# Patient Record
Sex: FEMALE | Race: WHITE | NOT HISPANIC OR LATINO | Employment: FULL TIME | URBAN - METROPOLITAN AREA
[De-identification: names, ages, dates, MRNs, and addresses within clinical notes are randomized per-mention and may not be internally consistent; named-entity substitution may affect disease eponyms.]

---

## 2017-09-21 ENCOUNTER — ALLSCRIPTS OFFICE VISIT (OUTPATIENT)
Dept: OTHER | Facility: OTHER | Age: 57
End: 2017-09-21

## 2017-09-21 DIAGNOSIS — E78.5 HYPERLIPIDEMIA: ICD-10-CM

## 2017-09-21 DIAGNOSIS — K21.9 GASTRO-ESOPHAGEAL REFLUX DISEASE WITHOUT ESOPHAGITIS: ICD-10-CM

## 2017-09-26 ENCOUNTER — APPOINTMENT (OUTPATIENT)
Dept: LAB | Facility: HOSPITAL | Age: 57
End: 2017-09-26
Payer: COMMERCIAL

## 2017-09-26 ENCOUNTER — TRANSCRIBE ORDERS (OUTPATIENT)
Dept: ADMINISTRATIVE | Facility: HOSPITAL | Age: 57
End: 2017-09-26

## 2017-09-26 ENCOUNTER — GENERIC CONVERSION - ENCOUNTER (OUTPATIENT)
Dept: OTHER | Facility: OTHER | Age: 57
End: 2017-09-26

## 2017-09-26 DIAGNOSIS — E78.5 HYPERLIPIDEMIA: ICD-10-CM

## 2017-09-26 DIAGNOSIS — K21.9 GASTRO-ESOPHAGEAL REFLUX DISEASE WITHOUT ESOPHAGITIS: ICD-10-CM

## 2017-09-26 LAB
ALBUMIN SERPL BCP-MCNC: 3.5 G/DL (ref 3.5–5)
ALP SERPL-CCNC: 44 U/L (ref 46–116)
ALT SERPL W P-5'-P-CCNC: 19 U/L (ref 12–78)
ANION GAP SERPL CALCULATED.3IONS-SCNC: 8 MMOL/L (ref 4–13)
AST SERPL W P-5'-P-CCNC: 14 U/L (ref 5–45)
BASOPHILS # BLD AUTO: 0.03 THOUSANDS/ΜL (ref 0–0.1)
BASOPHILS NFR BLD AUTO: 1 % (ref 0–1)
BILIRUB SERPL-MCNC: 0.3 MG/DL (ref 0.2–1)
BUN SERPL-MCNC: 15 MG/DL (ref 5–25)
CALCIUM SERPL-MCNC: 9.4 MG/DL (ref 8.3–10.1)
CHLORIDE SERPL-SCNC: 105 MMOL/L (ref 100–108)
CHOLEST SERPL-MCNC: 215 MG/DL (ref 50–200)
CO2 SERPL-SCNC: 30 MMOL/L (ref 21–32)
CREAT SERPL-MCNC: 0.59 MG/DL (ref 0.6–1.3)
EOSINOPHIL # BLD AUTO: 0.1 THOUSAND/ΜL (ref 0–0.61)
EOSINOPHIL NFR BLD AUTO: 2 % (ref 0–6)
ERYTHROCYTE [DISTWIDTH] IN BLOOD BY AUTOMATED COUNT: 12.3 % (ref 11.6–15.1)
GFR SERPL CREATININE-BSD FRML MDRD: 101 ML/MIN/1.73SQ M
GLUCOSE P FAST SERPL-MCNC: 96 MG/DL (ref 65–99)
HCT VFR BLD AUTO: 38.8 % (ref 34.8–46.1)
HDLC SERPL-MCNC: 55 MG/DL (ref 40–60)
HGB BLD-MCNC: 13.2 G/DL (ref 11.5–15.4)
LDLC SERPL CALC-MCNC: 153 MG/DL (ref 0–100)
LYMPHOCYTES # BLD AUTO: 2.42 THOUSANDS/ΜL (ref 0.6–4.47)
LYMPHOCYTES NFR BLD AUTO: 46 % (ref 14–44)
MCH RBC QN AUTO: 32.5 PG (ref 26.8–34.3)
MCHC RBC AUTO-ENTMCNC: 34 G/DL (ref 31.4–37.4)
MCV RBC AUTO: 96 FL (ref 82–98)
MONOCYTES # BLD AUTO: 0.38 THOUSAND/ΜL (ref 0.17–1.22)
MONOCYTES NFR BLD AUTO: 7 % (ref 4–12)
NEUTROPHILS # BLD AUTO: 2.33 THOUSANDS/ΜL (ref 1.85–7.62)
NEUTS SEG NFR BLD AUTO: 44 % (ref 43–75)
PLATELET # BLD AUTO: 286 THOUSANDS/UL (ref 149–390)
PMV BLD AUTO: 9.8 FL (ref 8.9–12.7)
POTASSIUM SERPL-SCNC: 4.8 MMOL/L (ref 3.5–5.3)
PROT SERPL-MCNC: 6.7 G/DL (ref 6.4–8.2)
RBC # BLD AUTO: 4.06 MILLION/UL (ref 3.81–5.12)
SODIUM SERPL-SCNC: 143 MMOL/L (ref 136–145)
TRIGL SERPL-MCNC: 36 MG/DL
TSH SERPL DL<=0.05 MIU/L-ACNC: 5.34 UIU/ML (ref 0.36–3.74)
WBC # BLD AUTO: 5.26 THOUSAND/UL (ref 4.31–10.16)

## 2017-09-26 PROCEDURE — 85025 COMPLETE CBC W/AUTO DIFF WBC: CPT

## 2017-09-26 PROCEDURE — 80061 LIPID PANEL: CPT

## 2017-09-26 PROCEDURE — 84443 ASSAY THYROID STIM HORMONE: CPT

## 2017-09-26 PROCEDURE — 36415 COLL VENOUS BLD VENIPUNCTURE: CPT

## 2017-09-26 PROCEDURE — 80053 COMPREHEN METABOLIC PANEL: CPT

## 2018-01-10 NOTE — RESULT NOTES
Verified Results  (1) CBC/PLT/DIFF [id number] 07:27AM Keira Felix Order Number: [id number]     Test Name Result Flag Reference   WBC COUNT 5 26 Thousand/uL  4 31-10 16   RBC COUNT 4 06 Million/uL  3 81-5 12   HEMOGLOBIN 13 2 g/dL  11 5-15 4   HEMATOCRIT 38 8 %  34 8-46  1   MCV 96 fL  82-98   MCH 32 5 pg  26 8-34 3   MCHC 34 0 g/dL  31 4-37 4   RDW 12 3 %  11 6-15 1   MPV 9 8 fL  8 9-12 7   PLATELET COUNT 407 Thousands/uL  149-390   NEUTROPHILS RELATIVE PERCENT 44 %  43-75   LYMPHOCYTES RELATIVE PERCENT 46 % H 14-44   MONOCYTES RELATIVE PERCENT 7 %  4-12   EOSINOPHILS RELATIVE PERCENT 2 %  0-6   BASOPHILS RELATIVE PERCENT 1 %  0-1   NEUTROPHILS ABSOLUTE COUNT 2 33 Thousands/? ??L  1 85-7 62   LYMPHOCYTES ABSOLUTE COUNT 2 42 Thousands/? ??L  0 60-4 47   MONOCYTES ABSOLUTE COUNT 0 38 Thousand/? ??L  0 17-1 22   EOSINOPHILS ABSOLUTE COUNT 0 10 Thousand/? ??L  0 00-0 61   BASOPHILS ABSOLUTE COUNT 0 03 Thousands/? ??L  0 00-0 10     (1) COMPREHENSIVE METABOLIC PANEL [id number] 10:72VB Keira Felix Order Number: [id number]     Test Name Result Flag Reference   SODIUM 143 mmol/L  136-145   POTASSIUM 4 8 mmol/L  3 5-5 3   CHLORIDE 105 mmol/L  100-108   CARBON DIOXIDE 30 mmol/L  21-32   ANION GAP (CALC) 8 mmol/L  4-13   BLOOD UREA NITROGEN 15 mg/dL  5-25   CREATININE 0 59 mg/dL L 0 60-1 30   Standardized to IDMS reference method   CALCIUM 9 4 mg/dL  8 3-10 1   BILI, TOTAL 0 30 mg/dL  0 20-1 00   ALK PHOSPHATAS 44 U/L L    ALT (SGPT) 19 U/L  12-78   Specimen collection should occur prior to Sulfasalazine administration due to the potential for falsely depressed results  AST(SGOT) 14 U/L  5-45   Specimen collection should occur prior to Sulfasalazine administration due to the potential for falsely depressed results     ALBUMIN 3 5 g/dL  3 5-5 0   TOTAL PROTEIN 6 7 g/dL  6 4-8 2   eGFR 101 ml/min/1 73sq m     [employer] Disease Education Program recommendations are as follows:  GFR calculation is accurate only with a steady state creatinine  Chronic Kidney disease less than 60 ml/min/1 73 sq  meters  Kidney failure less than 15 ml/min/1 73 sq  meters  GLUCOSE FASTING 96 mg/dL  65-99   Specimen collection should occur prior to Sulfasalazine administration due to the potential for falsely depressed results  Specimen collection should occur prior to Sulfapyridine administration due to the potential for falsely elevated results  (1) LIPID PANEL, FASTING [id number] 07:27AM [employer] Order Number: [id number]     Test Name Result Flag Reference   CHOLESTEROL 215 mg/dL H    HDL,DIRECT 55 mg/dL  40-60   Specimen collection should occur prior to Metamizole administration due to the potential for falsley depressed results  LDL CHOLESTEROL CALCULATED 153 mg/dL H 0-100   Triglyceride:        Normal <150 mg/dl   Borderline High 150-199 mg/dl   High 200-499 mg/dl   Very High >499 mg/dl      Cholesterol:       Desirable <200 mg/dl    Borderline High 200-239 mg/dl    High >239 mg/dl      HDL Cholesterol:       High>59 mg/dL    Low <41 mg/dL      This screening LDL is a calculated result  It does not have the accuracy of the Direct Measured LDL in the monitoring of patients with hyperlipidemia and/or statin therapy  Direct Measure LDL (MXU528) must be ordered separately in these patients  TRIGLYCERIDES 36 mg/dL  <=150   Specimen collection should occur prior to N-Acetylcysteine or Metamizole administration due to the potential for falsely depressed results  (1) TSH [id number] 07:27AM [employer] [facility] [employer] Order Number: [id number]     Test Name Result Flag Reference   TSH 5 336 uIU/mL H 0 358-3 740   Patients undergoing fluorescein dye angiography may retain small amounts of fluorescein in the body for 48-72 hours post procedure  Samples containing fluorescein can produce falsely depressed TSH values   If the patient had this procedure,a specimen should be resubmitted post fluorescein clearance            The recommended reference ranges for TSH during pregnancy are as follows:  First trimester 0 1 to 2 5 uIU/mL  Second trimester  0 2 to 3 0 uIU/mL  Third trimester 0 3 to 3 0 uIU/m

## 2018-01-13 VITALS
WEIGHT: 133 LBS | BODY MASS INDEX: 20.16 KG/M2 | HEIGHT: 68 IN | SYSTOLIC BLOOD PRESSURE: 110 MMHG | DIASTOLIC BLOOD PRESSURE: 70 MMHG

## 2018-03-27 ENCOUNTER — OFFICE VISIT (OUTPATIENT)
Dept: FAMILY MEDICINE CLINIC | Facility: CLINIC | Age: 58
End: 2018-03-27
Payer: COMMERCIAL

## 2018-03-27 VITALS
BODY MASS INDEX: 21.19 KG/M2 | SYSTOLIC BLOOD PRESSURE: 124 MMHG | HEIGHT: 68 IN | WEIGHT: 139.8 LBS | DIASTOLIC BLOOD PRESSURE: 76 MMHG

## 2018-03-27 DIAGNOSIS — R73.9 HYPERGLYCEMIA: ICD-10-CM

## 2018-03-27 DIAGNOSIS — Z23 NEED FOR PERTUSSIS VACCINATION: ICD-10-CM

## 2018-03-27 DIAGNOSIS — E78.49 OTHER HYPERLIPIDEMIA: ICD-10-CM

## 2018-03-27 DIAGNOSIS — E03.8 SUBCLINICAL HYPOTHYROIDISM: ICD-10-CM

## 2018-03-27 DIAGNOSIS — Z12.31 ENCOUNTER FOR SCREENING MAMMOGRAM FOR BREAST CANCER: ICD-10-CM

## 2018-03-27 DIAGNOSIS — M67.442 GANGLION CYST OF FINGER OF LEFT HAND: Primary | ICD-10-CM

## 2018-03-27 DIAGNOSIS — K21.9 GASTROESOPHAGEAL REFLUX DISEASE WITHOUT ESOPHAGITIS: ICD-10-CM

## 2018-03-27 PROCEDURE — 90715 TDAP VACCINE 7 YRS/> IM: CPT | Performed by: FAMILY MEDICINE

## 2018-03-27 PROCEDURE — 4004F PT TOBACCO SCREEN RCVD TLK: CPT | Performed by: FAMILY MEDICINE

## 2018-03-27 PROCEDURE — 99214 OFFICE O/P EST MOD 30 MIN: CPT | Performed by: FAMILY MEDICINE

## 2018-03-27 PROCEDURE — 90471 IMMUNIZATION ADMIN: CPT | Performed by: FAMILY MEDICINE

## 2018-03-27 PROCEDURE — 3008F BODY MASS INDEX DOCD: CPT | Performed by: FAMILY MEDICINE

## 2018-03-27 RX ORDER — SIMVASTATIN 40 MG
1 TABLET ORAL DAILY
COMMUNITY
Start: 2014-12-01 | End: 2018-09-22 | Stop reason: SDUPTHER

## 2018-03-27 RX ORDER — PANTOPRAZOLE SODIUM 40 MG/1
1 TABLET, DELAYED RELEASE ORAL DAILY
COMMUNITY
Start: 2014-07-03 | End: 2018-10-06 | Stop reason: SDUPTHER

## 2018-03-27 RX ORDER — MULTIVITAMIN/IRON/FOLIC ACID 18MG-0.4MG
TABLET ORAL
COMMUNITY

## 2018-03-27 NOTE — PROGRESS NOTES
Assessment/Plan:   we will refer her to Orthopedics for her ganglion cyst   Blood work ordered  Mammogram ordered  Adacel given today  Follow-up in 4-6 months or p r n  No problem-specific Assessment & Plan notes found for this encounter  Diagnoses and all orders for this visit:    Ganglion cyst of finger of left hand  -     Ambulatory referral to Orthopedic Surgery; Future    Encounter for screening mammogram for breast cancer  -     Mammo screening bilateral w 3d & cad; Future    Need for pertussis vaccination  -     TDAP VACCINE GREATER THAN OR EQUAL TO 8YO IM    Gastroesophageal reflux disease without esophagitis  -     CBC and differential    Hyperglycemia  -     HEMOGLOBIN A1C W/ EAG ESTIMATION    Other hyperlipidemia  -     Comprehensive metabolic panel  -     Lipid Panel with Direct LDL reflex    Subclinical hypothyroidism  -     TSH, 3rd generation with T4 reflex  -     T4, free  -     T3    Other orders  -     DAILY MULTIPLE VITAMINS/IRON TABS; Take by mouth  -     pantoprazole (PROTONIX) 40 mg tablet; Take 1 tablet by mouth daily  -     simvastatin (ZOCOR) 40 mg tablet; Take 1 tablet by mouth Daily          Subjective:      Patient ID: Geovani Green is a 60 y o  female  F/U  Patient denies any chest pain or shortness of breath  Patient has a ganglion cyst on her left finger  This has been recurrent for her  It is hard for her to bend her finger and has become tender  Patient tried to stop her Protonix, but her reflux return  She is back on it, and doing well  Patient does  still feel fatigued  Her last TSH was 5  Thyroid Problem   Presents for follow-up visit  Symptoms include fatigue  The symptoms have been stable  Heartburn   She complains of heartburn  This is a chronic problem  The problem occurs rarely  The problem has been resolved  Associated symptoms include fatigue  Risk factors include smoking/tobacco exposure  She has tried a PPI for the symptoms   The treatment provided significant relief  The following portions of the patient's history were reviewed and updated as appropriate:   She  has no past medical history on file  She   Patient Active Problem List    Diagnosis Date Noted    Subclinical hypothyroidism 03/27/2018    Vitreous floaters of both eyes 07/03/2014    Gastroesophageal reflux disease without esophagitis 10/29/2012    Hyperglycemia 10/29/2012    Other hyperlipidemia 10/29/2012    Sciatica 10/29/2012     She  has a past surgical history that includes Bladder surgery; 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); Esophagogastroduodenoscopy (2013); and Hysterectomy  Her family history is not on file  She  reports that she has been smoking  She has never used smokeless tobacco  She reports that she drinks alcohol  She reports that she does not use drugs  Current Outpatient Prescriptions   Medication Sig Dispense Refill    DAILY MULTIPLE VITAMINS/IRON TABS Take by mouth      pantoprazole (PROTONIX) 40 mg tablet Take 1 tablet by mouth daily      simvastatin (ZOCOR) 40 mg tablet Take 1 tablet by mouth Daily       No current facility-administered medications for this visit  No current outpatient prescriptions on file prior to visit  No current facility-administered medications on file prior to visit  She has No Known Allergies       Review of Systems   Constitutional: Positive for fatigue  Respiratory: Negative  Cardiovascular: Negative  Gastrointestinal: Positive for heartburn  As per HPI   Genitourinary: Negative  Musculoskeletal:        As per HPI         Objective:      /76   Ht 5' 8" (1 727 m)   Wt 63 4 kg (139 lb 12 8 oz)   BMI 21 26 kg/m²          Physical Exam   Constitutional: She is oriented to person, place, and time  She appears well-developed and well-nourished  No distress  Cardiovascular: Normal rate, regular rhythm and normal heart sounds      No murmur heard   Pulmonary/Chest: Effort normal and breath sounds normal  No respiratory distress  She has no wheezes  She has no rales  Musculoskeletal: She exhibits no edema  Positive ganglion cyst, 1 cm in size on the left ring finger  Neurological: She is alert and oriented to person, place, and time  Psychiatric: She has a normal mood and affect   Her behavior is normal

## 2018-06-05 ENCOUNTER — APPOINTMENT (OUTPATIENT)
Dept: LAB | Facility: HOSPITAL | Age: 58
End: 2018-06-05
Payer: COMMERCIAL

## 2018-06-05 ENCOUNTER — HOSPITAL ENCOUNTER (OUTPATIENT)
Dept: MAMMOGRAPHY | Facility: HOSPITAL | Age: 58
Discharge: HOME | End: 2018-06-05
Payer: COMMERCIAL

## 2018-06-05 DIAGNOSIS — Z12.31 ENCOUNTER FOR SCREENING MAMMOGRAM FOR BREAST CANCER: ICD-10-CM

## 2018-06-05 LAB
ALBUMIN SERPL BCP-MCNC: 4 G/DL (ref 3.5–5)
ALP SERPL-CCNC: 43 U/L (ref 46–116)
ALT SERPL W P-5'-P-CCNC: 29 U/L (ref 12–78)
ANION GAP SERPL CALCULATED.3IONS-SCNC: 9 MMOL/L (ref 4–13)
AST SERPL W P-5'-P-CCNC: 19 U/L (ref 5–45)
BASOPHILS # BLD AUTO: 0.04 THOUSANDS/ΜL (ref 0–0.1)
BASOPHILS NFR BLD AUTO: 1 % (ref 0–1)
BILIRUB SERPL-MCNC: 0.4 MG/DL (ref 0.2–1)
BUN SERPL-MCNC: 14 MG/DL (ref 5–25)
CALCIUM SERPL-MCNC: 9.1 MG/DL (ref 8.3–10.1)
CHLORIDE SERPL-SCNC: 104 MMOL/L (ref 100–108)
CHOLEST SERPL-MCNC: 199 MG/DL (ref 50–200)
CO2 SERPL-SCNC: 29 MMOL/L (ref 21–32)
CREAT SERPL-MCNC: 0.64 MG/DL (ref 0.6–1.3)
EOSINOPHIL # BLD AUTO: 0.15 THOUSAND/ΜL (ref 0–0.61)
EOSINOPHIL NFR BLD AUTO: 3 % (ref 0–6)
ERYTHROCYTE [DISTWIDTH] IN BLOOD BY AUTOMATED COUNT: 12 % (ref 11.6–15.1)
EST. AVERAGE GLUCOSE BLD GHB EST-MCNC: 128 MG/DL
GFR SERPL CREATININE-BSD FRML MDRD: 98 ML/MIN/1.73SQ M
GLUCOSE P FAST SERPL-MCNC: 97 MG/DL (ref 65–99)
HBA1C MFR BLD: 6.1 % (ref 4.2–6.3)
HCT VFR BLD AUTO: 40.2 % (ref 34.8–46.1)
HDLC SERPL-MCNC: 65 MG/DL (ref 40–60)
HGB BLD-MCNC: 14 G/DL (ref 11.5–15.4)
LDLC SERPL CALC-MCNC: 126 MG/DL (ref 0–100)
LYMPHOCYTES # BLD AUTO: 2.89 THOUSANDS/ΜL (ref 0.6–4.47)
LYMPHOCYTES NFR BLD AUTO: 51 % (ref 14–44)
MCH RBC QN AUTO: 33 PG (ref 26.8–34.3)
MCHC RBC AUTO-ENTMCNC: 34.8 G/DL (ref 31.4–37.4)
MCV RBC AUTO: 95 FL (ref 82–98)
MONOCYTES # BLD AUTO: 0.43 THOUSAND/ΜL (ref 0.17–1.22)
MONOCYTES NFR BLD AUTO: 8 % (ref 4–12)
NEUTROPHILS # BLD AUTO: 2.2 THOUSANDS/ΜL (ref 1.85–7.62)
NEUTS SEG NFR BLD AUTO: 39 % (ref 43–75)
PLATELET # BLD AUTO: 266 THOUSANDS/UL (ref 149–390)
PMV BLD AUTO: 9.9 FL (ref 8.9–12.7)
POTASSIUM SERPL-SCNC: 4.7 MMOL/L (ref 3.5–5.3)
PROT SERPL-MCNC: 7.2 G/DL (ref 6.4–8.2)
RBC # BLD AUTO: 4.24 MILLION/UL (ref 3.81–5.12)
SODIUM SERPL-SCNC: 142 MMOL/L (ref 136–145)
T3 SERPL-MCNC: 0.9 NG/ML (ref 0.6–1.8)
T4 FREE SERPL-MCNC: 1 NG/DL (ref 0.76–1.46)
T4 FREE SERPL-MCNC: 1 NG/DL (ref 0.76–1.46)
TRIGL SERPL-MCNC: 39 MG/DL
TSH SERPL DL<=0.05 MIU/L-ACNC: 5.01 UIU/ML (ref 0.36–3.74)
WBC # BLD AUTO: 5.71 THOUSAND/UL (ref 4.31–10.16)

## 2018-06-05 PROCEDURE — 85025 COMPLETE CBC W/AUTO DIFF WBC: CPT | Performed by: FAMILY MEDICINE

## 2018-06-05 PROCEDURE — 84443 ASSAY THYROID STIM HORMONE: CPT | Performed by: FAMILY MEDICINE

## 2018-06-05 PROCEDURE — 77063 BREAST TOMOSYNTHESIS BI: CPT

## 2018-06-05 PROCEDURE — 36415 COLL VENOUS BLD VENIPUNCTURE: CPT | Performed by: FAMILY MEDICINE

## 2018-06-05 PROCEDURE — 80061 LIPID PANEL: CPT | Performed by: FAMILY MEDICINE

## 2018-06-05 PROCEDURE — 84439 ASSAY OF FREE THYROXINE: CPT | Performed by: FAMILY MEDICINE

## 2018-06-05 PROCEDURE — 83036 HEMOGLOBIN GLYCOSYLATED A1C: CPT | Performed by: FAMILY MEDICINE

## 2018-06-05 PROCEDURE — 77067 SCR MAMMO BI INCL CAD: CPT

## 2018-06-05 PROCEDURE — 80053 COMPREHEN METABOLIC PANEL: CPT | Performed by: FAMILY MEDICINE

## 2018-06-05 PROCEDURE — 84480 ASSAY TRIIODOTHYRONINE (T3): CPT | Performed by: FAMILY MEDICINE

## 2018-09-22 DIAGNOSIS — E78.49 OTHER HYPERLIPIDEMIA: Primary | ICD-10-CM

## 2018-09-24 RX ORDER — SIMVASTATIN 40 MG
TABLET ORAL
Qty: 90 TABLET | Refills: 3 | Status: SHIPPED | OUTPATIENT
Start: 2018-09-24 | End: 2019-01-22 | Stop reason: SDUPTHER

## 2018-10-06 DIAGNOSIS — K21.9 GASTROESOPHAGEAL REFLUX DISEASE WITHOUT ESOPHAGITIS: Primary | ICD-10-CM

## 2018-10-07 RX ORDER — PANTOPRAZOLE SODIUM 40 MG/1
TABLET, DELAYED RELEASE ORAL
Qty: 90 TABLET | Refills: 3 | Status: SHIPPED | OUTPATIENT
Start: 2018-10-07 | End: 2019-01-22 | Stop reason: SDUPTHER

## 2019-01-22 ENCOUNTER — APPOINTMENT (OUTPATIENT)
Dept: RADIOLOGY | Facility: MEDICAL CENTER | Age: 59
End: 2019-01-22
Payer: COMMERCIAL

## 2019-01-22 ENCOUNTER — OFFICE VISIT (OUTPATIENT)
Dept: FAMILY MEDICINE CLINIC | Facility: CLINIC | Age: 59
End: 2019-01-22
Payer: COMMERCIAL

## 2019-01-22 ENCOUNTER — APPOINTMENT (OUTPATIENT)
Dept: LAB | Facility: MEDICAL CENTER | Age: 59
End: 2019-01-22
Payer: COMMERCIAL

## 2019-01-22 VITALS
SYSTOLIC BLOOD PRESSURE: 124 MMHG | WEIGHT: 144 LBS | BODY MASS INDEX: 21.82 KG/M2 | HEIGHT: 68 IN | DIASTOLIC BLOOD PRESSURE: 78 MMHG

## 2019-01-22 DIAGNOSIS — G89.29 CHRONIC BILATERAL LOW BACK PAIN WITHOUT SCIATICA: Primary | ICD-10-CM

## 2019-01-22 DIAGNOSIS — E78.49 OTHER HYPERLIPIDEMIA: ICD-10-CM

## 2019-01-22 DIAGNOSIS — N95.1 POSTMENOPAUSAL SYNDROME: ICD-10-CM

## 2019-01-22 DIAGNOSIS — M54.50 CHRONIC BILATERAL LOW BACK PAIN WITHOUT SCIATICA: Primary | ICD-10-CM

## 2019-01-22 DIAGNOSIS — M54.50 CHRONIC BILATERAL LOW BACK PAIN WITHOUT SCIATICA: ICD-10-CM

## 2019-01-22 DIAGNOSIS — E03.8 SUBCLINICAL HYPOTHYROIDISM: ICD-10-CM

## 2019-01-22 DIAGNOSIS — K21.9 GASTROESOPHAGEAL REFLUX DISEASE WITHOUT ESOPHAGITIS: ICD-10-CM

## 2019-01-22 DIAGNOSIS — R73.9 HYPERGLYCEMIA: ICD-10-CM

## 2019-01-22 DIAGNOSIS — G89.29 CHRONIC BILATERAL LOW BACK PAIN WITHOUT SCIATICA: ICD-10-CM

## 2019-01-22 LAB
EST. AVERAGE GLUCOSE BLD GHB EST-MCNC: 134 MG/DL
HBA1C MFR BLD: 6.3 % (ref 4.2–6.3)
LDLC SERPL DIRECT ASSAY-MCNC: 110 MG/DL (ref 0–100)
T4 FREE SERPL-MCNC: 1.01 NG/DL (ref 0.76–1.46)
TSH SERPL DL<=0.05 MIU/L-ACNC: 4.89 UIU/ML (ref 0.36–3.74)

## 2019-01-22 PROCEDURE — 4004F PT TOBACCO SCREEN RCVD TLK: CPT | Performed by: FAMILY MEDICINE

## 2019-01-22 PROCEDURE — 83036 HEMOGLOBIN GLYCOSYLATED A1C: CPT | Performed by: FAMILY MEDICINE

## 2019-01-22 PROCEDURE — 72110 X-RAY EXAM L-2 SPINE 4/>VWS: CPT

## 2019-01-22 PROCEDURE — 36415 COLL VENOUS BLD VENIPUNCTURE: CPT | Performed by: FAMILY MEDICINE

## 2019-01-22 PROCEDURE — 83721 ASSAY OF BLOOD LIPOPROTEIN: CPT | Performed by: FAMILY MEDICINE

## 2019-01-22 PROCEDURE — 99214 OFFICE O/P EST MOD 30 MIN: CPT | Performed by: FAMILY MEDICINE

## 2019-01-22 PROCEDURE — 3008F BODY MASS INDEX DOCD: CPT | Performed by: FAMILY MEDICINE

## 2019-01-22 PROCEDURE — 84439 ASSAY OF FREE THYROXINE: CPT | Performed by: FAMILY MEDICINE

## 2019-01-22 PROCEDURE — 84443 ASSAY THYROID STIM HORMONE: CPT | Performed by: FAMILY MEDICINE

## 2019-01-22 PROCEDURE — 3044F HG A1C LEVEL LT 7.0%: CPT | Performed by: FAMILY MEDICINE

## 2019-01-22 RX ORDER — SIMVASTATIN 40 MG
40 TABLET ORAL EVERY EVENING
Qty: 90 TABLET | Refills: 3 | Status: SHIPPED | OUTPATIENT
Start: 2019-01-22 | End: 2020-01-21 | Stop reason: SDUPTHER

## 2019-01-22 RX ORDER — PANTOPRAZOLE SODIUM 40 MG/1
40 TABLET, DELAYED RELEASE ORAL DAILY
Qty: 90 TABLET | Refills: 3 | Status: SHIPPED | OUTPATIENT
Start: 2019-01-22 | End: 2020-01-21 | Stop reason: SDUPTHER

## 2019-01-22 NOTE — PROGRESS NOTES
Assessment/Plan:  Since patient is low back pain has been for such a long time, over 17 years, we will get a low back x-ray  We will refer to Pain Management  We will get a DEXA scan on her  Blood work also ordered  We will see her back in 4-6 months or p r n     We will call her with results of the studies  No problem-specific Assessment & Plan notes found for this encounter  Diagnoses and all orders for this visit:    Chronic bilateral low back pain without sciatica  -     XR spine lumbar minimum 4 views non injury; Future  -     Ambulatory referral to Pain Management; Future    Subclinical hypothyroidism  -     TSH, 3rd generation with Free T4 reflex    Other hyperlipidemia  -     LDL cholesterol, direct  -     simvastatin (ZOCOR) 40 mg tablet; Take 1 tablet (40 mg total) by mouth every evening    Hyperglycemia  -     Hemoglobin A1C    Postmenopausal syndrome  -     DXA bone density spine hip and pelvis; Future    Gastroesophageal reflux disease without esophagitis  -     pantoprazole (PROTONIX) 40 mg tablet; Take 1 tablet (40 mg total) by mouth daily          Subjective:      Patient ID: Danny Schumacher is a 60 y o  female  Patient here today stating that her low back pain is been getting worse  It seems to act up whenever she is crouching down her bending forward  Patient has been dealing with this back pain for or 17 years  It is not radiating down the legs  No bowel or bladder problems  Patient has been seeing chiropractic care with no relief  Patient has GERD is controlled on Protonix  Patient has tried to go off it, but the reflux returns  Back Pain   This is a chronic problem  The current episode started more than 1 year ago  The problem occurs constantly  The problem has been gradually worsening since onset  The pain is present in the lumbar spine  The quality of the pain is described as aching  The pain does not radiate  The pain is moderate  The pain is the same all the time  The symptoms are aggravated by bending, twisting and position  Stiffness is present all day  Pertinent negatives include no bladder incontinence, bowel incontinence, leg pain, paresthesias or weakness  She has tried analgesics for the symptoms  The treatment provided mild relief  Hyperlipidemia   This is a chronic problem  The problem is resistant  Recent lipid tests were reviewed and are variable  There are no known factors aggravating her hyperlipidemia  Pertinent negatives include no leg pain  Current antihyperlipidemic treatment includes statins  The current treatment provides moderate improvement of lipids  There are no compliance problems  Heartburn   She complains of heartburn  This is a chronic problem  The problem occurs occasionally  The heartburn is located in the substernum  The symptoms are aggravated by certain foods  She has tried a PPI for the symptoms  The treatment provided significant relief  The following portions of the patient's history were reviewed and updated as appropriate:   She  has no past medical history on file  She   Patient Active Problem List    Diagnosis Date Noted    Chronic bilateral low back pain without sciatica 01/22/2019    Subclinical hypothyroidism 03/27/2018    Vitreous floaters of both eyes 07/03/2014    Gastroesophageal reflux disease without esophagitis 10/29/2012    Hyperglycemia 10/29/2012    Other hyperlipidemia 10/29/2012    Sciatica 10/29/2012     She  has a past surgical history that includes Bladder surgery; 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); Esophagogastroduodenoscopy (2013); and Hysterectomy  Her family history includes No Known Problems in her mother  She  reports that she has been smoking  She has never used smokeless tobacco  She reports that she drinks alcohol  She reports that she does not use drugs    Current Outpatient Prescriptions   Medication Sig Dispense Refill    DAILY MULTIPLE VITAMINS/IRON TABS Take by mouth      pantoprazole (PROTONIX) 40 mg tablet Take 1 tablet (40 mg total) by mouth daily 90 tablet 3    simvastatin (ZOCOR) 40 mg tablet Take 1 tablet (40 mg total) by mouth every evening 90 tablet 3     No current facility-administered medications for this visit  Current Outpatient Prescriptions on File Prior to Visit   Medication Sig    DAILY MULTIPLE VITAMINS/IRON TABS Take by mouth    [DISCONTINUED] pantoprazole (PROTONIX) 40 mg tablet take 1 tablet by mouth once daily    [DISCONTINUED] simvastatin (ZOCOR) 40 mg tablet take 1 tablet by mouth every evening     No current facility-administered medications on file prior to visit  She has No Known Allergies       Review of Systems   Constitutional: Negative  Respiratory: Negative  Cardiovascular: Negative  Gastrointestinal: Positive for heartburn  Negative for bowel incontinence  As per HPI   Genitourinary: Negative  Negative for bladder incontinence  Musculoskeletal: Positive for back pain  Neurological: Negative for weakness and paresthesias  Objective:      /78   Ht 5' 8" (1 727 m)   Wt 65 3 kg (144 lb)   BMI 21 90 kg/m²          Physical Exam   Constitutional: She is oriented to person, place, and time  She appears well-developed and well-nourished  No distress  HENT:   Head: Normocephalic and atraumatic  Eyes: Conjunctivae are normal    Cardiovascular: Normal rate, regular rhythm and normal heart sounds  Exam reveals no gallop and no friction rub  No murmur heard  Pulmonary/Chest: Effort normal and breath sounds normal  No respiratory distress  She has no wheezes  She has no rales  Musculoskeletal: She exhibits tenderness (Tenderness in the low lumbar spinal musculature and PSIS bilaterally)  She exhibits no edema  Neurological: She is alert and oriented to person, place, and time  Skin: She is not diaphoretic  Psychiatric: She has a normal mood and affect   Her behavior is normal  Judgment and thought content normal    Vitals reviewed

## 2020-01-21 ENCOUNTER — OFFICE VISIT (OUTPATIENT)
Dept: FAMILY MEDICINE CLINIC | Facility: CLINIC | Age: 60
End: 2020-01-21
Payer: COMMERCIAL

## 2020-01-21 VITALS
SYSTOLIC BLOOD PRESSURE: 152 MMHG | WEIGHT: 155.6 LBS | HEIGHT: 68 IN | DIASTOLIC BLOOD PRESSURE: 76 MMHG | BODY MASS INDEX: 23.58 KG/M2

## 2020-01-21 DIAGNOSIS — E78.49 OTHER HYPERLIPIDEMIA: ICD-10-CM

## 2020-01-21 DIAGNOSIS — K21.9 GASTROESOPHAGEAL REFLUX DISEASE WITHOUT ESOPHAGITIS: ICD-10-CM

## 2020-01-21 DIAGNOSIS — R73.9 HYPERGLYCEMIA: ICD-10-CM

## 2020-01-21 DIAGNOSIS — E03.8 SUBCLINICAL HYPOTHYROIDISM: ICD-10-CM

## 2020-01-21 DIAGNOSIS — F41.9 ANXIETY: Primary | ICD-10-CM

## 2020-01-21 DIAGNOSIS — Z12.39 SCREENING FOR BREAST CANCER: ICD-10-CM

## 2020-01-21 PROCEDURE — 3008F BODY MASS INDEX DOCD: CPT | Performed by: FAMILY MEDICINE

## 2020-01-21 PROCEDURE — 99214 OFFICE O/P EST MOD 30 MIN: CPT | Performed by: FAMILY MEDICINE

## 2020-01-21 RX ORDER — SIMVASTATIN 40 MG
40 TABLET ORAL EVERY EVENING
Qty: 90 TABLET | Refills: 3 | Status: SHIPPED | OUTPATIENT
Start: 2020-01-21 | End: 2021-04-06

## 2020-01-21 RX ORDER — PANTOPRAZOLE SODIUM 40 MG/1
40 TABLET, DELAYED RELEASE ORAL DAILY
Qty: 90 TABLET | Refills: 3 | Status: SHIPPED | OUTPATIENT
Start: 2020-01-21 | End: 2021-04-06

## 2020-01-21 NOTE — PROGRESS NOTES
Assessment/Plan: For her anxiety, we will start her on sertraline, 25 mg daily for 4 days, then 50 mg daily  She will call us with any problems  Blood work ordered  We will see her back in 4 weeks or p r n  Problem List Items Addressed This Visit        Digestive    Gastroesophageal reflux disease without esophagitis       Endocrine    Subclinical hypothyroidism    Relevant Orders    CBC and differential    TSH, 3rd generation with Free T4 reflex       Other    Hyperglycemia    Relevant Orders    CBC and differential    Hemoglobin A1C    Other hyperlipidemia    Relevant Orders    CBC and differential    Comprehensive metabolic panel    Lipid Panel with Direct LDL reflex    Anxiety - Primary    Relevant Medications    sertraline (ZOLOFT) 50 mg tablet      Other Visit Diagnoses     Screening for breast cancer        Relevant Orders    Mammo screening bilateral w 3d & cad           Diagnoses and all orders for this visit:    Anxiety  -     sertraline (ZOLOFT) 50 mg tablet; Take 1 tablet (50 mg total) by mouth daily    Screening for breast cancer  -     Mammo screening bilateral w 3d & cad; Future    Other hyperlipidemia  -     CBC and differential  -     Comprehensive metabolic panel  -     Lipid Panel with Direct LDL reflex    Hyperglycemia  -     CBC and differential  -     Hemoglobin A1C    Subclinical hypothyroidism  -     CBC and differential  -     TSH, 3rd generation with Free T4 reflex    Gastroesophageal reflux disease without esophagitis        No problem-specific Assessment & Plan notes found for this encounter  Subjective:      Patient ID: Bess Hernandez is a 63 y o  female  Patient here today stating that she has been having trouble falling asleep and staying asleep  Feels anxious  No SI or HI  Number tried anything for anxiety in the past   Otherwise patient denies any chest pain or shortness of breath  She is due for blood work  Anxiety   Presents for initial visit   Onset was 1 to 6 months ago  The problem has been gradually worsening  Symptoms include insomnia and nervous/anxious behavior  Patient reports no suicidal ideas  Symptoms occur constantly  The severity of symptoms is moderate  The symptoms are aggravated by work stress  The quality of sleep is poor  Nighttime awakenings: several      There are no known risk factors  Treatments tried: Melatonin  The treatment provided no relief  Hyperlipidemia   This is a chronic problem  The problem is controlled  Recent lipid tests were reviewed and are normal  There are no known factors aggravating her hyperlipidemia  Current antihyperlipidemic treatment includes statins  The current treatment provides significant improvement of lipids  There are no compliance problems  Risk factors for coronary artery disease include post-menopausal        The following portions of the patient's history were reviewed and updated as appropriate:   She has no past medical history on file  ,  does not have any pertinent problems on file  ,   has a past surgical history that includes Bladder surgery; 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); Esophagogastroduodenoscopy (2013); and Hysterectomy  ,  family history includes No Known Problems in her mother  ,   reports that she has quit smoking  She has never used smokeless tobacco  She reports that she drinks alcohol  She reports that she does not use drugs  ,  has No Known Allergies     Current Outpatient Medications   Medication Sig Dispense Refill    DAILY MULTIPLE VITAMINS/IRON TABS Take by mouth      pantoprazole (PROTONIX) 40 mg tablet Take 1 tablet (40 mg total) by mouth daily 90 tablet 3    sertraline (ZOLOFT) 50 mg tablet Take 1 tablet (50 mg total) by mouth daily 30 tablet 5    simvastatin (ZOCOR) 40 mg tablet Take 1 tablet (40 mg total) by mouth every evening 90 tablet 3     No current facility-administered medications for this visit  Review of Systems   Respiratory: Negative  Cardiovascular: Negative  Gastrointestinal: Negative  Genitourinary: Negative  Psychiatric/Behavioral: Negative for suicidal ideas  The patient is nervous/anxious and has insomnia  Objective:  Vitals:    01/21/20 0843   BP: 152/76   Weight: 70 6 kg (155 lb 9 6 oz)   Height: 5' 8" (1 727 m)     Body mass index is 23 66 kg/m²  Physical Exam   Constitutional: She is oriented to person, place, and time  She appears well-developed and well-nourished  No distress  HENT:   Head: Normocephalic and atraumatic  Eyes: Conjunctivae are normal    Cardiovascular: Normal rate, regular rhythm and normal heart sounds  Exam reveals no gallop and no friction rub  No murmur heard  Pulmonary/Chest: Effort normal and breath sounds normal  No respiratory distress  She has no wheezes  She has no rales  Musculoskeletal: She exhibits no edema  Neurological: She is alert and oriented to person, place, and time  Skin: She is not diaphoretic  Psychiatric: She has a normal mood and affect  Her behavior is normal  Judgment and thought content normal    Vitals reviewed

## 2020-02-11 ENCOUNTER — HOSPITAL ENCOUNTER (OUTPATIENT)
Dept: MAMMOGRAPHY | Facility: HOSPITAL | Age: 60
Discharge: HOME | End: 2020-02-11
Payer: COMMERCIAL

## 2020-02-11 ENCOUNTER — APPOINTMENT (OUTPATIENT)
Dept: LAB | Facility: HOSPITAL | Age: 60
End: 2020-02-11
Payer: COMMERCIAL

## 2020-02-11 VITALS — HEIGHT: 68 IN | BODY MASS INDEX: 23.49 KG/M2 | WEIGHT: 155 LBS

## 2020-02-11 DIAGNOSIS — Z12.39 SCREENING FOR BREAST CANCER: ICD-10-CM

## 2020-02-11 LAB
ALBUMIN SERPL BCP-MCNC: 3.8 G/DL (ref 3.5–5)
ALP SERPL-CCNC: 36 U/L (ref 46–116)
ALT SERPL W P-5'-P-CCNC: 25 U/L (ref 12–78)
ANION GAP SERPL CALCULATED.3IONS-SCNC: 5 MMOL/L (ref 4–13)
AST SERPL W P-5'-P-CCNC: 18 U/L (ref 5–45)
BASOPHILS # BLD AUTO: 0.04 THOUSANDS/ΜL (ref 0–0.1)
BASOPHILS NFR BLD AUTO: 1 % (ref 0–1)
BILIRUB SERPL-MCNC: 0.5 MG/DL (ref 0.2–1)
BUN SERPL-MCNC: 16 MG/DL (ref 5–25)
CALCIUM SERPL-MCNC: 8.7 MG/DL (ref 8.3–10.1)
CHLORIDE SERPL-SCNC: 102 MMOL/L (ref 100–108)
CHOLEST SERPL-MCNC: 169 MG/DL (ref 50–200)
CO2 SERPL-SCNC: 30 MMOL/L (ref 21–32)
CREAT SERPL-MCNC: 0.55 MG/DL (ref 0.6–1.3)
EOSINOPHIL # BLD AUTO: 0.08 THOUSAND/ΜL (ref 0–0.61)
EOSINOPHIL NFR BLD AUTO: 1 % (ref 0–6)
ERYTHROCYTE [DISTWIDTH] IN BLOOD BY AUTOMATED COUNT: 11.9 % (ref 11.6–15.1)
EST. AVERAGE GLUCOSE BLD GHB EST-MCNC: 134 MG/DL
GFR SERPL CREATININE-BSD FRML MDRD: 102 ML/MIN/1.73SQ M
GLUCOSE P FAST SERPL-MCNC: 107 MG/DL (ref 65–99)
HBA1C MFR BLD: 6.3 %
HCT VFR BLD AUTO: 41.1 % (ref 34.8–46.1)
HDLC SERPL-MCNC: 63 MG/DL
HGB BLD-MCNC: 13.5 G/DL (ref 11.5–15.4)
IMM GRANULOCYTES # BLD AUTO: 0.02 THOUSAND/UL (ref 0–0.2)
IMM GRANULOCYTES NFR BLD AUTO: 0 % (ref 0–2)
LDLC SERPL CALC-MCNC: 97 MG/DL (ref 0–100)
LYMPHOCYTES # BLD AUTO: 2.12 THOUSANDS/ΜL (ref 0.6–4.47)
LYMPHOCYTES NFR BLD AUTO: 29 % (ref 14–44)
MCH RBC QN AUTO: 32.2 PG (ref 26.8–34.3)
MCHC RBC AUTO-ENTMCNC: 32.8 G/DL (ref 31.4–37.4)
MCV RBC AUTO: 98 FL (ref 82–98)
MONOCYTES # BLD AUTO: 0.47 THOUSAND/ΜL (ref 0.17–1.22)
MONOCYTES NFR BLD AUTO: 7 % (ref 4–12)
NEUTROPHILS # BLD AUTO: 4.49 THOUSANDS/ΜL (ref 1.85–7.62)
NEUTS SEG NFR BLD AUTO: 62 % (ref 43–75)
NRBC BLD AUTO-RTO: 0 /100 WBCS
PLATELET # BLD AUTO: 253 THOUSANDS/UL (ref 149–390)
PMV BLD AUTO: 9.3 FL (ref 8.9–12.7)
POTASSIUM SERPL-SCNC: 4 MMOL/L (ref 3.5–5.3)
PROT SERPL-MCNC: 7.2 G/DL (ref 6.4–8.2)
RBC # BLD AUTO: 4.19 MILLION/UL (ref 3.81–5.12)
SODIUM SERPL-SCNC: 137 MMOL/L (ref 136–145)
TRIGL SERPL-MCNC: 43 MG/DL
TSH SERPL DL<=0.05 MIU/L-ACNC: 3.39 UIU/ML (ref 0.36–3.74)
WBC # BLD AUTO: 7.22 THOUSAND/UL (ref 4.31–10.16)

## 2020-02-11 PROCEDURE — 83036 HEMOGLOBIN GLYCOSYLATED A1C: CPT | Performed by: FAMILY MEDICINE

## 2020-02-11 PROCEDURE — 80053 COMPREHEN METABOLIC PANEL: CPT | Performed by: FAMILY MEDICINE

## 2020-02-11 PROCEDURE — 77063 BREAST TOMOSYNTHESIS BI: CPT

## 2020-02-11 PROCEDURE — 36415 COLL VENOUS BLD VENIPUNCTURE: CPT | Performed by: FAMILY MEDICINE

## 2020-02-11 PROCEDURE — 80061 LIPID PANEL: CPT | Performed by: FAMILY MEDICINE

## 2020-02-11 PROCEDURE — 84443 ASSAY THYROID STIM HORMONE: CPT | Performed by: FAMILY MEDICINE

## 2020-02-11 PROCEDURE — 77067 SCR MAMMO BI INCL CAD: CPT

## 2020-02-11 PROCEDURE — 85025 COMPLETE CBC W/AUTO DIFF WBC: CPT | Performed by: FAMILY MEDICINE

## 2020-02-25 ENCOUNTER — TELEPHONE (OUTPATIENT)
Dept: ADMINISTRATIVE | Facility: OTHER | Age: 60
End: 2020-02-25

## 2020-02-25 ENCOUNTER — OFFICE VISIT (OUTPATIENT)
Dept: FAMILY MEDICINE CLINIC | Facility: CLINIC | Age: 60
End: 2020-02-25
Payer: COMMERCIAL

## 2020-02-25 VITALS
DIASTOLIC BLOOD PRESSURE: 76 MMHG | HEIGHT: 68 IN | BODY MASS INDEX: 23.49 KG/M2 | SYSTOLIC BLOOD PRESSURE: 120 MMHG | WEIGHT: 155 LBS

## 2020-02-25 DIAGNOSIS — F41.9 ANXIETY: Primary | ICD-10-CM

## 2020-02-25 PROCEDURE — 3008F BODY MASS INDEX DOCD: CPT | Performed by: FAMILY MEDICINE

## 2020-02-25 PROCEDURE — 1036F TOBACCO NON-USER: CPT | Performed by: FAMILY MEDICINE

## 2020-02-25 PROCEDURE — 99213 OFFICE O/P EST LOW 20 MIN: CPT | Performed by: FAMILY MEDICINE

## 2020-02-25 RX ORDER — AMITRIPTYLINE HYDROCHLORIDE 25 MG/1
25 TABLET, FILM COATED ORAL
Qty: 30 TABLET | Refills: 1 | Status: ON HOLD | OUTPATIENT
Start: 2020-02-25 | End: 2020-09-18

## 2020-02-25 NOTE — TELEPHONE ENCOUNTER
Upon review of the In Basket request and the patient's chart, initial outreach has been made via telephone, please see Contacts section for details  A second outreach attempt will be made within 3 business days      Thank you  Melisa Richardson MA

## 2020-02-25 NOTE — TELEPHONE ENCOUNTER
----- Message from Emeli Mathias MA sent at 2/25/2020  7:56 AM EST -----  Regarding: Cervical Cancer Screening  Contact: [phone]    Sherice Jaime 02/25/20 7:56 AM    Hello, our patient Doreen Mccarty has had a total hysterectomy  completed/performed  Please assist in updating the patient chart by making an External outreach to [facility] located in [place]  The date of service is between 8434-2607      Thank you,  FRANK Branham PG

## 2020-02-25 NOTE — TELEPHONE ENCOUNTER
Upon review of your request/inquiry, we have found that the patient has aged out of the measure and/or the document date is outside of the measure timeframe  Any additional questions or concerns should be emailed to the Practice Liaisons via [email or web address]  org email, please do not reply via In Basket       Thank you  Iqra Turcios MA

## 2020-02-25 NOTE — PROGRESS NOTES
Assessment/Plan: We will try her on 25 mg of amitriptyline at bedtime p r n  Insomnia  She will call for the next couple days if it is working or not working  We will see her back in the next couple months or p r n     Problem List Items Addressed This Visit        Other    Anxiety - Primary    Relevant Medications    amitriptyline (ELAVIL) 25 mg tablet           Diagnoses and all orders for this visit:    Anxiety  -     amitriptyline (ELAVIL) 25 mg tablet; Take 1 tablet (25 mg total) by mouth daily at bedtime as needed for sleep        No problem-specific Assessment & Plan notes found for this encounter  Subjective:      Patient ID: Kush Lazaro is a 63 y o  female  Patient here today for follow-up  Patient was intolerant of the sertraline  It made her nauseous, so she stopped it  Patient still has trouble sleep  That is her main concern that she wakes up frequently and tosses and turns  She denies any SI or HI  Anxiety   Presents for follow-up visit  Symptoms include insomnia and nervous/anxious behavior  Patient reports no suicidal ideas  Symptoms occur most days  The severity of symptoms is moderate  The quality of sleep is poor  Nighttime awakenings: several            The following portions of the patient's history were reviewed and updated as appropriate:   She has no past medical history on file  ,  does not have any pertinent problems on file  ,   has a past surgical history that includes Bladder surgery; 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); Esophagogastroduodenoscopy (2013); and Hysterectomy (2012)  ,  family history includes No Known Problems in her father, maternal grandfather, maternal grandmother, mother, paternal grandfather, paternal grandmother, sister, sister, and sister  ,   reports that she has quit smoking  She has never used smokeless tobacco  She reports that she drinks alcohol  She reports that she does not use drugs  ,  has No Known Allergies     Current Outpatient Medications   Medication Sig Dispense Refill    DAILY MULTIPLE VITAMINS/IRON TABS Take by mouth      pantoprazole (PROTONIX) 40 mg tablet Take 1 tablet (40 mg total) by mouth daily 90 tablet 3    simvastatin (ZOCOR) 40 mg tablet Take 1 tablet (40 mg total) by mouth every evening 90 tablet 3    amitriptyline (ELAVIL) 25 mg tablet Take 1 tablet (25 mg total) by mouth daily at bedtime as needed for sleep 30 tablet 1     No current facility-administered medications for this visit  Review of Systems   Constitutional: Negative  Respiratory: Negative  Cardiovascular: Negative  Gastrointestinal: Negative  Genitourinary: Negative  Psychiatric/Behavioral: Positive for sleep disturbance  Negative for suicidal ideas  The patient is nervous/anxious and has insomnia  Objective:  Vitals:    02/25/20 0749   BP: 120/76   Weight: 70 3 kg (155 lb)   Height: 5' 8" (1 727 m)     Body mass index is 23 57 kg/m²  Physical Exam   Constitutional: She is oriented to person, place, and time  She appears well-developed and well-nourished  No distress  HENT:   Head: Normocephalic and atraumatic  Eyes: Conjunctivae are normal    Cardiovascular: Normal rate, regular rhythm and normal heart sounds  Exam reveals no gallop and no friction rub  No murmur heard  Pulmonary/Chest: Effort normal and breath sounds normal  No respiratory distress  She has no wheezes  She has no rales  Musculoskeletal: She exhibits no edema  Neurological: She is alert and oriented to person, place, and time  Skin: She is not diaphoretic  Psychiatric: She has a normal mood and affect  Her behavior is normal  Judgment and thought content normal    Vitals reviewed

## 2020-06-25 ENCOUNTER — OFFICE VISIT (OUTPATIENT)
Dept: FAMILY MEDICINE CLINIC | Facility: CLINIC | Age: 60
End: 2020-06-25
Payer: COMMERCIAL

## 2020-06-25 VITALS
HEIGHT: 68 IN | TEMPERATURE: 98.2 F | DIASTOLIC BLOOD PRESSURE: 80 MMHG | WEIGHT: 156 LBS | BODY MASS INDEX: 23.64 KG/M2 | SYSTOLIC BLOOD PRESSURE: 138 MMHG

## 2020-06-25 DIAGNOSIS — R10.11 RUQ PAIN: ICD-10-CM

## 2020-06-25 DIAGNOSIS — K42.9 UMBILICAL HERNIA WITHOUT OBSTRUCTION AND WITHOUT GANGRENE: Primary | ICD-10-CM

## 2020-06-25 PROCEDURE — 3008F BODY MASS INDEX DOCD: CPT | Performed by: FAMILY MEDICINE

## 2020-06-25 PROCEDURE — 1036F TOBACCO NON-USER: CPT | Performed by: FAMILY MEDICINE

## 2020-06-25 PROCEDURE — 99213 OFFICE O/P EST LOW 20 MIN: CPT | Performed by: FAMILY MEDICINE

## 2020-07-09 ENCOUNTER — HOSPITAL ENCOUNTER (OUTPATIENT)
Dept: ULTRASOUND IMAGING | Facility: HOSPITAL | Age: 60
Discharge: HOME | End: 2020-07-09
Payer: COMMERCIAL

## 2020-07-09 DIAGNOSIS — K42.9 UMBILICAL HERNIA WITHOUT OBSTRUCTION AND WITHOUT GANGRENE: ICD-10-CM

## 2020-07-09 DIAGNOSIS — R10.11 RUQ PAIN: ICD-10-CM

## 2020-07-09 PROCEDURE — 76705 ECHO EXAM OF ABDOMEN: CPT

## 2020-07-23 ENCOUNTER — CONSULT (OUTPATIENT)
Dept: SURGERY | Facility: CLINIC | Age: 60
End: 2020-07-23
Payer: COMMERCIAL

## 2020-07-23 VITALS
DIASTOLIC BLOOD PRESSURE: 78 MMHG | SYSTOLIC BLOOD PRESSURE: 143 MMHG | HEIGHT: 68 IN | TEMPERATURE: 98 F | HEART RATE: 68 BPM | WEIGHT: 155 LBS | BODY MASS INDEX: 23.49 KG/M2

## 2020-07-23 DIAGNOSIS — R10.10 PAIN OF UPPER ABDOMEN: Primary | ICD-10-CM

## 2020-07-23 DIAGNOSIS — K42.9 UMBILICAL HERNIA WITHOUT OBSTRUCTION AND WITHOUT GANGRENE: ICD-10-CM

## 2020-07-23 DIAGNOSIS — R10.11 RUQ PAIN: ICD-10-CM

## 2020-07-23 DIAGNOSIS — K43.9 VENTRAL HERNIA WITHOUT OBSTRUCTION OR GANGRENE: ICD-10-CM

## 2020-07-23 PROCEDURE — 3008F BODY MASS INDEX DOCD: CPT | Performed by: SURGERY

## 2020-07-23 PROCEDURE — 99244 OFF/OP CNSLTJ NEW/EST MOD 40: CPT | Performed by: SURGERY

## 2020-07-26 PROBLEM — R10.10 PAIN OF UPPER ABDOMEN: Status: ACTIVE | Noted: 2020-07-26

## 2020-07-26 RX ORDER — CHLORHEXIDINE GLUCONATE 4 G/100ML
SOLUTION TOPICAL DAILY PRN
Status: CANCELLED | OUTPATIENT
Start: 2020-07-26

## 2020-07-26 RX ORDER — SODIUM CHLORIDE, SODIUM LACTATE, POTASSIUM CHLORIDE, CALCIUM CHLORIDE 600; 310; 30; 20 MG/100ML; MG/100ML; MG/100ML; MG/100ML
125 INJECTION, SOLUTION INTRAVENOUS CONTINUOUS
Status: CANCELLED | OUTPATIENT
Start: 2020-09-18

## 2020-07-26 RX ORDER — HEPARIN SODIUM 5000 [USP'U]/ML
5000 INJECTION, SOLUTION INTRAVENOUS; SUBCUTANEOUS ONCE
Status: CANCELLED | OUTPATIENT
Start: 2020-09-18 | End: 2020-07-26

## 2020-07-26 RX ORDER — CEFAZOLIN SODIUM 1 G/50ML
1000 SOLUTION INTRAVENOUS ONCE
Status: CANCELLED | OUTPATIENT
Start: 2020-09-18 | End: 2020-07-26

## 2020-07-26 NOTE — H&P
Assessment/Plan:    Pain of upper abdomen  Upper abdominal pain epigastric/right upper quadrant of unknown etiology  It does not fit associated with GERD or gallbladder symptoms  Patient does state it is worse with coughing sneezing movement and is suggest that there is a popping out feeling in that area  There is some point tenderness which I can reproduce on exam   No obvious hernia noted  Furthermore no hernia noted on recent ultrasound of this area nor on previous CT of this area done years ago when she also had similar pain  She states this was also evaluated by previous physician who felt it was a muscle strain years ago  On ultrasound she does have a umbilical and supraumbilical ventral hernia  Unclear if this could be some referred pain  We will continue with laparoscopic repair of these other 2 hernias and will also evaluate the area of concern laparoscopically  If there is a 3rd hernia in this particular spot will also repair this  Umbilical hernia without obstruction and without gangrene  Symptomatic umbilical/ventral hernia  Ultrasound showing both small umbilical and supraumbilical/ventral hernia  These 2 years are symptomatic occasionally and could be contributing to her other point tenderness in more the upper abdomen  Patient is agreeable to pursue laparoscopic repair of these 2 hernias with mesh  I explained the patient that I am hopeful that the repair of these hernias may contribute to decrease in her overall abdominal pain  However I cannot guarantee that repair of these hernias will completely resolve her other point tenderness  The procedure self including all associated risks and benefits were discussed the patient great length  The patient verbalized understand these risks is willing proceed, consent was signed  She will require preoperative EKG and COVID testing prior to surgery         Diagnoses and all orders for this visit:    Pain of upper abdomen    Umbilical hernia without obstruction and without gangrene  -     Ambulatory referral to General Surgery    RUQ pain  -     Ambulatory referral to General Surgery          Subjective:      Patient ID: Yimi Mcmahan is a 63 y o  female  27-year-old female with no significant past medical history, presents today in consultation for evaluation of abdominal pain  Patient states she has had this upper abdominal pain slightly to the right for long period of time  She was evaluated by Dr Bruno Watters years ago who states this was likely a muscle strain  She had a CT scan at that time which was unremarkable  She currently denies any trauma  She states that she does feel a bulge especially if she coughs or with increased activity  This area usually Pops out usually goes back in  The pain is sharp sometimes achy and usually limited and localized to the upper abdomen  She has had laparoscopic surgery for bladder which I believe she had mesh inserted  She recently seen by her PCP ordered an ultrasound  Ultrasound shows 2 small hernias one at the umbilicus and one that is supraumbilical hernia  Patient states she has some mild discomfort in this area but the compared to the area of the upper abdomen  She states that if she does push in the area near the umbilicus she does have some some discomfort  She does see a small bulge just above the umbilicus as well  No nausea vomiting  No fevers or chills  No changes in bowel or bladder habits  Patient is otherwise healthy and denies any current chest pain or worsening shortness of breath  The following portions of the patient's history were reviewed and updated as appropriate:   She  has no past medical history on file    She   Patient Active Problem List    Diagnosis Date Noted    Pain of upper abdomen 18/28/2794    Umbilical hernia without obstruction and without gangrene 06/25/2020    Anxiety 01/21/2020    Chronic bilateral low back pain without sciatica 01/22/2019    Subclinical hypothyroidism 03/27/2018    Vitreous floaters of both eyes 07/03/2014    Gastroesophageal reflux disease without esophagitis 10/29/2012    Hyperglycemia 10/29/2012    Other hyperlipidemia 10/29/2012    Sciatica 10/29/2012     She  has a past surgical history that includes Bladder surgery; 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); Esophagogastroduodenoscopy (2013); and Hysterectomy (2012)  Her family history includes No Known Problems in her father, maternal grandfather, maternal grandmother, mother, paternal grandfather, paternal grandmother, sister, sister, and sister  She  reports that she has quit smoking  She has never used smokeless tobacco  She reports that she drinks alcohol  She reports that she does not use drugs  Current Outpatient Medications   Medication Sig Dispense Refill    amitriptyline (ELAVIL) 25 mg tablet Take 1 tablet (25 mg total) by mouth daily at bedtime as needed for sleep (Patient not taking: Reported on 6/25/2020) 30 tablet 1    DAILY MULTIPLE VITAMINS/IRON TABS Take by mouth      pantoprazole (PROTONIX) 40 mg tablet Take 1 tablet (40 mg total) by mouth daily 90 tablet 3    simvastatin (ZOCOR) 40 mg tablet Take 1 tablet (40 mg total) by mouth every evening 90 tablet 3     No current facility-administered medications for this visit  She has No Known Allergies       Review of Systems      Review of systems completed, all negative except as noted above HPI  Objective:      /78   Pulse 68   Temp 98 °F (36 7 °C)   Ht 5' 8" (1 727 m)   Wt 70 3 kg (155 lb)   BMI 23 57 kg/m²           Physical Exam   Constitutional: She is oriented to person, place, and time  She appears well-nourished  No distress  HENT:   Head: Normocephalic and atraumatic  Eyes: No scleral icterus  Neck: Normal range of motion  Neck supple  No tracheal deviation present  Cardiovascular: Normal rate, regular rhythm and normal heart sounds   Exam reveals no gallop and no friction rub  No murmur heard  Pulmonary/Chest: Effort normal and breath sounds normal  No stridor  No respiratory distress  She has no wheezes  She has no rales  She exhibits no tenderness  Abdominal: Soft  She exhibits no distension and no mass  There is tenderness (Point tenderness in the epigastric stress upper quadrantt, in addition there is tenderness in the periumbilical region  )  There is no rebound and no guarding  A hernia ( palpable umbilical and supraumbilical hernia ) is present  Overall abdomen is flat  There is a palpable or umbilical and supraumbilical defect which were small  There is point tenderness to palpation as described before in the epigastric/right upper quadrant  No evidence of palpable defect at that area  Nothing that pops in or out  Musculoskeletal: Normal range of motion  She exhibits no edema, tenderness or deformity  Lymphadenopathy:     She has no cervical adenopathy  Neurological: She is alert and oriented to person, place, and time  No cranial nerve deficit  Coordination normal    Skin: Skin is warm  No rash noted  She is not diaphoretic  No erythema  No pallor  Psychiatric: She has a normal mood and affect  Her behavior is normal  Judgment and thought content normal    Vitals reviewed

## 2020-07-26 NOTE — PROGRESS NOTES
Assessment/Plan:    Pain of upper abdomen  Upper abdominal pain epigastric/right upper quadrant of unknown etiology  It does not fit associated with GERD or gallbladder symptoms  Patient does state it is worse with coughing sneezing movement and is suggest that there is a popping out feeling in that area  There is some point tenderness which I can reproduce on exam   No obvious hernia noted  Furthermore no hernia noted on recent ultrasound of this area nor on previous CT of this area done years ago when she also had similar pain  She states this was also evaluated by previous physician who felt it was a muscle strain years ago  On ultrasound she does have a umbilical and supraumbilical ventral hernia  Unclear if this could be some referred pain  We will continue with laparoscopic repair of these other 2 hernias and will also evaluate the area of concern laparoscopically  If there is a 3rd hernia in this particular spot will also repair this  Umbilical hernia without obstruction and without gangrene  Symptomatic umbilical/ventral hernia  Ultrasound showing both small umbilical and supraumbilical/ventral hernia  These 2 years are symptomatic occasionally and could be contributing to her other point tenderness in more the upper abdomen  Patient is agreeable to pursue laparoscopic repair of these 2 hernias with mesh  I explained the patient that I am hopeful that the repair of these hernias may contribute to decrease in her overall abdominal pain  However I cannot guarantee that repair of these hernias will completely resolve her other point tenderness  The procedure self including all associated risks and benefits were discussed the patient great length  The patient verbalized understand these risks is willing proceed, consent was signed  She will require preoperative EKG and COVID testing prior to surgery         Diagnoses and all orders for this visit:    Pain of upper abdomen    Umbilical hernia without obstruction and without gangrene  -     Ambulatory referral to General Surgery    RUQ pain  -     Ambulatory referral to General Surgery          Subjective:      Patient ID: Luan Sanchez is a 63 y o  female  54-year-old female with no significant past medical history, presents today in consultation for evaluation of abdominal pain  Patient states she has had this upper abdominal pain slightly to the right for long period of time  She was evaluated by Dr Painter April years ago who states this was likely a muscle strain  She had a CT scan at that time which was unremarkable  She currently denies any trauma  She states that she does feel a bulge especially if she coughs or with increased activity  This area usually Pops out usually goes back in  The pain is sharp sometimes achy and usually limited and localized to the upper abdomen  She has had laparoscopic surgery for bladder which I believe she had mesh inserted  She recently seen by her PCP ordered an ultrasound  Ultrasound shows 2 small hernias one at the umbilicus and one that is supraumbilical hernia  Patient states she has some mild discomfort in this area but the compared to the area of the upper abdomen  She states that if she does push in the area near the umbilicus she does have some some discomfort  She does see a small bulge just above the umbilicus as well  No nausea vomiting  No fevers or chills  No changes in bowel or bladder habits  Patient is otherwise healthy and denies any current chest pain or worsening shortness of breath  The following portions of the patient's history were reviewed and updated as appropriate:   She  has no past medical history on file    She   Patient Active Problem List    Diagnosis Date Noted    Pain of upper abdomen 51/23/5078    Umbilical hernia without obstruction and without gangrene 06/25/2020    Anxiety 01/21/2020    Chronic bilateral low back pain without sciatica 01/22/2019    Subclinical hypothyroidism 03/27/2018    Vitreous floaters of both eyes 07/03/2014    Gastroesophageal reflux disease without esophagitis 10/29/2012    Hyperglycemia 10/29/2012    Other hyperlipidemia 10/29/2012    Sciatica 10/29/2012     She  has a past surgical history that includes Bladder surgery; 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); Esophagogastroduodenoscopy (2013); and Hysterectomy (2012)  Her family history includes No Known Problems in her father, maternal grandfather, maternal grandmother, mother, paternal grandfather, paternal grandmother, sister, sister, and sister  She  reports that she has quit smoking  She has never used smokeless tobacco  She reports that she drinks alcohol  She reports that she does not use drugs  Current Outpatient Medications   Medication Sig Dispense Refill    amitriptyline (ELAVIL) 25 mg tablet Take 1 tablet (25 mg total) by mouth daily at bedtime as needed for sleep (Patient not taking: Reported on 6/25/2020) 30 tablet 1    DAILY MULTIPLE VITAMINS/IRON TABS Take by mouth      pantoprazole (PROTONIX) 40 mg tablet Take 1 tablet (40 mg total) by mouth daily 90 tablet 3    simvastatin (ZOCOR) 40 mg tablet Take 1 tablet (40 mg total) by mouth every evening 90 tablet 3     No current facility-administered medications for this visit  She has No Known Allergies       Review of Systems      Review of systems completed, all negative except as noted above HPI  Objective:      /78   Pulse 68   Temp 98 °F (36 7 °C)   Ht 5' 8" (1 727 m)   Wt 70 3 kg (155 lb)   BMI 23 57 kg/m²          Physical Exam   Constitutional: She is oriented to person, place, and time  She appears well-nourished  No distress  HENT:   Head: Normocephalic and atraumatic  Eyes: No scleral icterus  Neck: Normal range of motion  Neck supple  No tracheal deviation present  Cardiovascular: Normal rate, regular rhythm and normal heart sounds   Exam reveals no gallop and no friction rub  No murmur heard  Pulmonary/Chest: Effort normal and breath sounds normal  No stridor  No respiratory distress  She has no wheezes  She has no rales  She exhibits no tenderness  Abdominal: Soft  She exhibits no distension and no mass  There is tenderness (Point tenderness in the epigastric stress upper quadrantt, in addition there is tenderness in the periumbilical region  )  There is no rebound and no guarding  A hernia ( palpable umbilical and supraumbilical hernia ) is present  Overall abdomen is flat  There is a palpable or umbilical and supraumbilical defect which were small  There is point tenderness to palpation as described before in the epigastric/right upper quadrant  No evidence of palpable defect at that area  Nothing that pops in or out  Musculoskeletal: Normal range of motion  She exhibits no edema, tenderness or deformity  Lymphadenopathy:     She has no cervical adenopathy  Neurological: She is alert and oriented to person, place, and time  No cranial nerve deficit  Coordination normal    Skin: Skin is warm  No rash noted  She is not diaphoretic  No erythema  No pallor  Psychiatric: She has a normal mood and affect  Her behavior is normal  Judgment and thought content normal    Vitals reviewed

## 2020-07-26 NOTE — ASSESSMENT & PLAN NOTE
Upper abdominal pain epigastric/right upper quadrant of unknown etiology  It does not fit associated with GERD or gallbladder symptoms  Patient does state it is worse with coughing sneezing movement and is suggest that there is a popping out feeling in that area  There is some point tenderness which I can reproduce on exam   No obvious hernia noted  Furthermore no hernia noted on recent ultrasound of this area nor on previous CT of this area done years ago when she also had similar pain  She states this was also evaluated by previous physician who felt it was a muscle strain years ago  On ultrasound she does have a umbilical and supraumbilical ventral hernia  Unclear if this could be some referred pain  We will continue with laparoscopic repair of these other 2 hernias and will also evaluate the area of concern laparoscopically  If there is a 3rd hernia in this particular spot will also repair this

## 2020-09-08 ENCOUNTER — HOSPITAL ENCOUNTER (OUTPATIENT)
Dept: NON INVASIVE DIAGNOSTICS | Facility: HOSPITAL | Age: 60
Discharge: HOME | End: 2020-09-08
Attending: SURGERY
Payer: COMMERCIAL

## 2020-09-08 DIAGNOSIS — K42.9 UMBILICAL HERNIA WITHOUT OBSTRUCTION AND WITHOUT GANGRENE: ICD-10-CM

## 2020-09-08 DIAGNOSIS — K43.9 VENTRAL HERNIA WITHOUT OBSTRUCTION OR GANGRENE: ICD-10-CM

## 2020-09-08 LAB
ATRIAL RATE: 67 BPM
P AXIS: 80 DEGREES
PR INTERVAL: 140 MS
QRS AXIS: 79 DEGREES
QRSD INTERVAL: 100 MS
QT INTERVAL: 420 MS
QTC INTERVAL: 443 MS
T WAVE AXIS: 74 DEGREES
VENTRICULAR RATE: 67 BPM

## 2020-09-08 PROCEDURE — 93005 ELECTROCARDIOGRAM TRACING: CPT

## 2020-09-08 PROCEDURE — 93010 ELECTROCARDIOGRAM REPORT: CPT | Performed by: INTERNAL MEDICINE

## 2020-09-17 ENCOUNTER — ANESTHESIA EVENT (OUTPATIENT)
Dept: PERIOP | Facility: HOSPITAL | Age: 60
End: 2020-09-17
Payer: COMMERCIAL

## 2020-09-18 ENCOUNTER — HOSPITAL ENCOUNTER (OUTPATIENT)
Facility: HOSPITAL | Age: 60
Setting detail: OUTPATIENT SURGERY
Discharge: HOME | End: 2020-09-18
Attending: SURGERY | Admitting: SURGERY
Payer: COMMERCIAL

## 2020-09-18 ENCOUNTER — ANESTHESIA (OUTPATIENT)
Dept: PERIOP | Facility: HOSPITAL | Age: 60
End: 2020-09-18
Payer: COMMERCIAL

## 2020-09-18 VITALS
HEART RATE: 64 BPM | TEMPERATURE: 97.4 F | BODY MASS INDEX: 23.49 KG/M2 | SYSTOLIC BLOOD PRESSURE: 162 MMHG | OXYGEN SATURATION: 98 % | RESPIRATION RATE: 20 BRPM | DIASTOLIC BLOOD PRESSURE: 64 MMHG | WEIGHT: 155 LBS | HEIGHT: 68 IN

## 2020-09-18 VITALS — HEART RATE: 101 BPM

## 2020-09-18 DIAGNOSIS — K42.9 UMBILICAL HERNIA WITHOUT OBSTRUCTION AND WITHOUT GANGRENE: Primary | ICD-10-CM

## 2020-09-18 PROCEDURE — NC001 PR NO CHARGE: Performed by: PHYSICIAN ASSISTANT

## 2020-09-18 PROCEDURE — 49652 PR LAP, VENTRAL HERNIA REPAIR,REDUCIBLE: CPT | Performed by: SURGERY

## 2020-09-18 PROCEDURE — NC001 PR NO CHARGE: Performed by: SURGERY

## 2020-09-18 PROCEDURE — 49652 PR LAP, VENTRAL HERNIA REPAIR,REDUCIBLE: CPT | Performed by: PHYSICIAN ASSISTANT

## 2020-09-18 PROCEDURE — C1781 MESH (IMPLANTABLE): HCPCS | Performed by: SURGERY

## 2020-09-18 DEVICE — VENTRALIGHT ST MESH WITH ECHO PS POSITONING SYSTEM
Type: IMPLANTABLE DEVICE | Status: FUNCTIONAL
Brand: VENTRALIGHT ST MESH WITH ECHO PS POSITONING SYSTEM

## 2020-09-18 DEVICE — CAPSURE PERMANENT FIXATION SYSTEM 30 PERMANENT FASTENERS
Type: IMPLANTABLE DEVICE | Site: ABDOMEN | Status: FUNCTIONAL
Brand: CAPSURE PERMANENT FIXATION SYSTEM

## 2020-09-18 RX ORDER — MORPHINE SULFATE 4 MG/ML
2 INJECTION, SOLUTION INTRAMUSCULAR; INTRAVENOUS EVERY 4 HOURS PRN
Status: DISCONTINUED | OUTPATIENT
Start: 2020-09-18 | End: 2020-09-18 | Stop reason: HOSPADM

## 2020-09-18 RX ORDER — PROPOFOL 10 MG/ML
INJECTION, EMULSION INTRAVENOUS AS NEEDED
Status: DISCONTINUED | OUTPATIENT
Start: 2020-09-18 | End: 2020-09-18

## 2020-09-18 RX ORDER — SUCCINYLCHOLINE/SOD CL,ISO/PF 100 MG/5ML
SYRINGE (ML) INTRAVENOUS AS NEEDED
Status: DISCONTINUED | OUTPATIENT
Start: 2020-09-18 | End: 2020-09-18

## 2020-09-18 RX ORDER — ONDANSETRON 2 MG/ML
4 INJECTION INTRAMUSCULAR; INTRAVENOUS EVERY 8 HOURS PRN
Status: DISCONTINUED | OUTPATIENT
Start: 2020-09-18 | End: 2020-09-18 | Stop reason: HOSPADM

## 2020-09-18 RX ORDER — DIPHENHYDRAMINE HYDROCHLORIDE 50 MG/ML
12.5 INJECTION INTRAMUSCULAR; INTRAVENOUS ONCE AS NEEDED
Status: DISCONTINUED | OUTPATIENT
Start: 2020-09-18 | End: 2020-09-18 | Stop reason: HOSPADM

## 2020-09-18 RX ORDER — SODIUM CHLORIDE, SODIUM LACTATE, POTASSIUM CHLORIDE, CALCIUM CHLORIDE 600; 310; 30; 20 MG/100ML; MG/100ML; MG/100ML; MG/100ML
125 INJECTION, SOLUTION INTRAVENOUS CONTINUOUS
Status: DISCONTINUED | OUTPATIENT
Start: 2020-09-18 | End: 2020-09-18

## 2020-09-18 RX ORDER — KETOROLAC TROMETHAMINE 30 MG/ML
INJECTION, SOLUTION INTRAMUSCULAR; INTRAVENOUS AS NEEDED
Status: DISCONTINUED | OUTPATIENT
Start: 2020-09-18 | End: 2020-09-18

## 2020-09-18 RX ORDER — FENTANYL CITRATE/PF 50 MCG/ML
25 SYRINGE (ML) INJECTION
Status: COMPLETED | OUTPATIENT
Start: 2020-09-18 | End: 2020-09-18

## 2020-09-18 RX ORDER — CHLORHEXIDINE GLUCONATE 4 G/100ML
SOLUTION TOPICAL DAILY PRN
Status: DISCONTINUED | OUTPATIENT
Start: 2020-09-18 | End: 2020-09-18 | Stop reason: HOSPADM

## 2020-09-18 RX ORDER — ROCURONIUM BROMIDE 10 MG/ML
INJECTION, SOLUTION INTRAVENOUS AS NEEDED
Status: DISCONTINUED | OUTPATIENT
Start: 2020-09-18 | End: 2020-09-18

## 2020-09-18 RX ORDER — HEPARIN SODIUM 5000 [USP'U]/ML
5000 INJECTION, SOLUTION INTRAVENOUS; SUBCUTANEOUS ONCE
Status: COMPLETED | OUTPATIENT
Start: 2020-09-18 | End: 2020-09-18

## 2020-09-18 RX ORDER — ONDANSETRON 2 MG/ML
INJECTION INTRAMUSCULAR; INTRAVENOUS AS NEEDED
Status: DISCONTINUED | OUTPATIENT
Start: 2020-09-18 | End: 2020-09-18

## 2020-09-18 RX ORDER — MIDAZOLAM HYDROCHLORIDE 2 MG/2ML
INJECTION, SOLUTION INTRAMUSCULAR; INTRAVENOUS AS NEEDED
Status: DISCONTINUED | OUTPATIENT
Start: 2020-09-18 | End: 2020-09-18

## 2020-09-18 RX ORDER — CEFAZOLIN SODIUM 1 G/50ML
1000 SOLUTION INTRAVENOUS ONCE
Status: COMPLETED | OUTPATIENT
Start: 2020-09-18 | End: 2020-09-18

## 2020-09-18 RX ORDER — LIDOCAINE HYDROCHLORIDE 10 MG/ML
INJECTION, SOLUTION EPIDURAL; INFILTRATION; INTRACAUDAL; PERINEURAL AS NEEDED
Status: DISCONTINUED | OUTPATIENT
Start: 2020-09-18 | End: 2020-09-18

## 2020-09-18 RX ORDER — OXYCODONE HYDROCHLORIDE AND ACETAMINOPHEN 5; 325 MG/1; MG/1
1 TABLET ORAL EVERY 4 HOURS PRN
Qty: 20 TABLET | Refills: 0 | Status: SHIPPED | OUTPATIENT
Start: 2020-09-18 | End: 2020-10-05

## 2020-09-18 RX ORDER — EPHEDRINE SULFATE 50 MG/ML
INJECTION INTRAVENOUS AS NEEDED
Status: DISCONTINUED | OUTPATIENT
Start: 2020-09-18 | End: 2020-09-18

## 2020-09-18 RX ORDER — FENTANYL CITRATE 50 UG/ML
INJECTION, SOLUTION INTRAMUSCULAR; INTRAVENOUS AS NEEDED
Status: DISCONTINUED | OUTPATIENT
Start: 2020-09-18 | End: 2020-09-18

## 2020-09-18 RX ORDER — HYDROMORPHONE HCL/PF 1 MG/ML
SYRINGE (ML) INJECTION AS NEEDED
Status: DISCONTINUED | OUTPATIENT
Start: 2020-09-18 | End: 2020-09-18

## 2020-09-18 RX ORDER — TURMERIC 400 MG
800 CAPSULE ORAL 2 TIMES DAILY
COMMUNITY

## 2020-09-18 RX ORDER — HYDROMORPHONE HCL/PF 1 MG/ML
0.2 SYRINGE (ML) INJECTION
Status: DISCONTINUED | OUTPATIENT
Start: 2020-09-18 | End: 2020-09-18 | Stop reason: HOSPADM

## 2020-09-18 RX ORDER — GLYCOPYRROLATE 0.2 MG/ML
INJECTION INTRAMUSCULAR; INTRAVENOUS AS NEEDED
Status: DISCONTINUED | OUTPATIENT
Start: 2020-09-18 | End: 2020-09-18

## 2020-09-18 RX ORDER — DEXAMETHASONE SODIUM PHOSPHATE 10 MG/ML
INJECTION, SOLUTION INTRAMUSCULAR; INTRAVENOUS AS NEEDED
Status: DISCONTINUED | OUTPATIENT
Start: 2020-09-18 | End: 2020-09-18

## 2020-09-18 RX ORDER — OXYCODONE HYDROCHLORIDE AND ACETAMINOPHEN 5; 325 MG/1; MG/1
2 TABLET ORAL EVERY 4 HOURS PRN
Status: DISCONTINUED | OUTPATIENT
Start: 2020-09-18 | End: 2020-09-18 | Stop reason: HOSPADM

## 2020-09-18 RX ORDER — SODIUM CHLORIDE, SODIUM LACTATE, POTASSIUM CHLORIDE, CALCIUM CHLORIDE 600; 310; 30; 20 MG/100ML; MG/100ML; MG/100ML; MG/100ML
125 INJECTION, SOLUTION INTRAVENOUS CONTINUOUS
Status: DISCONTINUED | OUTPATIENT
Start: 2020-09-18 | End: 2020-09-18 | Stop reason: HOSPADM

## 2020-09-18 RX ORDER — BUPIVACAINE HYDROCHLORIDE 5 MG/ML
INJECTION, SOLUTION PERINEURAL AS NEEDED
Status: DISCONTINUED | OUTPATIENT
Start: 2020-09-18 | End: 2020-09-18 | Stop reason: HOSPADM

## 2020-09-18 RX ADMIN — HYDROMORPHONE HYDROCHLORIDE 0.2 MG: 1 INJECTION, SOLUTION INTRAMUSCULAR; INTRAVENOUS; SUBCUTANEOUS at 13:46

## 2020-09-18 RX ADMIN — FENTANYL CITRATE 25 MCG: 50 INJECTION, SOLUTION INTRAMUSCULAR; INTRAVENOUS at 13:19

## 2020-09-18 RX ADMIN — ONDANSETRON HYDROCHLORIDE 4 MG: 2 INJECTION, SOLUTION INTRAVENOUS at 11:57

## 2020-09-18 RX ADMIN — EPHEDRINE SULFATE 20 MG: 50 INJECTION, SOLUTION INTRAVENOUS at 12:18

## 2020-09-18 RX ADMIN — HYDROMORPHONE HYDROCHLORIDE 0.2 MG: 1 INJECTION, SOLUTION INTRAMUSCULAR; INTRAVENOUS; SUBCUTANEOUS at 13:59

## 2020-09-18 RX ADMIN — DEXAMETHASONE SODIUM PHOSPHATE 4 MG: 10 INJECTION, SOLUTION INTRAMUSCULAR; INTRAVENOUS at 11:59

## 2020-09-18 RX ADMIN — EPHEDRINE SULFATE 10 MG: 50 INJECTION, SOLUTION INTRAVENOUS at 12:15

## 2020-09-18 RX ADMIN — FENTANYL CITRATE 25 MCG: 50 INJECTION, SOLUTION INTRAMUSCULAR; INTRAVENOUS at 13:27

## 2020-09-18 RX ADMIN — Medication 100 MG: at 11:58

## 2020-09-18 RX ADMIN — MIDAZOLAM HYDROCHLORIDE 2 MG: 1 INJECTION, SOLUTION INTRAMUSCULAR; INTRAVENOUS at 11:48

## 2020-09-18 RX ADMIN — FENTANYL CITRATE 25 MCG: 50 INJECTION, SOLUTION INTRAMUSCULAR; INTRAVENOUS at 13:33

## 2020-09-18 RX ADMIN — HYDROMORPHONE HYDROCHLORIDE 0.2 MG: 1 INJECTION, SOLUTION INTRAMUSCULAR; INTRAVENOUS; SUBCUTANEOUS at 13:51

## 2020-09-18 RX ADMIN — PROPOFOL 150 MG: 10 INJECTION, EMULSION INTRAVENOUS at 11:57

## 2020-09-18 RX ADMIN — CEFAZOLIN SODIUM 1000 MG: 1 SOLUTION INTRAVENOUS at 11:50

## 2020-09-18 RX ADMIN — FENTANYL CITRATE 25 MCG: 50 INJECTION, SOLUTION INTRAMUSCULAR; INTRAVENOUS at 13:23

## 2020-09-18 RX ADMIN — SODIUM CHLORIDE, SODIUM LACTATE, POTASSIUM CHLORIDE, AND CALCIUM CHLORIDE 125 ML/HR: .6; .31; .03; .02 INJECTION, SOLUTION INTRAVENOUS at 10:30

## 2020-09-18 RX ADMIN — FENTANYL CITRATE 50 MCG: 50 INJECTION, SOLUTION INTRAMUSCULAR; INTRAVENOUS at 12:06

## 2020-09-18 RX ADMIN — ROCURONIUM BROMIDE 10 MG: 50 INJECTION, SOLUTION INTRAVENOUS at 11:57

## 2020-09-18 RX ADMIN — GLYCOPYRROLATE 0.2 MG: 0.2 INJECTION, SOLUTION INTRAMUSCULAR; INTRAVENOUS at 11:48

## 2020-09-18 RX ADMIN — LIDOCAINE HYDROCHLORIDE 50 MG: 10 INJECTION, SOLUTION EPIDURAL; INFILTRATION; INTRACAUDAL; PERINEURAL at 11:57

## 2020-09-18 RX ADMIN — HYDROMORPHONE HYDROCHLORIDE 0.5 MG: 1 INJECTION, SOLUTION INTRAMUSCULAR; INTRAVENOUS; SUBCUTANEOUS at 12:26

## 2020-09-18 RX ADMIN — FENTANYL CITRATE 50 MCG: 50 INJECTION, SOLUTION INTRAMUSCULAR; INTRAVENOUS at 11:57

## 2020-09-18 RX ADMIN — KETOROLAC TROMETHAMINE 30 MG: 30 INJECTION, SOLUTION INTRAMUSCULAR at 12:46

## 2020-09-18 RX ADMIN — HEPARIN SODIUM 5000 UNITS: 5000 INJECTION INTRAVENOUS; SUBCUTANEOUS at 10:14

## 2020-09-18 NOTE — ANESTHESIA PREPROCEDURE EVALUATION
Procedure:  REPAIR HERNIA VENTRAL LAPAROSCOPIC (N/A Abdomen)    Relevant Problems   CARDIO   (+) Other hyperlipidemia      ENDO   (+) Subclinical hypothyroidism      GI/HEPATIC   (+) Gastroesophageal reflux disease without esophagitis      MUSCULOSKELETAL   (+) Chronic bilateral low back pain without sciatica   (+) Sciatica      NEURO/PSYCH   (+) Anxiety        Physical Exam    Airway    Mallampati score: III  TM Distance: <3 FB  Neck ROM: limited     Dental   No notable dental hx     Cardiovascular  Rhythm: regular, Rate: normal, Cardiovascular exam normal    Pulmonary  Pulmonary exam normal Breath sounds clear to auscultation,     Other Findings  Small  Mouth opening 1-2 finger breaths      Anesthesia Plan  ASA Score- 2     Anesthesia Type- general with ASA Monitors  Additional Monitors:   Airway Plan: ETT  Plan Factors-Exercise tolerance (METS): >4 METS  Chart reviewed  Existing labs reviewed  Patient summary reviewed  Patient is not a current smoker  Obstructive sleep apnea risk education given perioperatively  Induction- intravenous  Postoperative Plan-     Informed Consent- Anesthetic plan and risks discussed with patient  I personally reviewed this patient with the CRNA  Discussed and agreed on the Anesthesia Plan with the CRNA  Karrie Nissen

## 2020-09-18 NOTE — ANESTHESIA POSTPROCEDURE EVALUATION
Post-Op Assessment Note    CV Status:  Stable  Pain Score: 4    Pain management: adequate     Mental Status:  Alert and awake   Hydration Status:  Euvolemic   PONV Controlled:  Controlled   Airway Patency:  Patent      Post Op Vitals Reviewed: Yes      Staff: Anesthesiologist, CRNA         No complications documented      /76 (09/18/20 1315)    Temp (!) 97 1 °F (36 2 °C) (09/18/20 1315)    Pulse 90 (09/18/20 1315)   Resp 20 (09/18/20 1315)    SpO2 100 % (09/18/20 1315)

## 2020-09-18 NOTE — DISCHARGE INSTRUCTIONS
Follow-up with Dr Wilfrid Miller in 2 weeks per point  Regular diet as tolerated  Low intensity activity as tolerated, no heavy lifting, nothing greater than 40 lb for 6 weeks  Dressing may be removed on Sunday  You may shower on Sunday  No baths or swimming  If developed fever, nausea vomiting, worsening pain, redness or drainage from the incision then call the office or go to the ER

## 2020-09-18 NOTE — H&P
Patient seen and examined  Prior history reviewed  Patient examined at bedside and there was no change from prior exam  Will proceed to OR as planned  There were no vitals taken for this visit  Progress Notes          Assessment/Plan:     Pain of upper abdomen  Upper abdominal pain epigastric/right upper quadrant of unknown etiology  It does not fit associated with GERD or gallbladder symptoms  Patient does state it is worse with coughing sneezing movement and is suggest that there is a popping out feeling in that area  There is some point tenderness which I can reproduce on exam   No obvious hernia noted  Furthermore no hernia noted on recent ultrasound of this area nor on previous CT of this area done years ago when she also had similar pain  She states this was also evaluated by previous physician who felt it was a muscle strain years ago  On ultrasound she does have a umbilical and supraumbilical ventral hernia  Unclear if this could be some referred pain  We will continue with laparoscopic repair of these other 2 hernias and will also evaluate the area of concern laparoscopically  If there is a 3rd hernia in this particular spot will also repair this      Umbilical hernia without obstruction and without gangrene  Symptomatic umbilical/ventral hernia  Ultrasound showing both small umbilical and supraumbilical/ventral hernia  These 2 years are symptomatic occasionally and could be contributing to her other point tenderness in more the upper abdomen  Patient is agreeable to pursue laparoscopic repair of these 2 hernias with mesh  I explained the patient that I am hopeful that the repair of these hernias may contribute to decrease in her overall abdominal pain  However I cannot guarantee that repair of these hernias will completely resolve her other point tenderness  The procedure self including all associated risks and benefits were discussed the patient great length    The patient verbalized understand these risks is willing proceed, consent was signed  She will require preoperative EKG and COVID testing prior to surgery          Diagnoses and all orders for this visit:     Pain of upper abdomen     Umbilical hernia without obstruction and without gangrene  -     Ambulatory referral to General Surgery     RUQ pain  -     Ambulatory referral to General Surgery            Subjective:       Patient ID: Thor Rm is a 63 y o  female      68-year-old female with no significant past medical history, presents today in consultation for evaluation of abdominal pain  Patient states she has had this upper abdominal pain slightly to the right for long period of time  She was evaluated by Dr Sherri Johnson years ago who states this was likely a muscle strain  She had a CT scan at that time which was unremarkable  She currently denies any trauma  She states that she does feel a bulge especially if she coughs or with increased activity  This area usually Pops out usually goes back in  The pain is sharp sometimes achy and usually limited and localized to the upper abdomen  She has had laparoscopic surgery for bladder which I believe she had mesh inserted  She recently seen by her PCP ordered an ultrasound  Ultrasound shows 2 small hernias one at the umbilicus and one that is supraumbilical hernia  Patient states she has some mild discomfort in this area but the compared to the area of the upper abdomen  She states that if she does push in the area near the umbilicus she does have some some discomfort  She does see a small bulge just above the umbilicus as well  No nausea vomiting  No fevers or chills  No changes in bowel or bladder habits  Patient is otherwise healthy and denies any current chest pain or worsening shortness of breath         The following portions of the patient's history were reviewed and updated as appropriate:   She  has no past medical history on file    She        Patient Active Problem List     Diagnosis Date Noted    Pain of upper abdomen 29/89/3881    Umbilical hernia without obstruction and without gangrene 06/25/2020    Anxiety 01/21/2020    Chronic bilateral low back pain without sciatica 01/22/2019    Subclinical hypothyroidism 03/27/2018    Vitreous floaters of both eyes 07/03/2014    Gastroesophageal reflux disease without esophagitis 10/29/2012    Hyperglycemia 10/29/2012    Other hyperlipidemia 10/29/2012    Sciatica 10/29/2012      She  has a past surgical history that includes Bladder surgery; 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); Esophagogastroduodenoscopy (2013); and Hysterectomy (2012)  Her family history includes No Known Problems in her father, maternal grandfather, maternal grandmother, mother, paternal grandfather, paternal grandmother, sister, sister, and sister  She  reports that she has quit smoking  She has never used smokeless tobacco  She reports that she drinks alcohol  She reports that she does not use drugs  Current Outpatient Medications   Medication Sig Dispense Refill    amitriptyline (ELAVIL) 25 mg tablet Take 1 tablet (25 mg total) by mouth daily at bedtime as needed for sleep (Patient not taking: Reported on 6/25/2020) 30 tablet 1    DAILY MULTIPLE VITAMINS/IRON TABS Take by mouth        pantoprazole (PROTONIX) 40 mg tablet Take 1 tablet (40 mg total) by mouth daily 90 tablet 3    simvastatin (ZOCOR) 40 mg tablet Take 1 tablet (40 mg total) by mouth every evening 90 tablet 3      No current facility-administered medications for this visit        She has No Known Allergies        Review of Systems       Review of systems completed, all negative except as noted above HPI      Objective:        /78   Pulse 68   Temp 98 °F (36 7 °C)   Ht 5' 8" (1 727 m)   Wt 70 3 kg (155 lb)   BMI 23 57 kg/m²             Physical Exam   Constitutional: She is oriented to person, place, and time   She appears well-nourished  No distress  HENT:   Head: Normocephalic and atraumatic  Eyes: No scleral icterus  Neck: Normal range of motion  Neck supple  No tracheal deviation present  Cardiovascular: Normal rate, regular rhythm and normal heart sounds  Exam reveals no gallop and no friction rub  No murmur heard  Pulmonary/Chest: Effort normal and breath sounds normal  No stridor  No respiratory distress  She has no wheezes  She has no rales  She exhibits no tenderness  Abdominal: Soft  She exhibits no distension and no mass  There is tenderness (Point tenderness in the epigastric stress upper quadrantt, in addition there is tenderness in the periumbilical region  )  There is no rebound and no guarding  A hernia ( palpable umbilical and supraumbilical hernia ) is present  Overall abdomen is flat  There is a palpable or umbilical and supraumbilical defect which were small  There is point tenderness to palpation as described before in the epigastric/right upper quadrant  No evidence of palpable defect at that area  Nothing that pops in or out  Musculoskeletal: Normal range of motion  She exhibits no edema, tenderness or deformity  Lymphadenopathy:     She has no cervical adenopathy  Neurological: She is alert and oriented to person, place, and time  No cranial nerve deficit  Coordination normal    Skin: Skin is warm  No rash noted  She is not diaphoretic  No erythema  No pallor  Psychiatric: She has a normal mood and affect  Her behavior is normal  Judgment and thought content normal    Vitals reviewed

## 2020-09-19 NOTE — OP NOTE
OPERATIVE REPORT  PATIENT NAME: Caren Ornelas    :  1960  MRN: [id number]  Pt Location: [facility] OR ROOM 02    SURGERY DATE: 2020    Surgeon(s) and Role: * Saba Menchaca DO - Primary     * Roopa Huizar PA-C - Assisting    Preop Diagnosis:  Umbilical hernia without obstruction and without gangrene [K42 9]  Ventral hernia without obstruction or gangrene [K43 9]    Post-Op Diagnosis Codes:     * Umbilical hernia without obstruction and without gangrene [K42 9]     * Ventral hernia without obstruction or gangrene [K43 9]    Procedure(s) (LRB):  REPAIR HERNIA VENTRAL & umbilical LAPAROSCOPIC (N/A)    Specimen(s):  * No specimens in log *    Estimated Blood Loss:   10 mL    Drains:  * No LDAs found *    Anesthesia Type:   General    Operative Indications:  Umbilical hernia without obstruction and without gangrene [K42 9]  Ventral hernia without obstruction or gangrene [K43 9]      Operative Findings:  Umbilical and separate supraumbilical hernia  Both containing fat  Fatty tissue reduced  Total area covered by both hernias approximately 4 cm  A 15 2 cm ventral light mesh was then placed laparoscopically and tacked into place  Complications:   None    Procedure and Technique:  Patient brought to operating room placed in supine position operating table  All the regular monitor devices were then connected  The patient underwent general anesthesia with endotracheal intubation without complication  The patient received perioperative antibiotics  The patient received subcutaneous heparin addition to bilateral lower sequential compression devices for DVT prophylaxis  Patient then prepped draped usual sterile fashion  Time-out was performed to verify the correct patient, procedure, position, site  The left upper quadrant approximately 3 fingerbreadths below the ribs at the mid clavicular line, ponce's point, a 5 mm incision was made  The skin was then grasped and elevated    A Veress needle was then inserted and after 2 clicks it was noted that the saline was clearly entering the abdominal cavity  Carbon dioxide gas then connected and the patient's abdomen was then allowed to insufflate  The patient tolerated insufflation well and the abdomen is insufflated to 12-14 mm of mercury  Using a 30 degree scope and a 5 mm port the camera along with the trocar was inserted under direct vision  No acute injury occurred  Three additional 5 mm ports were then placed in the right upper right lower and left lower quadrants  Upon inspection there was a umbilical and supraumbilical ventral hernia  Each measured approximately 1 5 cm on our measurements intraoperatively  Both containing fat  There is significant amount of fat noted along the peritoneum  This was taken down with a combination of blunt and sharp technique  In addition was noted that the falciform ligament was going to be in the way of tacking the mesh and therefore the falciform ligament was then taken down with using the EnSeal device  Once the peritoneum was cleaned off, it was decided to fix the hernia  The total defect measured approximately 4 cm in greatest diameter  It was decided to use a 15 2 cm circular ventral light mesh  The left lower quadrant 5 mm port was removed and exchanged for a 12 mm port  The mesh was then rolled and inserted under direct vision  A suture grasping device was then placed through the skin in the middle of the hernia defect  The suture that was attached to the mesh was then grasped and brought through the skin  The balloon connected the mesh was then insufflated with air allowing for the mesh to lie flush against the anterior abdominal wall  A row of tacks was then placed approximately 1 cm and apart around the outer rim of the mesh  The green balloon was then desufflated removed in 1 piece  An inner row of tacks was then also place with a tacking device    This allowed the mesh to lie flush with the anterior abdominal wall  An Endo-Catch bag was then inserted and the fatty deposits was removed from the peritoneum were then placed in the bag  The left lower quadrant port long the Endo-Catch bag was then removed  Using the suture grasper a 0 Vicryl suture was then placed laparoscopically and the port site was then closed  The remaining ports were then removed under direct vision  The abdomen was then desufflated  The skin of all port sites was approximated using 4 Monocryl  Steri-Strips and dry sterile dressing were then placed  The patient tolerated procedure well was taken to the postanesthesia care unit stable condition  All lap, needle, instrument counts were correct       I was present for the entire procedure, A qualified resident physician was not available and A physician assistant was required during the procedure for retraction tissue handling,dissection and suturing    Patient Disposition:  PACU     SIGNATURE: Vernon Gandara DO  DATE: September 19, 2020  TIME: 3:39 PM

## 2020-09-21 ENCOUNTER — APPOINTMENT (EMERGENCY)
Dept: CT IMAGING | Facility: HOSPITAL | Age: 60
End: 2020-09-21
Payer: COMMERCIAL

## 2020-09-21 ENCOUNTER — HOSPITAL ENCOUNTER (EMERGENCY)
Facility: HOSPITAL | Age: 60
Discharge: HOME | End: 2020-09-21
Attending: EMERGENCY MEDICINE | Admitting: EMERGENCY MEDICINE
Payer: COMMERCIAL

## 2020-09-21 VITALS
DIASTOLIC BLOOD PRESSURE: 82 MMHG | TEMPERATURE: 97.6 F | SYSTOLIC BLOOD PRESSURE: 188 MMHG | BODY MASS INDEX: 24.47 KG/M2 | WEIGHT: 160.94 LBS | OXYGEN SATURATION: 97 % | RESPIRATION RATE: 16 BRPM | HEART RATE: 79 BPM

## 2020-09-21 DIAGNOSIS — R10.84 GENERALIZED ABDOMINAL PAIN: ICD-10-CM

## 2020-09-21 DIAGNOSIS — K59.03 DRUG-INDUCED CONSTIPATION: Primary | ICD-10-CM

## 2020-09-21 LAB
ALBUMIN SERPL BCP-MCNC: 3.8 G/DL (ref 3.5–5)
ALP SERPL-CCNC: 50 U/L (ref 46–116)
ALT SERPL W P-5'-P-CCNC: 25 U/L (ref 12–78)
ANION GAP SERPL CALCULATED.3IONS-SCNC: 5 MMOL/L (ref 4–13)
AST SERPL W P-5'-P-CCNC: 17 U/L (ref 5–45)
BACTERIA UR QL AUTO: NORMAL /HPF
BASOPHILS # BLD AUTO: 0.05 THOUSANDS/ΜL (ref 0–0.1)
BASOPHILS NFR BLD AUTO: 1 % (ref 0–1)
BILIRUB SERPL-MCNC: 0.4 MG/DL (ref 0.2–1)
BILIRUB UR QL STRIP: NEGATIVE
BUN SERPL-MCNC: 15 MG/DL (ref 5–25)
CALCIUM SERPL-MCNC: 9.5 MG/DL (ref 8.3–10.1)
CHLORIDE SERPL-SCNC: 99 MMOL/L (ref 100–108)
CLARITY UR: CLEAR
CO2 SERPL-SCNC: 30 MMOL/L (ref 21–32)
COLOR UR: YELLOW
CREAT SERPL-MCNC: 0.79 MG/DL (ref 0.6–1.3)
EOSINOPHIL # BLD AUTO: 0.1 THOUSAND/ΜL (ref 0–0.61)
EOSINOPHIL NFR BLD AUTO: 1 % (ref 0–6)
ERYTHROCYTE [DISTWIDTH] IN BLOOD BY AUTOMATED COUNT: 11.9 % (ref 11.6–15.1)
GFR SERPL CREATININE-BSD FRML MDRD: 81 ML/MIN/1.73SQ M
GLUCOSE SERPL-MCNC: 204 MG/DL (ref 65–140)
GLUCOSE UR STRIP-MCNC: NEGATIVE MG/DL
HCT VFR BLD AUTO: 40.2 % (ref 34.8–46.1)
HGB BLD-MCNC: 13.7 G/DL (ref 11.5–15.4)
HGB UR QL STRIP.AUTO: ABNORMAL
IMM GRANULOCYTES # BLD AUTO: 0.09 THOUSAND/UL (ref 0–0.2)
IMM GRANULOCYTES NFR BLD AUTO: 1 % (ref 0–2)
KETONES UR STRIP-MCNC: NEGATIVE MG/DL
LEUKOCYTE ESTERASE UR QL STRIP: NEGATIVE
LIPASE SERPL-CCNC: 80 U/L (ref 73–393)
LYMPHOCYTES # BLD AUTO: 2.94 THOUSANDS/ΜL (ref 0.6–4.47)
LYMPHOCYTES NFR BLD AUTO: 31 % (ref 14–44)
MCH RBC QN AUTO: 32.9 PG (ref 26.8–34.3)
MCHC RBC AUTO-ENTMCNC: 34.1 G/DL (ref 31.4–37.4)
MCV RBC AUTO: 96 FL (ref 82–98)
MONOCYTES # BLD AUTO: 0.63 THOUSAND/ΜL (ref 0.17–1.22)
MONOCYTES NFR BLD AUTO: 7 % (ref 4–12)
NEUTROPHILS # BLD AUTO: 5.68 THOUSANDS/ΜL (ref 1.85–7.62)
NEUTS SEG NFR BLD AUTO: 59 % (ref 43–75)
NITRITE UR QL STRIP: NEGATIVE
NON-SQ EPI CELLS URNS QL MICRO: NORMAL /HPF
NRBC BLD AUTO-RTO: 0 /100 WBCS
PH UR STRIP.AUTO: 6.5 [PH]
PLATELET # BLD AUTO: 276 THOUSANDS/UL (ref 149–390)
PMV BLD AUTO: 9.6 FL (ref 8.9–12.7)
POTASSIUM SERPL-SCNC: 3.5 MMOL/L (ref 3.5–5.3)
PROT SERPL-MCNC: 7.8 G/DL (ref 6.4–8.2)
PROT UR STRIP-MCNC: NEGATIVE MG/DL
RBC # BLD AUTO: 4.17 MILLION/UL (ref 3.81–5.12)
RBC #/AREA URNS AUTO: NORMAL /HPF
SODIUM SERPL-SCNC: 134 MMOL/L (ref 136–145)
SP GR UR STRIP.AUTO: <=1.005 (ref 1–1.03)
UROBILINOGEN UR QL STRIP.AUTO: 1 E.U./DL
WBC # BLD AUTO: 9.49 THOUSAND/UL (ref 4.31–10.16)
WBC #/AREA URNS AUTO: NORMAL /HPF

## 2020-09-21 PROCEDURE — 85025 COMPLETE CBC W/AUTO DIFF WBC: CPT | Performed by: EMERGENCY MEDICINE

## 2020-09-21 PROCEDURE — 83690 ASSAY OF LIPASE: CPT | Performed by: EMERGENCY MEDICINE

## 2020-09-21 PROCEDURE — 99284 EMERGENCY DEPT VISIT MOD MDM: CPT

## 2020-09-21 PROCEDURE — 80053 COMPREHEN METABOLIC PANEL: CPT | Performed by: EMERGENCY MEDICINE

## 2020-09-21 PROCEDURE — 74177 CT ABD & PELVIS W/CONTRAST: CPT

## 2020-09-21 PROCEDURE — 81001 URINALYSIS AUTO W/SCOPE: CPT | Performed by: EMERGENCY MEDICINE

## 2020-09-21 PROCEDURE — 96374 THER/PROPH/DIAG INJ IV PUSH: CPT

## 2020-09-21 PROCEDURE — 99284 EMERGENCY DEPT VISIT MOD MDM: CPT | Performed by: EMERGENCY MEDICINE

## 2020-09-21 PROCEDURE — G1004 CDSM NDSC: HCPCS

## 2020-09-21 RX ORDER — MORPHINE SULFATE 10 MG/ML
6 INJECTION, SOLUTION INTRAMUSCULAR; INTRAVENOUS ONCE
Status: COMPLETED | OUTPATIENT
Start: 2020-09-21 | End: 2020-09-21

## 2020-09-21 RX ORDER — SODIUM PHOSPHATE, DIBASIC AND SODIUM PHOSPHATE, MONOBASIC 7; 19 G/133ML; G/133ML
1 ENEMA RECTAL ONCE
Status: DISCONTINUED | OUTPATIENT
Start: 2020-09-21 | End: 2020-09-21 | Stop reason: HOSPADM

## 2020-09-21 RX ADMIN — MORPHINE SULFATE 6 MG: 10 INJECTION INTRAVENOUS at 19:07

## 2020-09-21 RX ADMIN — IOHEXOL 100 ML: 350 INJECTION, SOLUTION INTRAVENOUS at 19:37

## 2020-09-22 NOTE — DISCHARGE INSTRUCTIONS
BOWEL REGIMEN    MiraLAX (Polyethylene)  ----------------  3 times a day for 3 days, then once daily  Colace (docusate sodium) 100 mg ----1 pill twice a day  Citrucel (methylcellulose) ---------------- As directed on the bottle  Enemas or suppositories  ----------------ONLY As Directed on the package  Drink plenty of water  If you have been taking   narcotic pain medication you should add  Senokot: --------------------------------------2 tablets at bedtime     Incidental findings: small adrenal nodule; small calcifications in spleen  No action needed - just for you and your doctor to be aware

## 2020-09-22 NOTE — ED PROVIDER NOTES
History  Chief Complaint   Patient presents with    Abdominal Pain     pt had hernia repair surgery here on Friday  States she has no had a bowel movement for the past three days  pt c/o lower abd pain and states "it feels like i have to have a bowel movement"     Patient: Karen Ohara  63 y o  female  YOB: 1960  MRN: [id number]  PCP: Fraser Dakin, DO  Date of evaluation: 9/21/2020    (N B   [address] may have been used in the preparation of this document  Occasional wrong word or "sound-alike" substitutions may have occurred due to the inherent limitations of voice recognition software  Interpretation should be guided by context )    History provided by:  Patient  Abdominal Pain   Pain location:  Generalized  Pain quality: cramping    Pain radiates to:  Does not radiate  Pain severity:  Severe  Onset quality:  Gradual  Duration: 3  Chronicity:  New  Context comment:  Umbilical hernia repair 3d ago; taking oxycodone  Relieved by:  Nothing  Worsened by:  Nothing  Ineffective treatments: Stool softener, laxative, Metamucil  Associated symptoms: constipation    Associated symptoms: no anorexia, no belching, no chest pain, no chills, no cough, no diarrhea, no dysuria, no fever, no hematuria, no nausea, no shortness of breath, no vaginal bleeding, no vaginal discharge and no vomiting        Prior to Admission Medications   Prescriptions Last Dose Informant Patient Reported? Taking?    DAILY MULTIPLE VITAMINS/IRON TABS 9/21/2020 at Unknown time  Yes Yes   Sig: Take by mouth   Turmeric 400 MG CAPS 9/21/2020 at Unknown time  Yes Yes   Sig: Take 800 mg by mouth 2 (two) times a day   oxyCODONE-acetaminophen (PERCOCET) 5-325 mg per tablet 9/21/2020 at Unknown time  No Yes   Sig: Take 1 tablet by mouth every 4 (four) hours as needed for moderate pain or severe painMax Daily Amount: 6 tablets   pantoprazole (PROTONIX) 40 mg tablet 9/21/2020 at Unknown time  No Yes   Sig: Take 1 tablet (40 mg total) by mouth daily   simvastatin (ZOCOR) 40 mg tablet 2020 at Unknown time  No Yes   Sig: Take 1 tablet (40 mg total) by mouth every evening      Facility-Administered Medications: None       Past Medical History:   Diagnosis Date    GERD (gastroesophageal reflux disease)     Hyperlipidemia        Past Surgical History:   Procedure Laterality Date    BLADDER SURGERY      ESOPHAGOGASTRODUODENOSCOPY      Diagnostic    HYSTERECTOMY  2012    CA LAP, VENTRAL HERNIA REPAIR,REDUCIBLE N/A 2020    Procedure: REPAIR HERNIA VENTRAL & umbilical LAPAROSCOPIC;  Surgeon: Alejo Jorgensen DO;  Location: [facility];  Service: General    TETRALOGY OF FALLOT REPAIR W/ PULMONARY ATRESIA REPAIR      Complete       Family History   Problem Relation Age of Onset    No Known Problems Mother     No Known Problems Father     No Known Problems Sister     No Known Problems Maternal Grandmother     No Known Problems Maternal Grandfather     No Known Problems Paternal Grandmother     No Known Problems Paternal Grandfather     No Known Problems Sister     No Known Problems Sister      I have reviewed and agree with the history as documented  E-Cigarette/Vaping    E-Cigarette Use Never User      E-Cigarette/Vaping Substances    Nicotine No     THC No     CBD No     Flavoring No      Social History     Tobacco Use    Smoking status: Former Smoker     Last attempt to quit: 2019     Years since quittin 3    Smokeless tobacco: Never Used   Substance Use Topics    Alcohol use: Yes     Comment: rarely    Drug use: No       Review of Systems   Constitutional: Negative for chills and fever  HENT: Negative for hearing loss, trouble swallowing and voice change  Eyes: Negative for pain, redness and visual disturbance  Respiratory: Negative for cough and shortness of breath  Cardiovascular: Negative for chest pain and palpitations     Gastrointestinal: Positive for abdominal pain and constipation  Negative for anorexia, diarrhea, nausea and vomiting  Genitourinary: Negative for dysuria, hematuria, vaginal bleeding and vaginal discharge  Musculoskeletal: Negative for back pain, gait problem and neck pain  Skin: Negative for color change and rash  Neurological: Negative for weakness and light-headedness  Psychiatric/Behavioral: Negative for confusion and decreased concentration  The patient is not nervous/anxious  All other systems reviewed and are negative   s    Physical Exam  Physical Exam  Vitals signs and nursing note reviewed  Constitutional:       Appearance: She is well-developed  She is not toxic-appearing  HENT:      Head: Normocephalic and atraumatic  Eyes:      Extraocular Movements: Extraocular movements intact  Pupils: Pupils are equal, round, and reactive to light  Neck:      Musculoskeletal: Neck supple  Trachea: Phonation normal    Cardiovascular:      Rate and Rhythm: Normal rate and regular rhythm  Pulmonary:      Effort: Pulmonary effort is normal       Breath sounds: Normal breath sounds  Abdominal:      General: Bowel sounds are decreased  There is distension  Palpations: Abdomen is soft  Tenderness: There is generalized abdominal tenderness  There is no guarding or rebound  Skin:     General: Skin is warm and dry  Capillary Refill: Capillary refill takes less than 2 seconds  Comments: Incision sites without sign of infection  Slight bruising surrounding them c/w procedure   Neurological:      Mental Status: She is alert and oriented to person, place, and time     Psychiatric:         Speech: Speech normal          Behavior: Behavior normal          Vital Signs  ED Triage Vitals   Temperature Pulse Respirations Blood Pressure SpO2   09/21/20 1826 09/21/20 1829 09/21/20 1826 09/21/20 1829 09/21/20 1829   97 6 °F (36 4 °C) 80 20 (!) 207/84 97 %      Temp Source Heart Rate Source Patient Position - Orthostatic VS BP Location FiO2 (%)   09/21/20 1826 09/21/20 1826 09/21/20 1826 09/21/20 1826 --   Temporal Monitor Lying Left arm       Pain Score       09/21/20 1826       Worst Possible Pain           Vitals:    09/21/20 1829 09/21/20 1845 09/21/20 1945 09/21/20 2030   BP: (!) 207/84 (!) 198/86 (!) 214/88 (!) 188/82   Pulse: 80 73 77 79   Patient Position - Orthostatic VS: Lying Lying Lying Lying         Visual Acuity      ED Medications  Medications   sodium phosphate-biphosphate (FLEET) enema 1 enema (has no administration in time range)   morphine (PF) 10 mg/mL injection 6 mg (6 mg Intravenous Given 9/21/20 1907)   iohexol (OMNIPAQUE) 350 MG/ML injection (MULTI-DOSE) 100 mL (100 mL Intravenous Given 9/21/20 1937)       Diagnostic Studies  Results Reviewed     Procedure Component Value Units Date/Time    Urine Microscopic [[id number]]  (Normal) Collected:  09/21/20 2016    Lab Status:  Final result Specimen:  Urine, Clean Catch Updated:  09/21/20 2033     RBC, UA 2-4 /hpf      WBC, UA None Seen /hpf      Epithelial Cells Occasional /hpf      Bacteria, UA Occasional /hpf     UA w Reflex to Microscopic w Reflex to Culture [[id number]]  (Abnormal) Collected:  09/21/20 2016    Lab Status:  Final result Specimen:  Urine, Clean Catch Updated:  09/21/20 2022     Color, UA Yellow     Clarity, UA Clear     Specific Gravity, UA <=1 005     pH, UA 6 5     Leukocytes, UA Negative     Nitrite, UA Negative     Protein, UA Negative mg/dl      Glucose, UA Negative mg/dl      Ketones, UA Negative mg/dl      Urobilinogen, UA 1 0 E U /dl      Bilirubin, UA Negative     Blood, UA Small    Comprehensive metabolic panel [[id number]]  (Abnormal) Collected:  09/21/20 1838    Lab Status:  Final result Specimen:  Blood from Arm, Left Updated:  09/21/20 1858     Sodium 134 mmol/L      Potassium 3 5 mmol/L      Chloride 99 mmol/L      CO2 30 mmol/L      ANION GAP 5 mmol/L      BUN 15 mg/dL      Creatinine 0 79 mg/dL      Glucose 204 mg/dL      Calcium 9 5 mg/dL      AST 17 U/L      ALT 25 U/L      Alkaline Phosphatase 50 U/L      Total Protein 7 8 g/dL      Albumin 3 8 g/dL      Total Bilirubin 0 40 mg/dL      eGFR 81 ml/min/1 73sq m     Narrative:       [place] guidelines for Chronic Kidney Disease (CKD):     Stage 1 with normal or high GFR (GFR > 90 mL/min/1 73 square meters)    Stage 2 Mild CKD (GFR = 60-89 mL/min/1 73 square meters)    Stage 3A Moderate CKD (GFR = 45-59 mL/min/1 73 square meters)    Stage 3B Moderate CKD (GFR = 30-44 mL/min/1 73 square meters)    Stage 4 Severe CKD (GFR = 15-29 mL/min/1 73 square meters)    Stage 5 End Stage CKD (GFR <15 mL/min/1 73 square meters)  Note: GFR calculation is accurate only with a steady state creatinine    Lipase [[id number]]  (Normal) Collected:  09/21/20 1838    Lab Status:  Final result Specimen:  Blood from Arm, Left Updated:  09/21/20 1853     Lipase 80 u/L     CBC and differential [[id number]] Collected:  09/21/20 1838    Lab Status:  Final result Specimen:  Blood from Arm, Left Updated:  09/21/20 1842     WBC 9 49 Thousand/uL      RBC 4 17 Million/uL      Hemoglobin 13 7 g/dL      Hematocrit 40 2 %      MCV 96 fL      MCH 32 9 pg      MCHC 34 1 g/dL      RDW 11 9 %      MPV 9 6 fL      Platelets 192 Thousands/uL      nRBC 0 /100 WBCs      Neutrophils Relative 59 %      Immat GRANS % 1 %      Lymphocytes Relative 31 %      Monocytes Relative 7 %      Eosinophils Relative 1 %      Basophils Relative 1 %      Neutrophils Absolute 5 68 Thousands/µL      Immature Grans Absolute 0 09 Thousand/uL      Lymphocytes Absolute 2 94 Thousands/µL      Monocytes Absolute 0 63 Thousand/µL      Eosinophils Absolute 0 10 Thousand/µL      Basophils Absolute 0 05 Thousands/µL                  CT abdomen pelvis with contrast   Final Result by Ho Virgen MD (09/21 2014)      Moderate volume of formed stool throughout the colon suggestive of constipation        No evidence of mechanical bowel obstruction as questioned  Additional findings as described  Workstation performed: [id number]                    Procedures  Procedures         ED Course  ED Course as of Sep 21 2138   Mon Sep 21, 2020   2004 Pain is under control at present  No nausea  2023 IMPRESSION:     Moderate volume of formed stool throughout the colon suggestive of constipation      No evidence of mechanical bowel obstruction as questioned  CT abdomen pelvis with contrast   2025 Pt informed of results including incidental findings of adrenal nodule and calcifications in spleen  SBIRT 20yo+      Most Recent Value   SBIRT (24 yo +)   In order to provide better care to our patients, we are screening all of our patients for alcohol and drug use  Would it be okay to ask you these screening questions? Yes Filed at: 09/21/2020 1836   Initial Alcohol Screen: US AUDIT-C    1  How often do you have a drink containing alcohol? 1 Filed at: 09/21/2020 1836   2  How many drinks containing alcohol do you have on a typical day you are drinking? 0 Filed at: 09/21/2020 1836   3a  Male UNDER 65: How often do you have five or more drinks on one occasion? 0 Filed at: 09/21/2020 1836   3b  FEMALE Any Age, or MALE 65+: How often do you have 4 or more drinks on one occassion? 0 Filed at: 09/21/2020 1836   Audit-C Score  1 Filed at: 09/21/2020 1836   ZAY: How many times in the past year have you    Used an illegal drug or used a prescription medication for non-medical reasons?   Never Filed at: 09/21/2020 1836                  MDM  Number of Diagnoses or Management Options  Drug-induced constipation:   Generalized abdominal pain: new and requires workup     Amount and/or Complexity of Data Reviewed  Tests in the radiology section of CPT®: ordered and reviewed  Tests in the medicine section of CPT®: reviewed and ordered    Risk of Complications, Morbidity, and/or Mortality  Presenting problems: high  Diagnostic procedures: high    Patient Progress  Patient progress: improved      Disposition  Final diagnoses:   Drug-induced constipation   Generalized abdominal pain     Time reflects when diagnosis was documented in both MDM as applicable and the Disposition within this note     Time User Action Codes Description Comment    9/21/2020  8:21 PM Shannon Hay Add [K59 03] Drug-induced constipation     9/21/2020  8:21 PM Miles Sangeeta NICHOLE Add [R10 84] Generalized abdominal pain       ED Disposition     ED Disposition Condition Date/Time Comment    Discharge Stable Mon Sep 21, 2020  8:21 PM [address] [place] [address] discharge to home/self care  Follow-up Information    None         Discharge Medication List as of 9/21/2020  8:57 PM      CONTINUE these medications which have NOT CHANGED    Details   DAILY MULTIPLE VITAMINS/IRON TABS Take by mouth, Historical Med      oxyCODONE-acetaminophen (PERCOCET) 5-325 mg per tablet Take 1 tablet by mouth every 4 (four) hours as needed for moderate pain or severe painMax Daily Amount: 6 tablets, Starting Fri 9/18/2020, Normal      pantoprazole (PROTONIX) 40 mg tablet Take 1 tablet (40 mg total) by mouth daily, Starting Tue 1/21/2020, Normal      simvastatin (ZOCOR) 40 mg tablet Take 1 tablet (40 mg total) by mouth every evening, Starting Tue 1/21/2020, Normal      Turmeric 400 MG CAPS Take 800 mg by mouth 2 (two) times a day, Historical Med           No discharge procedures on file      PDMP Review     None          ED Provider  Electronically Signed by           Gonzales Ulloa MD  09/21/20 5335

## 2020-10-05 ENCOUNTER — OFFICE VISIT (OUTPATIENT)
Dept: SURGERY | Facility: CLINIC | Age: 60
End: 2020-10-05

## 2020-10-05 ENCOUNTER — TELEPHONE (OUTPATIENT)
Dept: GASTROENTEROLOGY | Facility: CLINIC | Age: 60
End: 2020-10-05

## 2020-10-05 VITALS
SYSTOLIC BLOOD PRESSURE: 144 MMHG | HEART RATE: 80 BPM | TEMPERATURE: 98.3 F | BODY MASS INDEX: 24.25 KG/M2 | WEIGHT: 160 LBS | DIASTOLIC BLOOD PRESSURE: 79 MMHG | HEIGHT: 68 IN

## 2020-10-05 DIAGNOSIS — K42.9 UMBILICAL HERNIA WITHOUT OBSTRUCTION AND WITHOUT GANGRENE: Primary | ICD-10-CM

## 2020-10-05 PROCEDURE — 99024 POSTOP FOLLOW-UP VISIT: CPT | Performed by: SURGERY

## 2020-10-26 ENCOUNTER — OFFICE VISIT (OUTPATIENT)
Dept: SURGERY | Facility: CLINIC | Age: 60
End: 2020-10-26

## 2020-10-26 VITALS
TEMPERATURE: 98.5 F | WEIGHT: 160 LBS | SYSTOLIC BLOOD PRESSURE: 144 MMHG | HEIGHT: 68 IN | HEART RATE: 85 BPM | DIASTOLIC BLOOD PRESSURE: 88 MMHG | BODY MASS INDEX: 24.25 KG/M2

## 2020-10-26 DIAGNOSIS — K42.9 UMBILICAL HERNIA WITHOUT OBSTRUCTION AND WITHOUT GANGRENE: Primary | ICD-10-CM

## 2020-10-26 PROCEDURE — 99024 POSTOP FOLLOW-UP VISIT: CPT | Performed by: SURGERY

## 2021-03-09 PROBLEM — H69.83 DYSFUNCTION OF BOTH EUSTACHIAN TUBES: Status: ACTIVE | Noted: 2021-03-09

## 2021-03-09 PROBLEM — H93.13 TINNITUS AURIUM, BILATERAL: Status: ACTIVE | Noted: 2021-03-09

## 2021-03-09 PROBLEM — H69.93 DYSFUNCTION OF BOTH EUSTACHIAN TUBES: Status: ACTIVE | Noted: 2021-03-09

## 2021-03-09 PROBLEM — H81.11 BENIGN POSITIONAL VERTIGO, RIGHT: Status: ACTIVE | Noted: 2021-03-09

## 2021-04-06 ENCOUNTER — OFFICE VISIT (OUTPATIENT)
Dept: FAMILY MEDICINE CLINIC | Facility: CLINIC | Age: 61
End: 2021-04-06
Payer: COMMERCIAL

## 2021-04-06 ENCOUNTER — APPOINTMENT (OUTPATIENT)
Dept: LAB | Facility: MEDICAL CENTER | Age: 61
End: 2021-04-06
Payer: COMMERCIAL

## 2021-04-06 VITALS
TEMPERATURE: 98.6 F | WEIGHT: 152.4 LBS | SYSTOLIC BLOOD PRESSURE: 138 MMHG | BODY MASS INDEX: 23.1 KG/M2 | HEIGHT: 68 IN | DIASTOLIC BLOOD PRESSURE: 82 MMHG

## 2021-04-06 DIAGNOSIS — E78.49 OTHER HYPERLIPIDEMIA: Primary | ICD-10-CM

## 2021-04-06 DIAGNOSIS — K21.9 GASTROESOPHAGEAL REFLUX DISEASE WITHOUT ESOPHAGITIS: ICD-10-CM

## 2021-04-06 DIAGNOSIS — E03.8 SUBCLINICAL HYPOTHYROIDISM: ICD-10-CM

## 2021-04-06 DIAGNOSIS — R73.9 HYPERGLYCEMIA: ICD-10-CM

## 2021-04-06 DIAGNOSIS — Z12.31 ENCOUNTER FOR SCREENING MAMMOGRAM FOR MALIGNANT NEOPLASM OF BREAST: ICD-10-CM

## 2021-04-06 LAB
ALBUMIN SERPL BCP-MCNC: 4.2 G/DL (ref 3.5–5)
ALP SERPL-CCNC: 47 U/L (ref 46–116)
ALT SERPL W P-5'-P-CCNC: 28 U/L (ref 12–78)
ANION GAP SERPL CALCULATED.3IONS-SCNC: 6 MMOL/L (ref 4–13)
AST SERPL W P-5'-P-CCNC: 18 U/L (ref 5–45)
BASOPHILS # BLD AUTO: 0.05 THOUSANDS/ΜL (ref 0–0.1)
BASOPHILS NFR BLD AUTO: 1 % (ref 0–1)
BILIRUB SERPL-MCNC: 0.45 MG/DL (ref 0.2–1)
BUN SERPL-MCNC: 16 MG/DL (ref 5–25)
CALCIUM SERPL-MCNC: 9.4 MG/DL (ref 8.3–10.1)
CHLORIDE SERPL-SCNC: 104 MMOL/L (ref 100–108)
CHOLEST SERPL-MCNC: 193 MG/DL (ref 50–200)
CO2 SERPL-SCNC: 28 MMOL/L (ref 21–32)
CREAT SERPL-MCNC: 0.62 MG/DL (ref 0.6–1.3)
EOSINOPHIL # BLD AUTO: 0.12 THOUSAND/ΜL (ref 0–0.61)
EOSINOPHIL NFR BLD AUTO: 2 % (ref 0–6)
ERYTHROCYTE [DISTWIDTH] IN BLOOD BY AUTOMATED COUNT: 12.3 % (ref 11.6–15.1)
EST. AVERAGE GLUCOSE BLD GHB EST-MCNC: 126 MG/DL
GFR SERPL CREATININE-BSD FRML MDRD: 97 ML/MIN/1.73SQ M
GLUCOSE P FAST SERPL-MCNC: 107 MG/DL (ref 65–99)
HBA1C MFR BLD: 6 %
HCT VFR BLD AUTO: 42.5 % (ref 34.8–46.1)
HDLC SERPL-MCNC: 70 MG/DL
HGB BLD-MCNC: 14 G/DL (ref 11.5–15.4)
IMM GRANULOCYTES # BLD AUTO: 0.03 THOUSAND/UL (ref 0–0.2)
IMM GRANULOCYTES NFR BLD AUTO: 1 % (ref 0–2)
LDLC SERPL CALC-MCNC: 110 MG/DL (ref 0–100)
LYMPHOCYTES # BLD AUTO: 2.21 THOUSANDS/ΜL (ref 0.6–4.47)
LYMPHOCYTES NFR BLD AUTO: 35 % (ref 14–44)
MCH RBC QN AUTO: 32.3 PG (ref 26.8–34.3)
MCHC RBC AUTO-ENTMCNC: 32.9 G/DL (ref 31.4–37.4)
MCV RBC AUTO: 98 FL (ref 82–98)
MONOCYTES # BLD AUTO: 0.4 THOUSAND/ΜL (ref 0.17–1.22)
MONOCYTES NFR BLD AUTO: 6 % (ref 4–12)
NEUTROPHILS # BLD AUTO: 3.51 THOUSANDS/ΜL (ref 1.85–7.62)
NEUTS SEG NFR BLD AUTO: 55 % (ref 43–75)
NRBC BLD AUTO-RTO: 0 /100 WBCS
PLATELET # BLD AUTO: 280 THOUSANDS/UL (ref 149–390)
PMV BLD AUTO: 10.3 FL (ref 8.9–12.7)
POTASSIUM SERPL-SCNC: 4.3 MMOL/L (ref 3.5–5.3)
PROT SERPL-MCNC: 7.9 G/DL (ref 6.4–8.2)
RBC # BLD AUTO: 4.34 MILLION/UL (ref 3.81–5.12)
SODIUM SERPL-SCNC: 138 MMOL/L (ref 136–145)
TRIGL SERPL-MCNC: 63 MG/DL
TSH SERPL DL<=0.05 MIU/L-ACNC: 3.43 UIU/ML (ref 0.36–3.74)
WBC # BLD AUTO: 6.32 THOUSAND/UL (ref 4.31–10.16)

## 2021-04-06 PROCEDURE — 80053 COMPREHEN METABOLIC PANEL: CPT | Performed by: FAMILY MEDICINE

## 2021-04-06 PROCEDURE — 3725F SCREEN DEPRESSION PERFORMED: CPT | Performed by: FAMILY MEDICINE

## 2021-04-06 PROCEDURE — 3008F BODY MASS INDEX DOCD: CPT | Performed by: FAMILY MEDICINE

## 2021-04-06 PROCEDURE — 83036 HEMOGLOBIN GLYCOSYLATED A1C: CPT | Performed by: FAMILY MEDICINE

## 2021-04-06 PROCEDURE — 80061 LIPID PANEL: CPT | Performed by: FAMILY MEDICINE

## 2021-04-06 PROCEDURE — 1036F TOBACCO NON-USER: CPT | Performed by: FAMILY MEDICINE

## 2021-04-06 PROCEDURE — 36415 COLL VENOUS BLD VENIPUNCTURE: CPT | Performed by: FAMILY MEDICINE

## 2021-04-06 PROCEDURE — 99214 OFFICE O/P EST MOD 30 MIN: CPT | Performed by: FAMILY MEDICINE

## 2021-04-06 PROCEDURE — 84443 ASSAY THYROID STIM HORMONE: CPT | Performed by: FAMILY MEDICINE

## 2021-04-06 PROCEDURE — 85025 COMPLETE CBC W/AUTO DIFF WBC: CPT | Performed by: FAMILY MEDICINE

## 2021-04-06 RX ORDER — SIMVASTATIN 40 MG
40 TABLET ORAL EVERY EVENING
Qty: 90 TABLET | Refills: 3 | Status: SHIPPED | OUTPATIENT
Start: 2021-04-06 | End: 2021-09-30 | Stop reason: SDUPTHER

## 2021-04-06 RX ORDER — PANTOPRAZOLE SODIUM 40 MG/1
40 TABLET, DELAYED RELEASE ORAL DAILY
Qty: 90 TABLET | Refills: 3 | Status: SHIPPED | OUTPATIENT
Start: 2021-04-06 | End: 2021-09-30 | Stop reason: SDUPTHER

## 2021-04-06 NOTE — PROGRESS NOTES
Assessment/Plan:   blood work ordered  Continue same medications  She will monitor her blood pressure  We will see her back in 6 months or p r n     Problem List Items Addressed This Visit        Digestive    Gastroesophageal reflux disease without esophagitis    Relevant Medications    pantoprazole (PROTONIX) 40 mg tablet    Other Relevant Orders    CBC and differential       Endocrine    Subclinical hypothyroidism    Relevant Orders    CBC and differential    TSH, 3rd generation with Free T4 reflex       Other    Hyperglycemia    Relevant Orders    CBC and differential    Hemoglobin A1C    Other hyperlipidemia - Primary    Relevant Medications    simvastatin (ZOCOR) 40 mg tablet    Other Relevant Orders    CBC and differential    Comprehensive metabolic panel    Lipid Panel with Direct LDL reflex      Other Visit Diagnoses     Encounter for screening mammogram for malignant neoplasm of breast        Relevant Orders    Mammo screening bilateral w 3d & cad           Diagnoses and all orders for this visit:    Other hyperlipidemia  -     CBC and differential  -     Comprehensive metabolic panel  -     Lipid Panel with Direct LDL reflex  -     simvastatin (ZOCOR) 40 mg tablet; Take 1 tablet (40 mg total) by mouth every evening    Gastroesophageal reflux disease without esophagitis  -     CBC and differential  -     pantoprazole (PROTONIX) 40 mg tablet; Take 1 tablet (40 mg total) by mouth daily    Subclinical hypothyroidism  -     CBC and differential  -     TSH, 3rd generation with Free T4 reflex    Encounter for screening mammogram for malignant neoplasm of breast  -     Mammo screening bilateral w 3d & cad; Future    Hyperglycemia  -     CBC and differential  -     Hemoglobin A1C        No problem-specific Assessment & Plan notes found for this encounter  Subjective:      Patient ID: Heraclio Saucedo is a 57 y o  female  Patient here today follow-up  Patient denies any chest pain or shortness breath  She is due for blood work  Protonix still helps her with her stomach  Heartburn  She complains of heartburn  This is a chronic problem  The problem occurs rarely  The heartburn is located in the substernum  The symptoms are aggravated by certain foods  There are no known risk factors  She has tried a PPI for the symptoms  The treatment provided significant relief  Hyperlipidemia  This is a chronic problem  The problem is controlled  There are no known factors aggravating her hyperlipidemia  Current antihyperlipidemic treatment includes statins  The current treatment provides significant improvement of lipids  There are no compliance problems  Risk factors for coronary artery disease include dyslipidemia  The following portions of the patient's history were reviewed and updated as appropriate:   She has a past medical history of GERD (gastroesophageal reflux disease) and Hyperlipidemia ,  does not have any pertinent problems on file  ,   has a past surgical history that includes Bladder surgery; 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); Esophagogastroduodenoscopy (2013); Hysterectomy (2012); and pr lap, ventral hernia repair,reducible (N/A, 9/18/2020)  ,  family history includes No Known Problems in her father, maternal grandfather, maternal grandmother, mother, paternal grandfather, paternal grandmother, sister, sister, and sister  ,   reports that she quit smoking about 22 months ago  She has never used smokeless tobacco  She reports current alcohol use  She reports that she does not use drugs  ,  has No Known Allergies     Current Outpatient Medications   Medication Sig Dispense Refill    DAILY MULTIPLE VITAMINS/IRON TABS Take by mouth      fluticasone (FLONASE) 50 mcg/act nasal spray 2 sprays into each nostril daily 1 Bottle 11    pantoprazole (PROTONIX) 40 mg tablet Take 1 tablet (40 mg total) by mouth daily 90 tablet 3    simvastatin (ZOCOR) 40 mg tablet Take 1 tablet (40 mg total) by mouth every evening 90 tablet 3    Turmeric 400 MG CAPS Take 800 mg by mouth 2 (two) times a day       No current facility-administered medications for this visit  Review of Systems   Constitutional: Negative  Respiratory: Negative  Cardiovascular: Negative  Gastrointestinal: Positive for heartburn  Genitourinary: Negative  Objective:  Vitals:    04/06/21 0728 04/06/21 0746   BP: 148/74 138/82   BP Location:  Left arm   Patient Position:  Sitting   Cuff Size:  Standard   Temp: 98 6 °F (37 °C)    Weight: 69 1 kg (152 lb 6 4 oz)    Height: 5' 8" (1 727 m)      Body mass index is 23 17 kg/m²  Physical Exam  Vitals signs reviewed  Constitutional:       General: She is not in acute distress  Appearance: Normal appearance  She is well-developed  She is not diaphoretic  HENT:      Head: Normocephalic and atraumatic  Eyes:      Conjunctiva/sclera: Conjunctivae normal    Cardiovascular:      Rate and Rhythm: Normal rate and regular rhythm  Heart sounds: Normal heart sounds  No murmur  No friction rub  No gallop  Pulmonary:      Effort: Pulmonary effort is normal  No respiratory distress  Breath sounds: Normal breath sounds  No wheezing, rhonchi or rales  Musculoskeletal:      Right lower leg: No edema  Left lower leg: No edema  Neurological:      General: No focal deficit present  Mental Status: She is alert and oriented to person, place, and time  Psychiatric:         Mood and Affect: Mood normal          Behavior: Behavior normal          Thought Content:  Thought content normal          Judgment: Judgment normal

## 2021-05-04 ENCOUNTER — HOSPITAL ENCOUNTER (OUTPATIENT)
Dept: MAMMOGRAPHY | Facility: HOSPITAL | Age: 61
Discharge: HOME | End: 2021-05-04
Payer: COMMERCIAL

## 2021-05-04 VITALS — WEIGHT: 152 LBS | BODY MASS INDEX: 23.04 KG/M2 | HEIGHT: 68 IN

## 2021-05-04 DIAGNOSIS — Z12.31 ENCOUNTER FOR SCREENING MAMMOGRAM FOR MALIGNANT NEOPLASM OF BREAST: ICD-10-CM

## 2021-05-04 PROCEDURE — 77067 SCR MAMMO BI INCL CAD: CPT

## 2021-05-04 PROCEDURE — 77063 BREAST TOMOSYNTHESIS BI: CPT

## 2021-06-29 ENCOUNTER — OFFICE VISIT (OUTPATIENT)
Dept: FAMILY MEDICINE CLINIC | Facility: CLINIC | Age: 61
End: 2021-06-29
Payer: COMMERCIAL

## 2021-06-29 VITALS
WEIGHT: 152 LBS | DIASTOLIC BLOOD PRESSURE: 78 MMHG | TEMPERATURE: 98.4 F | SYSTOLIC BLOOD PRESSURE: 134 MMHG | HEIGHT: 68 IN | BODY MASS INDEX: 23.04 KG/M2

## 2021-06-29 DIAGNOSIS — N39.0 URINARY TRACT INFECTION WITHOUT HEMATURIA, SITE UNSPECIFIED: Primary | ICD-10-CM

## 2021-06-29 LAB
SL AMB  POCT GLUCOSE, UA: ABNORMAL
SL AMB LEUKOCYTE ESTERASE,UA: ABNORMAL
SL AMB POCT BILIRUBIN,UA: ABNORMAL
SL AMB POCT BLOOD,UA: ABNORMAL
SL AMB POCT CLARITY,UA: ABNORMAL
SL AMB POCT COLOR,UA: ABNORMAL
SL AMB POCT KETONES,UA: ABNORMAL
SL AMB POCT NITRITE,UA: ABNORMAL
SL AMB POCT PH,UA: 6
SL AMB POCT SPECIFIC GRAVITY,UA: 1.02
SL AMB POCT URINE PROTEIN: ABNORMAL
SL AMB POCT UROBILINOGEN: ABNORMAL

## 2021-06-29 PROCEDURE — 1036F TOBACCO NON-USER: CPT | Performed by: FAMILY MEDICINE

## 2021-06-29 PROCEDURE — 99213 OFFICE O/P EST LOW 20 MIN: CPT | Performed by: FAMILY MEDICINE

## 2021-06-29 PROCEDURE — 81002 URINALYSIS NONAUTO W/O SCOPE: CPT | Performed by: FAMILY MEDICINE

## 2021-06-29 PROCEDURE — 3008F BODY MASS INDEX DOCD: CPT | Performed by: FAMILY MEDICINE

## 2021-06-29 RX ORDER — NITROFURANTOIN 25; 75 MG/1; MG/1
100 CAPSULE ORAL 2 TIMES DAILY
Qty: 10 CAPSULE | Refills: 0 | Status: SHIPPED | OUTPATIENT
Start: 2021-06-29 | End: 2021-07-04

## 2021-06-29 NOTE — PROGRESS NOTES
Assessment/Plan:  Urinalysis shows some blood, no nitrates or leukocytes  Her history is very suspicious for  early UTI  Rx put in for Macrobid  Push fluids  Call us if symptoms continue or increase  Problem List Items Addressed This Visit     None      Visit Diagnoses     Urinary tract infection without hematuria, site unspecified    -  Primary    Relevant Medications    nitrofurantoin (MACROBID) 100 mg capsule    Other Relevant Orders    POCT urine dip (Completed)           Diagnoses and all orders for this visit:    Urinary tract infection without hematuria, site unspecified  -     POCT urine dip  -     nitrofurantoin (MACROBID) 100 mg capsule; Take 1 capsule (100 mg total) by mouth 2 (two) times a day for 5 days        No problem-specific Assessment & Plan notes found for this encounter  Subjective:      Patient ID: Hipolito Tran is a 57 y o  female  Patient here today stating that yesterday at work started with urinary urgency and frequency  Positive dysuria  No fever chills  Did take some azo yesterday which did help with the bladder pain  She has been drinking cranberry juice  Difficulty Urinating   This is a new problem  The current episode started yesterday  The problem occurs every urination  The problem has been unchanged  The quality of the pain is described as burning and aching  The pain is moderate  There has been no fever  Associated symptoms include frequency and urgency  Pertinent negatives include no chills, discharge, flank pain, hematuria or vomiting  She has tried increased fluids for the symptoms  The treatment provided mild relief  The following portions of the patient's history were reviewed and updated as appropriate:   She has a past medical history of GERD (gastroesophageal reflux disease) and Hyperlipidemia ,  does not have any pertinent problems on file  ,   has a past surgical history that includes Bladder surgery;  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); Esophagogastroduodenoscopy (2013); Hysterectomy (2012); and pr lap, ventral hernia repair,reducible (N/A, 9/18/2020)  ,  family history includes No Known Problems in her father, maternal aunt, maternal aunt, maternal grandfather, maternal grandmother, mother, paternal grandfather, paternal grandmother, sister, sister, and sister  ,   reports that she quit smoking about 2 years ago  She has never used smokeless tobacco  She reports current alcohol use  She reports that she does not use drugs  ,  has No Known Allergies     Current Outpatient Medications   Medication Sig Dispense Refill    DAILY MULTIPLE VITAMINS/IRON TABS Take by mouth      pantoprazole (PROTONIX) 40 mg tablet Take 1 tablet (40 mg total) by mouth daily 90 tablet 3    simvastatin (ZOCOR) 40 mg tablet Take 1 tablet (40 mg total) by mouth every evening 90 tablet 3    Turmeric 400 MG CAPS Take 800 mg by mouth 2 (two) times a day      fluticasone (FLONASE) 50 mcg/act nasal spray 2 sprays into each nostril daily (Patient not taking: Reported on 6/29/2021) 1 Bottle 11    nitrofurantoin (MACROBID) 100 mg capsule Take 1 capsule (100 mg total) by mouth 2 (two) times a day for 5 days 10 capsule 0     No current facility-administered medications for this visit  Review of Systems   Constitutional: Negative  Negative for chills  Respiratory: Negative  Cardiovascular: Negative  Gastrointestinal: Negative  Negative for vomiting  Genitourinary: Positive for dysuria, frequency and urgency  Negative for flank pain and hematuria  Objective:  Vitals:    06/29/21 1337 06/29/21 1352   BP: (!) 152/8 134/78   BP Location:  Right arm   Patient Position:  Sitting   Cuff Size:  Standard   Temp: 98 4 °F (36 9 °C)    Weight: 68 9 kg (152 lb)    Height: 5' 8" (1 727 m)      Body mass index is 23 11 kg/m²  Physical Exam  Vitals reviewed  Constitutional:       General: She is not in acute distress       Appearance: Normal appearance  She is well-developed  She is not ill-appearing or diaphoretic  HENT:      Head: Normocephalic and atraumatic  Eyes:      Conjunctiva/sclera: Conjunctivae normal    Cardiovascular:      Rate and Rhythm: Normal rate and regular rhythm  Heart sounds: Normal heart sounds  No murmur heard  No friction rub  No gallop  Pulmonary:      Effort: Pulmonary effort is normal  No respiratory distress  Breath sounds: Normal breath sounds  No wheezing, rhonchi or rales  Musculoskeletal:      Right lower leg: No edema  Left lower leg: No edema  Neurological:      General: No focal deficit present  Mental Status: She is alert and oriented to person, place, and time  Psychiatric:         Mood and Affect: Mood normal          Behavior: Behavior normal          Thought Content:  Thought content normal          Judgment: Judgment normal

## 2021-08-02 ENCOUNTER — APPOINTMENT (OUTPATIENT)
Dept: LAB | Facility: HOSPITAL | Age: 61
End: 2021-08-02

## 2021-08-02 ENCOUNTER — TELEPHONE (OUTPATIENT)
Dept: FAMILY MEDICINE CLINIC | Facility: CLINIC | Age: 61
End: 2021-08-02

## 2021-08-02 DIAGNOSIS — N39.0 URINARY TRACT INFECTION WITHOUT HEMATURIA, SITE UNSPECIFIED: Primary | ICD-10-CM

## 2021-08-02 LAB
BACTERIA UR QL AUTO: ABNORMAL /HPF
BILIRUB UR QL STRIP: NEGATIVE
CLARITY UR: ABNORMAL
COLOR UR: ABNORMAL
GLUCOSE UR STRIP-MCNC: NEGATIVE MG/DL
HGB UR QL STRIP.AUTO: ABNORMAL
KETONES UR STRIP-MCNC: NEGATIVE MG/DL
LEUKOCYTE ESTERASE UR QL STRIP: ABNORMAL
NITRITE UR QL STRIP: POSITIVE
NON-SQ EPI CELLS URNS QL MICRO: ABNORMAL /HPF
PH UR STRIP.AUTO: 6 [PH]
PROT UR STRIP-MCNC: ABNORMAL MG/DL
RBC #/AREA URNS AUTO: ABNORMAL /HPF
SP GR UR STRIP.AUTO: >=1.03 (ref 1–1.03)
UROBILINOGEN UR QL STRIP.AUTO: 1 E.U./DL
WBC #/AREA URNS AUTO: ABNORMAL /HPF

## 2021-08-02 PROCEDURE — 81001 URINALYSIS AUTO W/SCOPE: CPT

## 2021-08-02 PROCEDURE — 87086 URINE CULTURE/COLONY COUNT: CPT

## 2021-08-02 PROCEDURE — 87077 CULTURE AEROBIC IDENTIFY: CPT

## 2021-08-02 PROCEDURE — 87186 SC STD MICRODIL/AGAR DIL: CPT

## 2021-08-02 RX ORDER — SULFAMETHOXAZOLE AND TRIMETHOPRIM 800; 160 MG/1; MG/1
1 TABLET ORAL 2 TIMES DAILY
Qty: 10 TABLET | Refills: 0 | Status: SHIPPED | OUTPATIENT
Start: 2021-08-02 | End: 2021-08-07

## 2021-08-02 NOTE — TELEPHONE ENCOUNTER
I would like her to coming get a UA C&S at the lab  Once she does that, can start Bactrim antibiotic that I put into the pharmacy  Call if symptoms continue or increase

## 2021-08-02 NOTE — TELEPHONE ENCOUNTER
Pt c/o another UTI  Said she just had one last month and you gave her antibiotic  Asking if you can send another prescription to the pharmacy?

## 2021-08-04 LAB — BACTERIA UR CULT: ABNORMAL

## 2021-09-03 ENCOUNTER — TELEPHONE (OUTPATIENT)
Dept: FAMILY MEDICINE CLINIC | Facility: CLINIC | Age: 61
End: 2021-09-03

## 2021-09-03 DIAGNOSIS — Z20.822 EXPOSURE TO COVID-19 VIRUS: Primary | ICD-10-CM

## 2021-09-03 PROCEDURE — U0005 INFEC AGEN DETEC AMPLI PROBE: HCPCS | Performed by: FAMILY MEDICINE

## 2021-09-03 PROCEDURE — U0003 INFECTIOUS AGENT DETECTION BY NUCLEIC ACID (DNA OR RNA); SEVERE ACUTE RESPIRATORY SYNDROME CORONAVIRUS 2 (SARS-COV-2) (CORONAVIRUS DISEASE [COVID-19]), AMPLIFIED PROBE TECHNIQUE, MAKING USE OF HIGH THROUGHPUT TECHNOLOGIES AS DESCRIBED BY CMS-2020-01-R: HCPCS | Performed by: FAMILY MEDICINE

## 2021-09-03 NOTE — TELEPHONE ENCOUNTER
SICK SINCE Monday, TESTED FOR COVID AND IS POSITIVE? SHE IS STUFFY AND WANTED TO KNOW IF YOU CAN PUT AN ORDER IN THE SYSTEM AND SHE WILL GO TO URGENT CARE IN [facility]?

## 2021-09-08 ENCOUNTER — TELEMEDICINE (OUTPATIENT)
Dept: FAMILY MEDICINE CLINIC | Facility: CLINIC | Age: 61
End: 2021-09-08
Payer: COMMERCIAL

## 2021-09-08 VITALS — OXYGEN SATURATION: 97 % | BODY MASS INDEX: 23.04 KG/M2 | HEIGHT: 68 IN | TEMPERATURE: 97.8 F | WEIGHT: 152 LBS

## 2021-09-08 DIAGNOSIS — U07.1 COVID-19: Primary | ICD-10-CM

## 2021-09-08 PROCEDURE — 99213 OFFICE O/P EST LOW 20 MIN: CPT | Performed by: FAMILY MEDICINE

## 2021-09-08 PROCEDURE — 1036F TOBACCO NON-USER: CPT | Performed by: FAMILY MEDICINE

## 2021-09-08 PROCEDURE — 3008F BODY MASS INDEX DOCD: CPT | Performed by: FAMILY MEDICINE

## 2021-09-08 NOTE — PROGRESS NOTES
COVID-19 Outpatient Progress Note    Assessment/Plan:    Problem List Items Addressed This Visit        Other    COVID-19 - Primary         Disposition:     I recommended continued isolation until at least 24 hours have passed since recovery defined as resolution of fever without the use of fever-reducing medications AND improvement in COVID symptoms AND 10 days have passed since onset of symptoms (or 10 days have passed since date of first positive viral diagnostic test for asymptomatic patients)  Patient will continue isolate she will call us if symptoms continue or increase  Patient may return to work on 09/13/2021  I have spent 10 minutes directly with the patient  Verification of patient location:    Patient is located in the following state in which I hold an active license [place]    Encounter provider Kellee Presley DO    Provider located at [address] [id number]    Recent Visits  Date Type Provider Dept   09/03/21 Telephone [facility]   Showing recent visits within past 7 days and meeting all other requirements  Today's Visits  Date Type Provider Dept   09/08/21 [facility]   Showing today's visits and meeting all other requirements  Future Appointments  No visits were found meeting these conditions  Showing future appointments within next 150 days and meeting all other requirements     This virtual check-in was done via [employer] and patient was informed that this is a secure, HIPAA-compliant platform  She agrees to proceed  Patient agrees to participate in a virtual check in via telephone or video visit instead of presenting to the office to address urgent/immediate medical needs  Patient is aware this is a billable service  After connecting through [facility], the patient was identified by name and date of birth   Emi Pinzon was informed that this was a telemedicine visit and that the exam was being conducted confidentially over secure lines  My office door was closed  No one else was in the room  Kristine Quintanilla acknowledged consent and understanding of privacy and security of the telemedicine visit  I informed the patient that I have reviewed her record in Epic and presented the opportunity for her to ask any questions regarding the visit today  The patient agreed to participate  Subjective:   Kristine Quintanilla is a 57 y o  female who has been screened for COVID-19  Symptom change since last report: improving  Patient's symptoms include nasal congestion and anosmia  Patient denies fever, chills, fatigue, malaise, rhinorrhea, sore throat, loss of taste, cough, shortness of breath, chest tightness, abdominal pain, nausea, vomiting, diarrhea, myalgias and headaches  Date of symptom onset: 9/2/2021  Date of positive COVID-19 PCR: 9/3/2021  COVID-19 vaccination status: Fully vaccinated    Video visit  Patient started having symptoms of stuffiness in the head and decreased smell last week  Patient's  tested positive for COVID early last week  Patient denies any fever chills  No cough or shortness of breath  Still has a stuffy nose and decreased smell  Has a mild rash on her stomach, it does not bother her that much  She is wondering if it is not from the new water system the [facility]      Lab Results   Component Value Date    SARSCOV2 Positive (A) 09/03/2021     Past Medical History:   Diagnosis Date    GERD (gastroesophageal reflux disease)     Hyperlipidemia      Past Surgical History:   Procedure Laterality Date    BLADDER SURGERY      ESOPHAGOGASTRODUODENOSCOPY  2013    Diagnostic    HYSTERECTOMY  2012    [place] LAP, VENTRAL HERNIA REPAIR,REDUCIBLE N/A 9/18/2020    Procedure: REPAIR HERNIA VENTRAL & umbilical LAPAROSCOPIC;  Surgeon: Jovana Byrnes DO;  Location: [facility];  Service: General    TETRALOGY OF FALLOT REPAIR W/ PULMONARY ATRESIA REPAIR  2013    Complete     Current Outpatient Medications   Medication Sig Dispense Refill    DAILY MULTIPLE VITAMINS/IRON TABS Take by mouth      pantoprazole (PROTONIX) 40 mg tablet Take 1 tablet (40 mg total) by mouth daily 90 tablet 3    simvastatin (ZOCOR) 40 mg tablet Take 1 tablet (40 mg total) by mouth every evening 90 tablet 3    Turmeric 400 MG CAPS Take 800 mg by mouth 2 (two) times a day       No current facility-administered medications for this visit  No Known Allergies    Review of Systems   Constitutional: Negative for chills, fatigue and fever  HENT: Positive for congestion  Negative for rhinorrhea and sore throat  Respiratory: Negative for cough, chest tightness and shortness of breath  Cardiovascular: Negative  Gastrointestinal: Negative for abdominal pain, diarrhea, nausea and vomiting  Genitourinary: Negative  Musculoskeletal: Negative for myalgias  Neurological: Negative for headaches  Objective:    Vitals:    09/08/21 1240   Temp: 97 8 °F (36 6 °C)   SpO2: 97%   Weight: 68 9 kg (152 lb)   Height: 5' 8" (1 727 m)       Physical Exam  Vitals reviewed  Constitutional:       General: She is not in acute distress  Appearance: Normal appearance  She is not ill-appearing or toxic-appearing  HENT:      Head: Normocephalic and atraumatic  Eyes:      Conjunctiva/sclera: Conjunctivae normal    Pulmonary:      Comments: Patient talking in full sentences in no distress  Neurological:      General: No focal deficit present  Mental Status: She is alert and oriented to person, place, and time  Psychiatric:         Mood and Affect: Mood normal          Behavior: Behavior normal          Thought Content: Thought content normal          Judgment: Judgment normal          VIRTUAL VISIT DISCLAIMER    Mera Encinas verbally agrees to participate in [employer]   Pt is aware that Virtual Care Services could be limited without vital signs or the ability to perform a full hands-on physical Darin Spears understands she or the provider may request at any time to terminate the video visit and request the patient to seek care or treatment in person

## 2021-09-30 DIAGNOSIS — E78.49 OTHER HYPERLIPIDEMIA: ICD-10-CM

## 2021-09-30 DIAGNOSIS — K21.9 GASTROESOPHAGEAL REFLUX DISEASE WITHOUT ESOPHAGITIS: ICD-10-CM

## 2021-09-30 RX ORDER — SIMVASTATIN 40 MG
40 TABLET ORAL EVERY EVENING
Qty: 90 TABLET | Refills: 3 | Status: SHIPPED | OUTPATIENT
Start: 2021-09-30 | End: 2022-01-27 | Stop reason: SDUPTHER

## 2021-09-30 RX ORDER — PANTOPRAZOLE SODIUM 40 MG/1
40 TABLET, DELAYED RELEASE ORAL DAILY
Qty: 90 TABLET | Refills: 3 | Status: SHIPPED | OUTPATIENT
Start: 2021-09-30 | End: 2021-10-08 | Stop reason: SDUPTHER

## 2021-09-30 NOTE — TELEPHONE ENCOUNTER
Since patient had COVID, recommend waiting 6 months to get her booster  Her getting the infection built up her antibodies

## 2021-09-30 NOTE — TELEPHONE ENCOUNTER
WORK WANTS HER TO TAKE THE COVID BOOSTER, SHE HAD COVID 3-4 WEEKS AGO   SHOULD SHE TAKE IT AND WHEN?

## 2021-10-08 DIAGNOSIS — K21.9 GASTROESOPHAGEAL REFLUX DISEASE WITHOUT ESOPHAGITIS: ICD-10-CM

## 2021-10-08 RX ORDER — PANTOPRAZOLE SODIUM 40 MG/1
40 TABLET, DELAYED RELEASE ORAL DAILY
Qty: 90 TABLET | Refills: 3 | Status: SHIPPED | OUTPATIENT
Start: 2021-10-08 | End: 2022-01-27 | Stop reason: SDUPTHER

## 2021-11-16 ENCOUNTER — TELEPHONE (OUTPATIENT)
Dept: FAMILY MEDICINE CLINIC | Facility: CLINIC | Age: 61
End: 2021-11-16

## 2021-11-16 DIAGNOSIS — R30.0 DYSURIA: Primary | ICD-10-CM

## 2021-11-16 RX ORDER — NITROFURANTOIN 25; 75 MG/1; MG/1
100 CAPSULE ORAL 2 TIMES DAILY
Qty: 10 CAPSULE | Refills: 0 | Status: SHIPPED | OUTPATIENT
Start: 2021-11-16 | End: 2021-11-21

## 2022-01-27 DIAGNOSIS — K21.9 GASTROESOPHAGEAL REFLUX DISEASE WITHOUT ESOPHAGITIS: ICD-10-CM

## 2022-01-27 DIAGNOSIS — E78.49 OTHER HYPERLIPIDEMIA: ICD-10-CM

## 2022-01-27 RX ORDER — PANTOPRAZOLE SODIUM 40 MG/1
40 TABLET, DELAYED RELEASE ORAL DAILY
Qty: 30 TABLET | Refills: 5 | Status: SHIPPED | OUTPATIENT
Start: 2022-01-27

## 2022-01-27 RX ORDER — SIMVASTATIN 40 MG
40 TABLET ORAL EVERY EVENING
Qty: 90 TABLET | Refills: 3 | Status: SHIPPED | OUTPATIENT
Start: 2022-01-27

## 2022-07-05 ENCOUNTER — TELEPHONE (OUTPATIENT)
Dept: FAMILY MEDICINE CLINIC | Facility: CLINIC | Age: 62
End: 2022-07-05

## 2022-07-05 DIAGNOSIS — R30.0 DYSURIA: Primary | ICD-10-CM

## 2022-07-05 RX ORDER — SULFAMETHOXAZOLE AND TRIMETHOPRIM 800; 160 MG/1; MG/1
1 TABLET ORAL 2 TIMES DAILY
Qty: 6 TABLET | Refills: 0 | Status: SHIPPED | OUTPATIENT
Start: 2022-07-05 | End: 2022-07-08

## 2022-07-05 NOTE — TELEPHONE ENCOUNTER
UTI back, can you rx something? She is changing pharmacy's to [facility]  That will have to be added and [facility] deleted

## 2022-11-09 ENCOUNTER — RA CDI HCC (OUTPATIENT)
Dept: OTHER | Facility: HOSPITAL | Age: 62
End: 2022-11-09

## 2022-11-09 NOTE — PROGRESS NOTES
[facility]  coding opportunities       Chart reviewed, no opportunity found: CHART REVIEWED, NO OPPORTUNITY FOUND        Patients Insurance        Commercial Insurance: [employer]

## 2022-11-16 ENCOUNTER — OFFICE VISIT (OUTPATIENT)
Dept: FAMILY MEDICINE CLINIC | Facility: CLINIC | Age: 62
End: 2022-11-16

## 2022-11-16 VITALS
HEART RATE: 84 BPM | OXYGEN SATURATION: 98 % | SYSTOLIC BLOOD PRESSURE: 134 MMHG | BODY MASS INDEX: 24.11 KG/M2 | WEIGHT: 153.6 LBS | HEIGHT: 67 IN | TEMPERATURE: 98.6 F | DIASTOLIC BLOOD PRESSURE: 84 MMHG

## 2022-11-16 DIAGNOSIS — E78.49 OTHER HYPERLIPIDEMIA: ICD-10-CM

## 2022-11-16 DIAGNOSIS — Z00.00 ANNUAL PHYSICAL EXAM: Primary | ICD-10-CM

## 2022-11-16 DIAGNOSIS — Z12.31 ENCOUNTER FOR SCREENING MAMMOGRAM FOR MALIGNANT NEOPLASM OF BREAST: ICD-10-CM

## 2022-11-16 DIAGNOSIS — R73.9 HYPERGLYCEMIA: ICD-10-CM

## 2022-11-16 DIAGNOSIS — E03.8 SUBCLINICAL HYPOTHYROIDISM: ICD-10-CM

## 2022-11-16 DIAGNOSIS — K21.9 GASTROESOPHAGEAL REFLUX DISEASE WITHOUT ESOPHAGITIS: ICD-10-CM

## 2022-11-16 RX ORDER — DIPHENOXYLATE HYDROCHLORIDE AND ATROPINE SULFATE 2.5; .025 MG/1; MG/1
1 TABLET ORAL DAILY
COMMUNITY

## 2022-11-16 RX ORDER — PANTOPRAZOLE SODIUM 40 MG/1
40 TABLET, DELAYED RELEASE ORAL DAILY
Qty: 90 TABLET | Refills: 3 | Status: SHIPPED | OUTPATIENT
Start: 2022-11-16

## 2022-11-16 NOTE — PATIENT INSTRUCTIONS

## 2022-11-16 NOTE — PROGRESS NOTES
[facility]    NAME: Roxann Hernandez  AGE: 60 y o  SEX: female  : 1960     DATE: 2022     Assessment and Plan: For her constipation, patient will increase her fiber and try MiraLax for the next week or 2  If her constipation continues, she will call us  Blood work ordered  Follow-up in 6 months or p r n     Problem List Items Addressed This Visit        Digestive    Gastroesophageal reflux disease without esophagitis    Relevant Medications    pantoprazole (PROTONIX) 40 mg tablet       Endocrine    Subclinical hypothyroidism    Relevant Orders    CBC and differential    TSH, 3rd generation with Free T4 reflex       Other    Hyperglycemia    Relevant Orders    Hemoglobin A1C    CBC and differential    Other hyperlipidemia    Relevant Orders    Comprehensive metabolic panel    CBC and differential    Lipid Panel with Direct LDL reflex   Other Visit Diagnoses     Annual physical exam    -  Primary    Encounter for screening mammogram for malignant neoplasm of breast        Relevant Orders    Mammo screening bilateral w 3d & cad          Immunizations and preventive care screenings were discussed with patient today  Appropriate education was printed on patient's after visit summary  Counseling:  Dental Health: discussed importance of regular tooth brushing, flossing, and dental visits  Exercise: the importance of regular exercise/physical activity was discussed  Recommend exercise 3-5 times per week for at least 30 minutes  Depression Screening and Follow-up Plan: Patient was screened for depression during today's encounter  They screened negative with a PHQ-2 score of 0  Return in 6 months (on 2023) for Recheck  Chief Complaint:     Chief Complaint   Patient presents with   • Annual Exam     Doing well, some constipation        History of Present Illness:     Adult Annual Physical   Patient here for a comprehensive physical exam  The patient reports problems - constipated for past couple weeks  Noticed after switching vitamins  Diet and Physical Activity  Diet/Nutrition: well balanced diet  Exercise: walking  Depression Screening  PHQ-2/9 Depression Screening    Little interest or pleasure in doing things: 0 - not at all  Feeling down, depressed, or hopeless: 0 - not at all  PHQ-2 Score: 0  PHQ-2 Interpretation: Negative depression screen       General Health  Sleep: sleeps well  Hearing: normal - bilateral   Vision: wears glasses  Dental: regular dental visits  /GYN Health  Patient is: postmenopausal       Review of Systems:     Review of Systems   Constitutional: Negative  Respiratory: Negative  Cardiovascular: Negative  Gastrointestinal: Positive for constipation  Genitourinary: Negative  Past Medical History:     Past Medical History:   Diagnosis Date   • GERD (gastroesophageal reflux disease)    • Hyperlipidemia       Past Surgical History:     Past Surgical History:   Procedure Laterality Date   • BLADDER SURGERY     • ESOPHAGOGASTRODUODENOSCOPY  2013    Diagnostic   • HYSTERECTOMY  2012   • [place] LAP, VENTRAL HERNIA REPAIR,REDUCIBLE N/A 9/18/2020    Procedure: REPAIR HERNIA VENTRAL & umbilical LAPAROSCOPIC;  Surgeon: Sophia Bishop DO;  Location: [facility];  Service: General   • TETRALOGY OF FALLOT REPAIR W/ PULMONARY ATRESIA REPAIR  2013    Complete      Social History:     Social History     Socioeconomic History   • Marital status: /Civil Union     Spouse name: None   • Number of children: None   • Years of education: None   • Highest education level: None   Occupational History   • None   Tobacco Use   • Smoking status: Former     Types: Cigarettes     Quit date: 5/18/2019     Years since quitting: 3 5   • Smokeless tobacco: Never   Vaping Use   • Vaping Use: Never used   Substance and Sexual Activity   • Alcohol use: Yes     Comment: rarely   • Drug use:  No • Sexual activity: None   Other Topics Concern   • None   Social History Narrative    No living will     Social Determinants of Health     Financial Resource Strain: Not on file   Food Insecurity: Not on file   Transportation Needs: Not on file   Physical Activity: Not on file   Stress: Not on file   Social Connections: Not on file   Intimate Partner Violence: Not on file   Housing Stability: Not on file      Family History:     Family History   Problem Relation Age of Onset   • No Known Problems Mother    • No Known Problems Father    • No Known Problems Sister    • No Known Problems Maternal Grandmother    • No Known Problems Maternal Grandfather    • No Known Problems Paternal Grandmother    • No Known Problems Paternal Grandfather    • No Known Problems Sister    • No Known Problems Sister    • No Known Problems Maternal Aunt    • No Known Problems Maternal Aunt       Current Medications:     Current Outpatient Medications   Medication Sig Dispense Refill   • Cranberry (CRANBEREX PO) Take by mouth     • multivitamin (THERAGRAN) TABS Take 1 tablet by mouth daily     • pantoprazole (PROTONIX) 40 mg tablet Take 1 tablet (40 mg total) by mouth daily 90 tablet 3   • simvastatin (ZOCOR) 40 mg tablet Take 1 tablet (40 mg total) by mouth every evening 90 tablet 3   • Turmeric 400 MG CAPS Take 800 mg by mouth 2 (two) times a day       No current facility-administered medications for this visit  Allergies:     No Known Allergies   Physical Exam:     /84   Pulse 84   Temp 98 6 °F (37 °C)   Ht 5' 7" (1 702 m)   Wt 69 7 kg (153 lb 9 6 oz)   SpO2 98%   BMI 24 06 kg/m²     Physical Exam  Vitals reviewed  Constitutional:       General: She is not in acute distress  Appearance: Normal appearance  She is well-developed  She is not diaphoretic  HENT:      Head: Normocephalic and atraumatic     Eyes:      Conjunctiva/sclera: Conjunctivae normal    Cardiovascular:      Rate and Rhythm: Normal rate and regular rhythm  Heart sounds: Normal heart sounds  No murmur heard  No friction rub  No gallop  Pulmonary:      Effort: Pulmonary effort is normal  No respiratory distress  Breath sounds: Normal breath sounds  No wheezing, rhonchi or rales  Musculoskeletal:      Right lower leg: No edema  Left lower leg: No edema  Neurological:      General: No focal deficit present  Mental Status: She is alert and oriented to person, place, and time  Psychiatric:         Mood and Affect: Mood normal          Behavior: Behavior normal          Thought Content:  Thought content normal          Judgment: Judgment normal           Everardo Eng DO  [address] [place] [address]

## 2022-12-07 ENCOUNTER — TELEPHONE (OUTPATIENT)
Dept: FAMILY MEDICINE CLINIC | Facility: CLINIC | Age: 62
End: 2022-12-07

## 2022-12-07 DIAGNOSIS — R30.0 DYSURIA: Primary | ICD-10-CM

## 2022-12-07 RX ORDER — NITROFURANTOIN 25; 75 MG/1; MG/1
100 CAPSULE ORAL 2 TIMES DAILY
Qty: 10 CAPSULE | Refills: 0 | Status: SHIPPED | OUTPATIENT
Start: 2022-12-07 | End: 2022-12-12

## 2023-01-11 ENCOUNTER — HOSPITAL ENCOUNTER (OUTPATIENT)
Dept: MAMMOGRAPHY | Facility: HOSPITAL | Age: 63
Discharge: HOME | End: 2023-01-11

## 2023-01-11 ENCOUNTER — APPOINTMENT (OUTPATIENT)
Dept: LAB | Facility: HOSPITAL | Age: 63
End: 2023-01-11

## 2023-01-11 DIAGNOSIS — Z12.31 ENCOUNTER FOR SCREENING MAMMOGRAM FOR MALIGNANT NEOPLASM OF BREAST: ICD-10-CM

## 2023-01-11 LAB
ALBUMIN SERPL BCP-MCNC: 3.9 G/DL (ref 3.5–5)
ALP SERPL-CCNC: 52 U/L (ref 46–116)
ALT SERPL W P-5'-P-CCNC: 30 U/L (ref 12–78)
ANION GAP SERPL CALCULATED.3IONS-SCNC: 8 MMOL/L (ref 4–13)
AST SERPL W P-5'-P-CCNC: 20 U/L (ref 5–45)
BASOPHILS # BLD AUTO: 0.04 THOUSANDS/ÂΜL (ref 0–0.1)
BASOPHILS NFR BLD AUTO: 1 % (ref 0–1)
BILIRUB SERPL-MCNC: 0.42 MG/DL (ref 0.2–1)
BUN SERPL-MCNC: 20 MG/DL (ref 5–25)
CALCIUM SERPL-MCNC: 9.3 MG/DL (ref 8.3–10.1)
CHLORIDE SERPL-SCNC: 103 MMOL/L (ref 96–108)
CHOLEST SERPL-MCNC: 204 MG/DL
CO2 SERPL-SCNC: 27 MMOL/L (ref 21–32)
CREAT SERPL-MCNC: 0.62 MG/DL (ref 0.6–1.3)
EOSINOPHIL # BLD AUTO: 0.09 THOUSAND/ÂΜL (ref 0–0.61)
EOSINOPHIL NFR BLD AUTO: 2 % (ref 0–6)
ERYTHROCYTE [DISTWIDTH] IN BLOOD BY AUTOMATED COUNT: 12.2 % (ref 11.6–15.1)
GFR SERPL CREATININE-BSD FRML MDRD: 96 ML/MIN/1.73SQ M
GLUCOSE P FAST SERPL-MCNC: 109 MG/DL (ref 65–99)
HCT VFR BLD AUTO: 40.8 % (ref 34.8–46.1)
HDLC SERPL-MCNC: 64 MG/DL
HGB BLD-MCNC: 13.6 G/DL (ref 11.5–15.4)
IMM GRANULOCYTES # BLD AUTO: 0.03 THOUSAND/UL (ref 0–0.2)
IMM GRANULOCYTES NFR BLD AUTO: 1 % (ref 0–2)
LDLC SERPL CALC-MCNC: 131 MG/DL (ref 0–100)
LYMPHOCYTES # BLD AUTO: 2.51 THOUSANDS/ÂΜL (ref 0.6–4.47)
LYMPHOCYTES NFR BLD AUTO: 48 % (ref 14–44)
MCH RBC QN AUTO: 31.6 PG (ref 26.8–34.3)
MCHC RBC AUTO-ENTMCNC: 33.3 G/DL (ref 31.4–37.4)
MCV RBC AUTO: 95 FL (ref 82–98)
MONOCYTES # BLD AUTO: 0.34 THOUSAND/ÂΜL (ref 0.17–1.22)
MONOCYTES NFR BLD AUTO: 7 % (ref 4–12)
NEUTROPHILS # BLD AUTO: 2.07 THOUSANDS/ÂΜL (ref 1.85–7.62)
NEUTS SEG NFR BLD AUTO: 41 % (ref 43–75)
NRBC BLD AUTO-RTO: 0 /100 WBCS
PLATELET # BLD AUTO: 282 THOUSANDS/UL (ref 149–390)
PMV BLD AUTO: 9.7 FL (ref 8.9–12.7)
POTASSIUM SERPL-SCNC: 4.2 MMOL/L (ref 3.5–5.3)
PROT SERPL-MCNC: 7.5 G/DL (ref 6.4–8.4)
RBC # BLD AUTO: 4.3 MILLION/UL (ref 3.81–5.12)
SODIUM SERPL-SCNC: 138 MMOL/L (ref 135–147)
TRIGL SERPL-MCNC: 44 MG/DL
TSH SERPL DL<=0.05 MIU/L-ACNC: 3.85 UIU/ML (ref 0.45–4.5)
WBC # BLD AUTO: 5.08 THOUSAND/UL (ref 4.31–10.16)

## 2023-01-12 LAB
EST. AVERAGE GLUCOSE BLD GHB EST-MCNC: 126 MG/DL
HBA1C MFR BLD: 6 %

## 2023-04-04 DIAGNOSIS — E78.49 OTHER HYPERLIPIDEMIA: ICD-10-CM

## 2023-04-04 RX ORDER — SIMVASTATIN 40 MG
40 TABLET ORAL EVERY EVENING
Qty: 90 TABLET | Refills: 3 | Status: SHIPPED | OUTPATIENT
Start: 2023-04-04

## 2023-04-13 PROBLEM — J04.0 LARYNGITIS: Status: ACTIVE | Noted: 2023-04-13

## 2023-04-13 PROBLEM — J06.9 UPPER RESPIRATORY TRACT INFECTION: Status: ACTIVE | Noted: 2023-04-13

## 2023-10-26 ENCOUNTER — OFFICE VISIT (OUTPATIENT)
Dept: FAMILY MEDICINE CLINIC | Facility: CLINIC | Age: 63
End: 2023-10-26
Payer: COMMERCIAL

## 2023-10-26 VITALS
DIASTOLIC BLOOD PRESSURE: 88 MMHG | WEIGHT: 157 LBS | HEIGHT: 67 IN | BODY MASS INDEX: 24.64 KG/M2 | SYSTOLIC BLOOD PRESSURE: 150 MMHG | TEMPERATURE: 96.7 F | OXYGEN SATURATION: 98 % | HEART RATE: 81 BPM

## 2023-10-26 DIAGNOSIS — E78.49 OTHER HYPERLIPIDEMIA: ICD-10-CM

## 2023-10-26 DIAGNOSIS — K21.9 GASTROESOPHAGEAL REFLUX DISEASE WITHOUT ESOPHAGITIS: ICD-10-CM

## 2023-10-26 DIAGNOSIS — R29.818 SUSPECTED SLEEP APNEA: Primary | ICD-10-CM

## 2023-10-26 PROBLEM — J04.0 LARYNGITIS: Status: RESOLVED | Noted: 2023-04-13 | Resolved: 2023-10-26

## 2023-10-26 PROBLEM — J06.9 UPPER RESPIRATORY TRACT INFECTION: Status: RESOLVED | Noted: 2023-04-13 | Resolved: 2023-10-26

## 2023-10-26 PROBLEM — R10.10 PAIN OF UPPER ABDOMEN: Status: RESOLVED | Noted: 2020-07-26 | Resolved: 2023-10-26

## 2023-10-26 PROBLEM — U07.1 COVID: Status: RESOLVED | Noted: 2021-09-04 | Resolved: 2023-10-26

## 2023-10-26 PROCEDURE — 99214 OFFICE O/P EST MOD 30 MIN: CPT | Performed by: PHYSICIAN ASSISTANT

## 2023-10-26 RX ORDER — PANTOPRAZOLE SODIUM 40 MG/1
40 TABLET, DELAYED RELEASE ORAL DAILY
Qty: 90 TABLET | Refills: 3 | Status: SHIPPED | OUTPATIENT
Start: 2023-10-26

## 2023-10-26 RX ORDER — SIMVASTATIN 40 MG
40 TABLET ORAL EVERY EVENING
Qty: 90 TABLET | Refills: 3 | Status: SHIPPED | OUTPATIENT
Start: 2023-10-26

## 2023-10-26 NOTE — PROGRESS NOTES
Name: Izabela Naranjo      : 1960      MRN: [id number]  Encounter Provider: Sukhdev Zaragoza PA-C  Encounter Date: 10/26/2023   Encounter department: [address]     1. Suspected sleep apnea  Assessment & Plan:  Home sleep study ordered to evaluate for YARIEL. Orders:  -     Home Study; Future    2. Gastroesophageal reflux disease without esophagitis  Assessment & Plan:  Stable with daily Protonix, continue same. Refill given. Orders:  -     pantoprazole (PROTONIX) 40 mg tablet; Take 1 tablet (40 mg total) by mouth daily    3. Other hyperlipidemia  Assessment & Plan:  Refill given of simvastatin. Orders:  -     simvastatin (ZOCOR) 40 mg tablet; Take 1 tablet (40 mg total) by mouth every evening        Depression Screening and Follow-up Plan: Patient was screened for depression during today's encounter. They screened negative with a PHQ-2 score of 0. Vaibhav Hennessy is here today complaining of daytime fatigue, chronic AM/daily headaches, and belief that she is not sleeping at night.  states that she must be sleeping because she snores. Review of Systems   Constitutional:  Positive for fatigue. Negative for chills and fever. HENT:  Negative for ear pain and sore throat. Eyes:  Negative for pain and visual disturbance. Respiratory:  Negative for cough and shortness of breath. Cardiovascular:  Negative for chest pain and palpitations. Gastrointestinal:  Negative for abdominal pain and vomiting. Genitourinary:  Negative for dysuria and hematuria. Musculoskeletal:  Negative for arthralgias and back pain. Skin:  Negative for color change and rash. Neurological:  Positive for headaches. Negative for seizures and syncope. All other systems reviewed and are negative.       Current Outpatient Medications on File Prior to Visit   Medication Sig   • Cranberry (CRANBEREX PO) Take by mouth   • multivitamin (THERAGRAN) TABS Take 1 tablet by mouth daily   • Turmeric 400 MG CAPS Take 800 mg by mouth 2 (two) times a day   • [DISCONTINUED] pantoprazole (PROTONIX) 40 mg tablet Take 1 tablet (40 mg total) by mouth daily   • [DISCONTINUED] simvastatin (ZOCOR) 40 mg tablet Take 1 tablet (40 mg total) by mouth every evening       Objective     /88   Pulse 81   Temp (!) 96.7 °F (35.9 °C)   Ht 5' 7" (1.702 m)   Wt 71.2 kg (157 lb)   SpO2 98%   BMI 24.59 kg/m²     Physical Exam  Vitals reviewed. Constitutional:       General: She is not in acute distress. Appearance: She is well-developed. She is not diaphoretic. HENT:      Head: Normocephalic and atraumatic. Right Ear: Hearing, tympanic membrane, ear canal and external ear normal.      Left Ear: Hearing, tympanic membrane, ear canal and external ear normal.      Nose: Nose normal.      Mouth/Throat:      Mouth: Mucous membranes are moist.      Pharynx: Oropharynx is clear. Uvula midline. No oropharyngeal exudate. Eyes:      General: No scleral icterus. Right eye: No discharge. Left eye: No discharge. Conjunctiva/sclera: Conjunctivae normal.   Neck:      Thyroid: No thyromegaly. Vascular: No carotid bruit. Cardiovascular:      Rate and Rhythm: Normal rate and regular rhythm. Heart sounds: Normal heart sounds. No murmur heard. Pulmonary:      Effort: Pulmonary effort is normal. No respiratory distress. Breath sounds: Normal breath sounds. No wheezing. Abdominal:      General: Bowel sounds are normal. There is no distension. Palpations: Abdomen is soft. There is no mass. Tenderness: There is no abdominal tenderness. There is no guarding or rebound. Musculoskeletal:         General: No tenderness. Normal range of motion. Cervical back: Neck supple. Lymphadenopathy:      Cervical: No cervical adenopathy. Skin:     General: Skin is warm and dry. Findings: No erythema or rash.    Neurological:      Mental Status: She is alert and oriented to person, place, and time. Psychiatric:         Mood and Affect: Mood is anxious. Behavior: Behavior normal.         Thought Content:  Thought content normal.         Judgment: Judgment normal.       Nora Huston PA-C

## 2023-11-06 ENCOUNTER — TELEPHONE (OUTPATIENT)
Dept: SLEEP CENTER | Facility: CLINIC | Age: 63
End: 2023-11-06

## 2023-11-06 NOTE — TELEPHONE ENCOUNTER
----- Message from Yessica Little MD sent at 11/6/2023  6:05 AM EST -----  Approved    ----- Message -----  From: Karla Murillo  Sent: 10/27/2023   1:52 PM EST  To: Sleep Medicine [facility] Provider    This Home sleep study needs approval.     If approved please sign and return to clerical pool. If denied please include reasons why. Also provide alternative testing if warranted. Please sign and return to clerical pool.

## 2024-01-18 ENCOUNTER — OFFICE VISIT (OUTPATIENT)
Dept: FAMILY MEDICINE CLINIC | Facility: CLINIC | Age: 64
End: 2024-01-18
Payer: COMMERCIAL

## 2024-01-18 VITALS
HEIGHT: 67 IN | TEMPERATURE: 97.6 F | DIASTOLIC BLOOD PRESSURE: 98 MMHG | WEIGHT: 154 LBS | SYSTOLIC BLOOD PRESSURE: 152 MMHG | OXYGEN SATURATION: 99 % | RESPIRATION RATE: 16 BRPM | HEART RATE: 64 BPM | BODY MASS INDEX: 24.17 KG/M2

## 2024-01-18 DIAGNOSIS — E03.8 SUBCLINICAL HYPOTHYROIDISM: ICD-10-CM

## 2024-01-18 DIAGNOSIS — E78.49 OTHER HYPERLIPIDEMIA: ICD-10-CM

## 2024-01-18 DIAGNOSIS — Z00.00 WELL ADULT EXAM: Primary | ICD-10-CM

## 2024-01-18 DIAGNOSIS — I10 ESSENTIAL HYPERTENSION: ICD-10-CM

## 2024-01-18 DIAGNOSIS — Z12.11 COLON CANCER SCREENING: ICD-10-CM

## 2024-01-18 DIAGNOSIS — Z12.31 BREAST CANCER SCREENING BY MAMMOGRAM: ICD-10-CM

## 2024-01-18 DIAGNOSIS — R73.9 HYPERGLYCEMIA: ICD-10-CM

## 2024-01-18 PROCEDURE — 99213 OFFICE O/P EST LOW 20 MIN: CPT | Performed by: PHYSICIAN ASSISTANT

## 2024-01-18 PROCEDURE — 99396 PREV VISIT EST AGE 40-64: CPT | Performed by: PHYSICIAN ASSISTANT

## 2024-01-18 RX ORDER — LISINOPRIL 20 MG/1
20 TABLET ORAL DAILY
Qty: 30 TABLET | Refills: 0 | Status: SHIPPED | OUTPATIENT
Start: 2024-01-18

## 2024-01-18 NOTE — PROGRESS NOTES
ADULT ANNUAL PHYSICAL  [facility]    NAME: Mera Encinas  AGE: 63 y.o. SEX: female  : 1960     DATE: 2024     Assessment and Plan:     Problem List Items Addressed This Visit        Endocrine    Subclinical hypothyroidism     Due for TSH level, order placed.         Relevant Orders    TSH, 3rd generation with Free T4 reflex       Cardiovascular and Mediastinum    Essential hypertension     Initiate treatment with lisinopril 20 mg daily. Pt to keep daily BP log, return with log in 2 weeks.         Relevant Medications    lisinopril (ZESTRIL) 20 mg tablet    Other Relevant Orders    Comprehensive metabolic panel       Other    Hyperglycemia     Due for labs including A1C. Orders placed.         Relevant Orders    CBC    Hemoglobin A1C    Other hyperlipidemia     Due for lipid panel, continue taking simvastatin daily.         Relevant Orders    Lipid Panel with Direct LDL reflex   Other Visit Diagnoses     Well adult exam    -  Primary    Breast cancer screening by mammogram        Relevant Orders    Mammo screening bilateral w 3d & cad    Colon cancer screening        Relevant Orders    Ambulatory Referral to Gastroenterology          Immunizations and preventive care screenings were discussed with patient today. Appropriate education was printed on patient's after visit summary.    Counseling:  Dental Health: discussed importance of regular tooth brushing, flossing, and dental visits.      Depression Screening and Follow-up Plan: Patient was screened for depression during today's encounter. They screened negative with a PHQ-2 score of 0.        Return in about 2 weeks (around 2024).     Chief Complaint:     Chief Complaint   Patient presents with   • Annual Exam     Pt presents in office for physical       History of Present Illness:     Adult Annual Physical   Patient here for a comprehensive physical exam. The patient reports no problems.  BP  today is elevated, pt states has been elevated x 4 years.    Diet and Physical Activity  Diet/Nutrition: well balanced diet.   Exercise: no formal exercise.      Depression Screening  PHQ-2/9 Depression Screening    Little interest or pleasure in doing things: 0 - not at all  Feeling down, depressed, or hopeless: 0 - not at all  PHQ-2 Score: 0  PHQ-2 Interpretation: Negative depression screen       General Health  Sleep: sleeps well.   Hearing: normal - bilateral.  Vision: goes for regular eye exams and wears glasses.   Dental: regular dental visits.          Review of Systems:     Review of Systems   Constitutional:  Negative for chills and fever.   HENT:  Negative for ear pain and sore throat.    Eyes:  Negative for pain and visual disturbance.   Respiratory:  Negative for cough and shortness of breath.    Cardiovascular:  Negative for chest pain and palpitations.   Gastrointestinal:  Negative for abdominal pain and vomiting.   Genitourinary:  Negative for dysuria and hematuria.   Musculoskeletal:  Negative for arthralgias and back pain.   Skin:  Negative for color change and rash.   Neurological:  Negative for seizures and syncope.   All other systems reviewed and are negative.     Past Medical History:     Past Medical History:   Diagnosis Date   • GERD (gastroesophageal reflux disease)    • Hyperlipidemia       Past Surgical History:     Past Surgical History:   Procedure Laterality Date   • BLADDER SURGERY     • ESOPHAGOGASTRODUODENOSCOPY  2013    Diagnostic   • HYSTERECTOMY  2012   • AL LAPS REPAIR HERNIA EXCEPT INCAL/INGUN REDUCIBLE N/A 9/18/2020    Procedure: REPAIR HERNIA VENTRAL & umbilical LAPAROSCOPIC;  Surgeon: Dayton Heredia DO;  Location: [facility];  Service: General   • TETRALOGY OF FALLOT REPAIR W/ PULMONARY ATRESIA REPAIR  2013    Complete      Social History:     Social History     Socioeconomic History   • Marital status: /Civil Union     Spouse name: None   • Number of children: None    • Years of education: None   • Highest education level: None   Occupational History   • None   Tobacco Use   • Smoking status: Former     Current packs/day: 0.00     Types: Cigarettes     Quit date: 2019     Years since quittin.6   • Smokeless tobacco: Never   Vaping Use   • Vaping status: Never Used   Substance and Sexual Activity   • Alcohol use: Yes     Comment: rarely   • Drug use: No   • Sexual activity: None   Other Topics Concern   • None   Social History Narrative    No living will     Social Determinants of Health     Financial Resource Strain: Not on file   Food Insecurity: Not on file   Transportation Needs: Not on file   Physical Activity: Not on file   Stress: Not on file   Social Connections: Not on file   Intimate Partner Violence: Not on file   Housing Stability: Not on file      Family History:     Family History   Problem Relation Age of Onset   • No Known Problems Mother    • No Known Problems Father    • No Known Problems Sister    • No Known Problems Maternal Grandmother    • No Known Problems Maternal Grandfather    • No Known Problems Paternal Grandmother    • No Known Problems Paternal Grandfather    • No Known Problems Sister    • No Known Problems Sister    • No Known Problems Maternal Aunt    • No Known Problems Maternal Aunt       Current Medications:     Current Outpatient Medications   Medication Sig Dispense Refill   • Cranberry (CRANBEREX PO) Take by mouth     • lisinopril (ZESTRIL) 20 mg tablet Take 1 tablet (20 mg total) by mouth daily 30 tablet 0   • multivitamin (THERAGRAN) TABS Take 1 tablet by mouth daily     • pantoprazole (PROTONIX) 40 mg tablet Take 1 tablet (40 mg total) by mouth daily 90 tablet 3   • simvastatin (ZOCOR) 40 mg tablet Take 1 tablet (40 mg total) by mouth every evening 90 tablet 3   • Turmeric 400 MG CAPS Take 800 mg by mouth 2 (two) times a day       No current facility-administered medications for this visit.      Allergies:     No Known  "Allergies   Physical Exam:     /98 (BP Location: Left arm, Patient Position: Sitting, Cuff Size: Standard)   Pulse 64   Temp 97.6 °F (36.4 °C) (Temporal)   Resp 16   Ht 5' 7\" (1.702 m)   Wt 69.9 kg (154 lb)   SpO2 99%   BMI 24.12 kg/m²     Physical Exam  Vitals and nursing note reviewed.   Constitutional:       General: She is not in acute distress.     Appearance: She is well-developed.   HENT:      Head: Normocephalic and atraumatic.   Eyes:      Conjunctiva/sclera: Conjunctivae normal.   Cardiovascular:      Rate and Rhythm: Normal rate and regular rhythm.      Heart sounds: No murmur heard.  Pulmonary:      Effort: Pulmonary effort is normal. No respiratory distress.      Breath sounds: Normal breath sounds.   Abdominal:      Palpations: Abdomen is soft.      Tenderness: There is no abdominal tenderness.   Musculoskeletal:         General: No swelling.      Cervical back: Neck supple.   Skin:     General: Skin is warm and dry.      Capillary Refill: Capillary refill takes less than 2 seconds.   Neurological:      Mental Status: She is alert.   Psychiatric:         Mood and Affect: Mood normal.          Crystal Horton PA-C  [place] [facility]    "

## 2024-01-18 NOTE — ASSESSMENT & PLAN NOTE
Initiate treatment with lisinopril 20 mg daily. Pt to keep daily BP log, return with log in 2 weeks.

## 2024-01-25 ENCOUNTER — TELEPHONE (OUTPATIENT)
Age: 64
End: 2024-01-25

## 2024-01-25 ENCOUNTER — PREP FOR PROCEDURE (OUTPATIENT)
Age: 64
End: 2024-01-25

## 2024-01-25 DIAGNOSIS — Z12.11 SCREENING FOR COLON CANCER: Primary | ICD-10-CM

## 2024-01-25 NOTE — TELEPHONE ENCOUNTER
Scheduled date of colonoscopy (as of today): 4/17/24  Physician performing colonoscopy: corin   Location of colonoscopy: [facility]  Bowel prep reviewed with patient: sarahi/homero  Instructions reviewed with patient by: vlad  Clearances: n/a

## 2024-02-01 ENCOUNTER — OFFICE VISIT (OUTPATIENT)
Dept: FAMILY MEDICINE CLINIC | Facility: CLINIC | Age: 64
End: 2024-02-01
Payer: COMMERCIAL

## 2024-02-01 VITALS
OXYGEN SATURATION: 99 % | HEART RATE: 66 BPM | HEIGHT: 67 IN | WEIGHT: 154 LBS | BODY MASS INDEX: 24.17 KG/M2 | SYSTOLIC BLOOD PRESSURE: 122 MMHG | TEMPERATURE: 97 F | RESPIRATION RATE: 18 BRPM | DIASTOLIC BLOOD PRESSURE: 80 MMHG

## 2024-02-01 DIAGNOSIS — I10 ESSENTIAL HYPERTENSION: ICD-10-CM

## 2024-02-01 PROCEDURE — 99213 OFFICE O/P EST LOW 20 MIN: CPT | Performed by: PHYSICIAN ASSISTANT

## 2024-02-01 RX ORDER — LISINOPRIL 20 MG/1
20 TABLET ORAL DAILY
Qty: 90 TABLET | Refills: 1 | Status: SHIPPED | OUTPATIENT
Start: 2024-02-01

## 2024-02-01 NOTE — PROGRESS NOTES
Name: Mera Encinas      : 1960      MRN: [id number]  Encounter Provider: Crystal Horton PA-C  Encounter Date: 2024   Encounter department: [place] [facility]    Assessment & Plan     1. Essential hypertension  Assessment & Plan:  Well controlled with lisinopril 20 mg daily. Recommend continuing medication, refill given. Continue to check BP at home, RTO 3 months or sooner PRN.    Orders:  -     lisinopril (ZESTRIL) 20 mg tablet; Take 1 tablet (20 mg total) by mouth daily        Depression Screening and Follow-up Plan: Patient was screened for depression during today's encounter. They screened negative with a PHQ-2 score of 0.        Subjective      Dyan returns today for 2 week follow up for blood pressure.  BP well controlled now since taking lisinopril 20 mg daily.       Review of Systems   Constitutional:  Positive for fatigue. Negative for chills and fever.   HENT:  Negative for ear pain and sore throat.    Eyes:  Negative for pain and visual disturbance.   Respiratory:  Negative for cough and shortness of breath.    Cardiovascular:  Negative for chest pain and palpitations.   Gastrointestinal:  Negative for abdominal pain and vomiting.   Genitourinary:  Negative for dysuria and hematuria.   Musculoskeletal:  Negative for arthralgias and back pain.   Skin:  Negative for color change and rash.   Neurological:  Negative for seizures and syncope.   All other systems reviewed and are negative.      Current Outpatient Medications on File Prior to Visit   Medication Sig   • Cranberry (CRANBEREX PO) Take by mouth   • multivitamin (THERAGRAN) TABS Take 1 tablet by mouth daily   • pantoprazole (PROTONIX) 40 mg tablet Take 1 tablet (40 mg total) by mouth daily   • simvastatin (ZOCOR) 40 mg tablet Take 1 tablet (40 mg total) by mouth every evening   • Turmeric 400 MG CAPS Take 800 mg by mouth 2 (two) times a day   • [DISCONTINUED] lisinopril (ZESTRIL) 20 mg tablet Take 1 tablet (20 mg total) by mouth  "daily       Objective     /80 (BP Location: Left arm, Patient Position: Sitting, Cuff Size: Large)   Pulse 66   Temp (!) 97 °F (36.1 °C) (Temporal)   Resp 18   Ht 5' 7\" (1.702 m)   Wt 69.9 kg (154 lb)   SpO2 99%   BMI 24.12 kg/m²     Physical Exam  Vitals reviewed.   Constitutional:       General: She is not in acute distress.     Appearance: She is well-developed. She is not diaphoretic.   HENT:      Head: Normocephalic and atraumatic.      Right Ear: Hearing, tympanic membrane, ear canal and external ear normal.      Left Ear: Hearing, tympanic membrane, ear canal and external ear normal.      Nose: Nose normal.      Mouth/Throat:      Mouth: Mucous membranes are moist.      Pharynx: Oropharynx is clear. Uvula midline. No oropharyngeal exudate.   Eyes:      General: No scleral icterus.        Right eye: No discharge.         Left eye: No discharge.      Conjunctiva/sclera: Conjunctivae normal.   Neck:      Thyroid: No thyromegaly.      Vascular: No carotid bruit.   Cardiovascular:      Rate and Rhythm: Normal rate and regular rhythm.      Heart sounds: Normal heart sounds. No murmur heard.  Pulmonary:      Effort: Pulmonary effort is normal. No respiratory distress.      Breath sounds: Normal breath sounds. No wheezing.   Abdominal:      General: Bowel sounds are normal. There is no distension.      Palpations: Abdomen is soft. There is no mass.      Tenderness: There is no abdominal tenderness. There is no guarding or rebound.   Musculoskeletal:         General: No tenderness. Normal range of motion.      Cervical back: Neck supple.   Lymphadenopathy:      Cervical: No cervical adenopathy.   Skin:     General: Skin is warm and dry.      Findings: No erythema or rash.   Neurological:      Mental Status: She is alert and oriented to person, place, and time.   Psychiatric:         Behavior: Behavior normal.         Thought Content: Thought content normal.         Judgment: Judgment normal.       Crystal " KARLA Horton

## 2024-02-01 NOTE — ASSESSMENT & PLAN NOTE
Well controlled with lisinopril 20 mg daily. Recommend continuing medication, refill given. Continue to check BP at home, RTO 3 months or sooner PRN.

## 2024-02-07 ENCOUNTER — HOSPITAL ENCOUNTER (OUTPATIENT)
Dept: MAMMOGRAPHY | Facility: HOSPITAL | Age: 64
Discharge: HOME | End: 2024-02-07
Payer: COMMERCIAL

## 2024-02-07 VITALS — HEIGHT: 67 IN | WEIGHT: 154 LBS | BODY MASS INDEX: 24.17 KG/M2

## 2024-02-07 DIAGNOSIS — Z12.31 BREAST CANCER SCREENING BY MAMMOGRAM: ICD-10-CM

## 2024-02-07 PROCEDURE — 77067 SCR MAMMO BI INCL CAD: CPT

## 2024-02-07 PROCEDURE — 77063 BREAST TOMOSYNTHESIS BI: CPT

## 2024-03-29 ENCOUNTER — OFFICE VISIT (OUTPATIENT)
Dept: FAMILY MEDICINE CLINIC | Facility: CLINIC | Age: 64
End: 2024-03-29
Payer: COMMERCIAL

## 2024-03-29 VITALS
RESPIRATION RATE: 16 BRPM | DIASTOLIC BLOOD PRESSURE: 80 MMHG | BODY MASS INDEX: 24.01 KG/M2 | HEART RATE: 79 BPM | TEMPERATURE: 98.1 F | HEIGHT: 67 IN | WEIGHT: 153 LBS | SYSTOLIC BLOOD PRESSURE: 140 MMHG | OXYGEN SATURATION: 96 %

## 2024-03-29 DIAGNOSIS — N30.01 ACUTE CYSTITIS WITH HEMATURIA: Primary | ICD-10-CM

## 2024-03-29 PROCEDURE — 87186 SC STD MICRODIL/AGAR DIL: CPT | Performed by: PHYSICIAN ASSISTANT

## 2024-03-29 PROCEDURE — 99213 OFFICE O/P EST LOW 20 MIN: CPT | Performed by: PHYSICIAN ASSISTANT

## 2024-03-29 PROCEDURE — 87077 CULTURE AEROBIC IDENTIFY: CPT | Performed by: PHYSICIAN ASSISTANT

## 2024-03-29 PROCEDURE — 87086 URINE CULTURE/COLONY COUNT: CPT | Performed by: PHYSICIAN ASSISTANT

## 2024-03-29 PROCEDURE — 81002 URINALYSIS NONAUTO W/O SCOPE: CPT | Performed by: PHYSICIAN ASSISTANT

## 2024-03-29 RX ORDER — CIPROFLOXACIN 500 MG/1
500 TABLET, FILM COATED ORAL EVERY 12 HOURS SCHEDULED
Qty: 10 TABLET | Refills: 0 | Status: SHIPPED | OUTPATIENT
Start: 2024-03-29 | End: 2024-04-03

## 2024-03-29 NOTE — ASSESSMENT & PLAN NOTE
Given Cipro, take until finished. Recommend drinking water, cranberry juice, change panty liner frequently, urinate as soon as feels urge.

## 2024-03-29 NOTE — PROGRESS NOTES
Name: Mera Encinas      : 1960      MRN: [id number]  Encounter Provider: Crystal Horton PA-C  Encounter Date: 3/29/2024   Encounter department: [place] [facility]    Assessment & Plan     1. Acute cystitis with hematuria  Assessment & Plan:  Given Cipro, take until finished. Recommend drinking water, cranberry juice, change panty liner frequently, urinate as soon as feels urge.      Orders:  -     POCT urine dip  -     Urine culture; Future  -     Urine culture  -     ciprofloxacin (CIPRO) 500 mg tablet; Take 1 tablet (500 mg total) by mouth every 12 (twelve) hours for 5 days      Depression Screening and Follow-up Plan: Patient was screened for depression during today's encounter. They screened negative with a PHQ-2 score of 0.        Subjective      Dyan is here today for acute urinary complaints.      Review of Systems   Constitutional:  Negative for chills and fever.   HENT:  Negative for ear pain and sore throat.    Eyes:  Negative for pain and visual disturbance.   Respiratory:  Negative for cough and shortness of breath.    Cardiovascular:  Negative for chest pain and palpitations.   Gastrointestinal:  Negative for abdominal pain and vomiting.   Genitourinary:  Positive for dysuria.   Musculoskeletal:  Negative for arthralgias and back pain.   Skin:  Negative for color change and rash.   Neurological:  Negative for seizures and syncope.   All other systems reviewed and are negative.      Current Outpatient Medications on File Prior to Visit   Medication Sig   • Cranberry (CRANBEREX PO) Take by mouth   • lisinopril (ZESTRIL) 20 mg tablet Take 1 tablet (20 mg total) by mouth daily   • multivitamin (THERAGRAN) TABS Take 1 tablet by mouth daily   • pantoprazole (PROTONIX) 40 mg tablet Take 1 tablet (40 mg total) by mouth daily   • simvastatin (ZOCOR) 40 mg tablet Take 1 tablet (40 mg total) by mouth every evening   • Turmeric 400 MG CAPS Take 800 mg by mouth 2 (two) times a day       Objective  "    /80 (BP Location: Left arm, Patient Position: Sitting, Cuff Size: Adult)   Pulse 79   Temp 98.1 °F (36.7 °C) (Temporal)   Resp 16   Ht 5' 7\" (1.702 m)   Wt 69.4 kg (153 lb)   SpO2 96%   BMI 23.96 kg/m²     Physical Exam  Vitals reviewed.   Constitutional:       General: She is not in acute distress.     Appearance: She is well-developed. She is not diaphoretic.   HENT:      Head: Normocephalic and atraumatic.      Right Ear: Hearing, tympanic membrane, ear canal and external ear normal.      Left Ear: Hearing, tympanic membrane, ear canal and external ear normal.      Nose: Nose normal.      Mouth/Throat:      Mouth: Mucous membranes are moist.      Pharynx: Oropharynx is clear. Uvula midline. No oropharyngeal exudate.   Eyes:      General: No scleral icterus.        Right eye: No discharge.         Left eye: No discharge.      Conjunctiva/sclera: Conjunctivae normal.   Neck:      Thyroid: No thyromegaly.      Vascular: No carotid bruit.   Cardiovascular:      Rate and Rhythm: Normal rate and regular rhythm.      Heart sounds: Normal heart sounds. No murmur heard.  Pulmonary:      Effort: Pulmonary effort is normal. No respiratory distress.      Breath sounds: Normal breath sounds. No wheezing.   Abdominal:      General: Bowel sounds are normal. There is no distension.      Palpations: Abdomen is soft. There is no mass.      Tenderness: There is no abdominal tenderness. There is no guarding or rebound.   Musculoskeletal:         General: No tenderness. Normal range of motion.      Cervical back: Neck supple.   Lymphadenopathy:      Cervical: No cervical adenopathy.   Skin:     General: Skin is warm and dry.      Findings: No erythema or rash.   Neurological:      Mental Status: She is alert and oriented to person, place, and time.   Psychiatric:         Behavior: Behavior normal.         Thought Content: Thought content normal.         Judgment: Judgment normal.       Crystal Horton PA-C    "

## 2024-03-31 LAB — BACTERIA UR CULT: ABNORMAL

## 2024-04-02 DIAGNOSIS — N30.01 ACUTE CYSTITIS WITH HEMATURIA: Primary | ICD-10-CM

## 2024-04-02 RX ORDER — NITROFURANTOIN 25; 75 MG/1; MG/1
100 CAPSULE ORAL 2 TIMES DAILY
Qty: 10 CAPSULE | Refills: 0 | Status: SHIPPED | OUTPATIENT
Start: 2024-04-02 | End: 2024-04-08

## 2024-04-03 ENCOUNTER — APPOINTMENT (EMERGENCY)
Dept: CT IMAGING | Facility: HOSPITAL | Age: 64
End: 2024-04-03
Payer: COMMERCIAL

## 2024-04-03 ENCOUNTER — HOSPITAL ENCOUNTER (EMERGENCY)
Facility: HOSPITAL | Age: 64
Discharge: HOME | End: 2024-04-03
Attending: EMERGENCY MEDICINE
Payer: COMMERCIAL

## 2024-04-03 VITALS
SYSTOLIC BLOOD PRESSURE: 158 MMHG | DIASTOLIC BLOOD PRESSURE: 74 MMHG | HEART RATE: 63 BPM | RESPIRATION RATE: 19 BRPM | OXYGEN SATURATION: 92 % | TEMPERATURE: 98.4 F

## 2024-04-03 DIAGNOSIS — R11.2 NAUSEA & VOMITING: ICD-10-CM

## 2024-04-03 DIAGNOSIS — R55 SYNCOPE: ICD-10-CM

## 2024-04-03 DIAGNOSIS — R79.89 ABNORMAL TSH: Primary | ICD-10-CM

## 2024-04-03 LAB
2HR DELTA HS TROPONIN: 1 NG/L
ALBUMIN SERPL BCP-MCNC: 4.2 G/DL (ref 3.5–5)
ALP SERPL-CCNC: 42 U/L (ref 34–104)
ALT SERPL W P-5'-P-CCNC: 19 U/L (ref 7–52)
ANION GAP SERPL CALCULATED.3IONS-SCNC: 8 MMOL/L (ref 4–13)
AST SERPL W P-5'-P-CCNC: 19 U/L (ref 13–39)
ATRIAL RATE: 250 BPM
ATRIAL RATE: 69 BPM
BASOPHILS # BLD AUTO: 0.05 THOUSANDS/ÂΜL (ref 0–0.1)
BASOPHILS NFR BLD AUTO: 1 % (ref 0–1)
BILIRUB SERPL-MCNC: 0.23 MG/DL (ref 0.2–1)
BUN SERPL-MCNC: 22 MG/DL (ref 5–25)
CALCIUM SERPL-MCNC: 9.8 MG/DL (ref 8.4–10.2)
CARDIAC TROPONIN I PNL SERPL HS: 3 NG/L
CARDIAC TROPONIN I PNL SERPL HS: 4 NG/L
CHLORIDE SERPL-SCNC: 104 MMOL/L (ref 96–108)
CO2 SERPL-SCNC: 26 MMOL/L (ref 21–32)
CREAT SERPL-MCNC: 0.69 MG/DL (ref 0.6–1.3)
EOSINOPHIL # BLD AUTO: 0.13 THOUSAND/ÂΜL (ref 0–0.61)
EOSINOPHIL NFR BLD AUTO: 2 % (ref 0–6)
ERYTHROCYTE [DISTWIDTH] IN BLOOD BY AUTOMATED COUNT: 12 % (ref 11.6–15.1)
FLUAV RNA RESP QL NAA+PROBE: NEGATIVE
FLUBV RNA RESP QL NAA+PROBE: NEGATIVE
GFR SERPL CREATININE-BSD FRML MDRD: 91 ML/MIN/1.73SQ M
GLUCOSE SERPL-MCNC: 125 MG/DL (ref 65–140)
HCT VFR BLD AUTO: 38.9 % (ref 34.8–46.1)
HGB BLD-MCNC: 12.8 G/DL (ref 11.5–15.4)
IMM GRANULOCYTES # BLD AUTO: 0.05 THOUSAND/UL (ref 0–0.2)
IMM GRANULOCYTES NFR BLD AUTO: 1 % (ref 0–2)
LYMPHOCYTES # BLD AUTO: 2.79 THOUSANDS/ÂΜL (ref 0.6–4.47)
LYMPHOCYTES NFR BLD AUTO: 49 % (ref 14–44)
MCH RBC QN AUTO: 32.1 PG (ref 26.8–34.3)
MCHC RBC AUTO-ENTMCNC: 32.9 G/DL (ref 31.4–37.4)
MCV RBC AUTO: 98 FL (ref 82–98)
MONOCYTES # BLD AUTO: 0.42 THOUSAND/ÂΜL (ref 0.17–1.22)
MONOCYTES NFR BLD AUTO: 7 % (ref 4–12)
NEUTROPHILS # BLD AUTO: 2.32 THOUSANDS/ÂΜL (ref 1.85–7.62)
NEUTS SEG NFR BLD AUTO: 40 % (ref 43–75)
NRBC BLD AUTO-RTO: 0 /100 WBCS
P AXIS: 48 DEGREES
PLATELET # BLD AUTO: 265 THOUSANDS/UL (ref 149–390)
PMV BLD AUTO: 9.6 FL (ref 8.9–12.7)
POTASSIUM SERPL-SCNC: 3.9 MMOL/L (ref 3.5–5.3)
PR INTERVAL: 106 MS
PROT SERPL-MCNC: 7.1 G/DL (ref 6.4–8.4)
QRS AXIS: 90 DEGREES
QRS AXIS: 93 DEGREES
QRSD INTERVAL: 104 MS
QRSD INTERVAL: 92 MS
QT INTERVAL: 432 MS
QT INTERVAL: 446 MS
QTC INTERVAL: 456 MS
QTC INTERVAL: 462 MS
RBC # BLD AUTO: 3.99 MILLION/UL (ref 3.81–5.12)
RSV RNA RESP QL NAA+PROBE: NEGATIVE
SARS-COV-2 RNA RESP QL NAA+PROBE: NEGATIVE
SODIUM SERPL-SCNC: 138 MMOL/L (ref 135–147)
T WAVE AXIS: 75 DEGREES
T WAVE AXIS: 78 DEGREES
TSH SERPL DL<=0.05 MIU/L-ACNC: 7.3 UIU/ML (ref 0.45–4.5)
VENTRICULAR RATE: 63 BPM
VENTRICULAR RATE: 69 BPM
WBC # BLD AUTO: 5.76 THOUSAND/UL (ref 4.31–10.16)

## 2024-04-03 PROCEDURE — 93005 ELECTROCARDIOGRAM TRACING: CPT

## 2024-04-03 PROCEDURE — 99285 EMERGENCY DEPT VISIT HI MDM: CPT | Performed by: EMERGENCY MEDICINE

## 2024-04-03 PROCEDURE — 84484 ASSAY OF TROPONIN QUANT: CPT | Performed by: EMERGENCY MEDICINE

## 2024-04-03 PROCEDURE — 84443 ASSAY THYROID STIM HORMONE: CPT | Performed by: EMERGENCY MEDICINE

## 2024-04-03 PROCEDURE — 90471 IMMUNIZATION ADMIN: CPT

## 2024-04-03 PROCEDURE — 93010 ELECTROCARDIOGRAM REPORT: CPT | Performed by: INTERNAL MEDICINE

## 2024-04-03 PROCEDURE — 0241U HB NFCT DS VIR RESP RNA 4 TRGT: CPT | Performed by: EMERGENCY MEDICINE

## 2024-04-03 PROCEDURE — 36415 COLL VENOUS BLD VENIPUNCTURE: CPT | Performed by: EMERGENCY MEDICINE

## 2024-04-03 PROCEDURE — 99284 EMERGENCY DEPT VISIT MOD MDM: CPT

## 2024-04-03 PROCEDURE — 96360 HYDRATION IV INFUSION INIT: CPT

## 2024-04-03 PROCEDURE — 74177 CT ABD & PELVIS W/CONTRAST: CPT

## 2024-04-03 PROCEDURE — 90715 TDAP VACCINE 7 YRS/> IM: CPT | Performed by: EMERGENCY MEDICINE

## 2024-04-03 PROCEDURE — 85025 COMPLETE CBC W/AUTO DIFF WBC: CPT | Performed by: EMERGENCY MEDICINE

## 2024-04-03 PROCEDURE — 70450 CT HEAD/BRAIN W/O DYE: CPT

## 2024-04-03 PROCEDURE — 96361 HYDRATE IV INFUSION ADD-ON: CPT

## 2024-04-03 PROCEDURE — 80053 COMPREHEN METABOLIC PANEL: CPT | Performed by: EMERGENCY MEDICINE

## 2024-04-03 RX ADMIN — SODIUM CHLORIDE 1000 ML: 0.9 INJECTION, SOLUTION INTRAVENOUS at 02:54

## 2024-04-03 RX ADMIN — IOHEXOL 100 ML: 350 INJECTION, SOLUTION INTRAVENOUS at 02:35

## 2024-04-03 RX ADMIN — TETANUS TOXOID, REDUCED DIPHTHERIA TOXOID AND ACELLULAR PERTUSSIS VACCINE, ADSORBED 0.5 ML: 5; 2.5; 8; 8; 2.5 SUSPENSION INTRAMUSCULAR at 02:53

## 2024-04-03 NOTE — ED PROVIDER NOTES
Emergency Department Trauma Note  Mera Encinas 64 y.o. female MRN: [id number]  Unit/Bed#: RM06/RM06 Encounter: [id number]      Trauma Alert: Trauma Acuity: Trauma Evaluation  Model of Arrival: Mode of Arrival: BLS via Trauma Squad Name and Number: [place]  Trauma Team: Current Providers  Attending Provider: Diego Black MD  Registered Nurse: Guillermina Jose RN  Registered Nurse: Cayla Simmons  Consultants:     None      History of Present Illness     Chief Complaint:   Chief Complaint   Patient presents with    Nausea     Pt states that she drank some vodka tonight and got nauseous and was walking to the bathroom and fell to the ground onto her knees.      HPI:  Mera Encinas is a 64 y.o. female who presents with .  Mechanism:Details of Incident: Patient was drinking alcohol tonight when she walked to the bathroom and fell to her knees. No thinners, no head strike Injury Date: 04/03/24 Injury Time: 0129 Injury Occurence Location - Specify County: [place]    64-year-old female who presents for syncope, falls.  Patient reports that she got up in the morning, she did have several drinks of vodka earlier in the night.  She describes that she went to the bathroom, had a slight headache that was not sudden onset or maximal at onset, not worst of life, and took some Tylenol.  She states that she then had several episodes of nausea, did not have vomiting.  She states that she had 2 episodes of syncope, she believes that she landed on her knees, did sustain a small scrape on the right knee.  She does not suspect that she hit her head.  She is not on blood thinners or antiplatelet agents she states that she feels a little off currently, however not significantly lightheaded, not significantly nauseous.  She reports otherwise she had a normal day yesterday.  She does report that she is being treated for UTI. she has no other complaints currently.  ROS otherwise negative.      Review of Systems    Constitutional:  Negative for chills and fever.   HENT:  Negative for congestion, rhinorrhea and sore throat.    Respiratory:  Negative for cough and shortness of breath.    Cardiovascular:  Negative for chest pain and palpitations.   Gastrointestinal:  Positive for nausea and vomiting. Negative for abdominal pain, constipation and diarrhea.   Genitourinary:  Negative for difficulty urinating and flank pain.   Musculoskeletal:  Negative for arthralgias.   Neurological:  Positive for syncope and headaches. Negative for dizziness, weakness and light-headedness.   Psychiatric/Behavioral:  Negative for agitation, behavioral problems and confusion.    All other systems reviewed and are negative.      Historical Information     Immunizations:   Immunization History   Administered Date(s) Administered    COVID-19 PFIZER VACCINE 0.3 ML IM 12/29/2020, 01/19/2021    COVID-19 Pfizer vac (Josh-sucrose, gray cap) 12 yr+ IM 02/14/2022    INFLUENZA 10/01/2012, 10/22/2018, 02/10/2021, 10/20/2023    Tdap 03/27/2018, 04/03/2024       Past Medical History:   Diagnosis Date    GERD (gastroesophageal reflux disease)     Hyperlipidemia        Family History   Problem Relation Age of Onset    No Known Problems Mother     No Known Problems Father     No Known Problems Sister     No Known Problems Maternal Grandmother     No Known Problems Maternal Grandfather     No Known Problems Paternal Grandmother     No Known Problems Paternal Grandfather     No Known Problems Sister     No Known Problems Sister     No Known Problems Maternal Aunt     No Known Problems Maternal Aunt      Past Surgical History:   Procedure Laterality Date    BLADDER SURGERY      ESOPHAGOGASTRODUODENOSCOPY  2013    Diagnostic    HYSTERECTOMY  2012    WA LAPS REPAIR HERNIA EXCEPT INCAL/INGUN REDUCIBLE N/A 9/18/2020    Procedure: REPAIR HERNIA VENTRAL & umbilical LAPAROSCOPIC;  Surgeon: Dayton Heredia DO;  Location: [facility];  Service: General    TETRALOGY OF FALLOT  REPAIR W/ PULMONARY ATRESIA REPAIR  2013    Complete     Social History     Tobacco Use    Smoking status: Former     Current packs/day: 0.00     Types: Cigarettes     Quit date: 2019     Years since quittin.8    Smokeless tobacco: Never   Vaping Use    Vaping status: Never Used   Substance Use Topics    Alcohol use: Yes     Comment: rarely    Drug use: No     E-Cigarette/Vaping    E-Cigarette Use Never User      E-Cigarette/Vaping Substances    Nicotine No     THC No     CBD No     Flavoring No     Other No     Unknown No        Family History: non-contributory    Meds/Allergies   Prior to Admission Medications   Prescriptions Last Dose Informant Patient Reported? Taking?   Cranberry (CRANBEREX PO)  Self Yes No   Sig: Take by mouth   Turmeric 400 MG CAPS  Self Yes No   Sig: Take 800 mg by mouth 2 (two) times a day   lisinopril (ZESTRIL) 20 mg tablet   No No   Sig: Take 1 tablet (20 mg total) by mouth daily   multivitamin (THERAGRAN) TABS  Self Yes No   Sig: Take 1 tablet by mouth daily   nitrofurantoin (MACROBID) 100 mg capsule   No No   Sig: Take 1 capsule (100 mg total) by mouth 2 (two) times a day for 5 days   pantoprazole (PROTONIX) 40 mg tablet   No No   Sig: Take 1 tablet (40 mg total) by mouth daily   simvastatin (ZOCOR) 40 mg tablet   No No   Sig: Take 1 tablet (40 mg total) by mouth every evening      Facility-Administered Medications: None       No Known Allergies    PHYSICAL EXAM    PE limited by:     Objective   Vitals:   First set: Temperature: 98.4 °F (36.9 °C) (24 021)  Pulse: 68 (24 0210)  Respirations: 18 (24 0210)  Blood Pressure: 137/81 (24 0210)  SpO2: 98 % (24 0210)    Primary Survey:   (A) Airway: Intact  (B) Breathing: Equal BL  (C) Circulation: Pulses:   normal  (D) Disabliity:  GCS Total:  15  (E) Expose:  Completed    Secondary Survey: (Click on Physical Exam tab above)  Physical Exam  Constitutional:       Appearance: She is well-developed.   HENT:       Head: Normocephalic and atraumatic.   Cardiovascular:      Rate and Rhythm: Normal rate and regular rhythm.      Heart sounds: Normal heart sounds. No murmur heard.     No friction rub.   Pulmonary:      Effort: Pulmonary effort is normal. No respiratory distress.      Breath sounds: Normal breath sounds. No wheezing or rales.   Abdominal:      General: Bowel sounds are normal. There is no distension.      Palpations: Abdomen is soft.      Tenderness: There is no abdominal tenderness.   Musculoskeletal:         General: Tenderness present. Normal range of motion.      Cervical back: Normal range of motion and neck supple.   Skin:     General: Skin is warm.      Findings: No lesion.      Comments: No midline tenderness of the C-, T-, or L-spine    No tenderness of the thorax    No instability or pain when pressing on pelvis    RUE:  Bruising to R forearm. no pain ranging joints of the shoulder, elbow, wrist, hand    LUE:  No tenderness, no pain ranging joints of the shoulder, elbow, wrist, hand    RLE:   No tenderness, no pain ranging the hip, knee, ankle.  Small scrape to the right knee.    LLE:   No tenderness, no pain ranging the hip, knee, ankle     Neurological:      Mental Status: She is alert and oriented to person, place, and time.      Coordination: Coordination normal.   Psychiatric:         Behavior: Behavior normal.         Thought Content: Thought content normal.         Judgment: Judgment normal.         Cervical spine cleared by clinical criteria? Yes     Invasive Devices       None                   Lab Results:   Results Reviewed       Procedure Component Value Units Date/Time    HS Troponin I 2hr [[id number]]  (Normal) Collected: 04/03/24 0424    Lab Status: Final result Specimen: Blood from Arm, Right Updated: 04/03/24 0502     hs TnI 2hr 4 ng/L      Delta 2hr hsTnI 1 ng/L     HS Troponin I 4hr [[id number]]     Lab Status: No result Specimen: Blood     COVID/FLU/RSV [[id number]]  (Normal)  Collected: 04/03/24 0221    Lab Status: Final result Specimen: Nares from Nose Updated: 04/03/24 0305     SARS-CoV-2 Negative     INFLUENZA A PCR Negative     INFLUENZA B PCR Negative     RSV PCR Negative    Narrative:      FOR PEDIATRIC PATIENTS - copy/paste COVID Guidelines URL to browser: [email or web address]    SARS-CoV-2 assay is a Nucleic Acid Amplification assay intended for the  qualitative detection of nucleic acid from SARS-CoV-2 in nasopharyngeal  swabs. Results are for the presumptive identification of SARS-CoV-2 RNA.    Positive results are indicative of infection with SARS-CoV-2, the virus  causing COVID-19, but do not rule out bacterial infection or co-infection  with other viruses. Laboratories within the [place] and its  territories are required to report all positive results to the appropriate  public health authorities. Negative results do not preclude SARS-CoV-2  infection and should not be used as the sole basis for treatment or other  patient management decisions. Negative results must be combined with  clinical observations, patient history, and epidemiological information.  This test has not been FDA cleared or approved.    This test has been authorized by FDA under an Emergency Use Authorization  (EUA). This test is only authorized for the duration of time the  declaration that circumstances exist justifying the authorization of the  emergency use of an in vitro diagnostic tests for detection of SARS-CoV-2  virus and/or diagnosis of COVID-19 infection under section 564(b)(1) of  the Act, 21 U.S.C. 360bbb-3(b)(1), unless the authorization is terminated  or revoked sooner. The test has been validated but independent review by FDA  and CLIA is pending.    Test performed using [employer]: This RT-PCR assay targets N2,  a region unique to SARS-CoV-2. A conserved region in the E-gene was chosen  for pan-Sarbecovirus detection which  includes SARS-CoV-2.    According to CMS-2020-01-R, this platform meets the definition of high-throughput technology.    TSH, 3rd generation [[id number]]  (Abnormal) Collected: 04/03/24 0221    Lab Status: Final result Specimen: Blood from Arm, Right Updated: 04/03/24 0259     TSH 3RD GENERATON 7.296 uIU/mL     HS Troponin 0hr (reflex protocol) [[id number]]  (Normal) Collected: 04/03/24 0221    Lab Status: Final result Specimen: Blood from Arm, Right Updated: 04/03/24 0250     hs TnI 0hr 3 ng/L     Comprehensive metabolic panel [[id number]] Collected: 04/03/24 0221    Lab Status: Final result Specimen: Blood from Arm, Right Updated: 04/03/24 0245     Sodium 138 mmol/L      Potassium 3.9 mmol/L      Chloride 104 mmol/L      CO2 26 mmol/L      ANION GAP 8 mmol/L      BUN 22 mg/dL      Creatinine 0.69 mg/dL      Glucose 125 mg/dL      Calcium 9.8 mg/dL      AST 19 U/L      ALT 19 U/L      Alkaline Phosphatase 42 U/L      Total Protein 7.1 g/dL      Albumin 4.2 g/dL      Total Bilirubin 0.23 mg/dL      eGFR 91 ml/min/1.73sq m     Narrative:      National Kidney Disease Foundation guidelines for Chronic Kidney Disease (CKD):     Stage 1 with normal or high GFR (GFR > 90 mL/min/1.73 square meters)    Stage 2 Mild CKD (GFR = 60-89 mL/min/1.73 square meters)    Stage 3A Moderate CKD (GFR = 45-59 mL/min/1.73 square meters)    Stage 3B Moderate CKD (GFR = 30-44 mL/min/1.73 square meters)    Stage 4 Severe CKD (GFR = 15-29 mL/min/1.73 square meters)    Stage 5 End Stage CKD (GFR <15 mL/min/1.73 square meters)  Note: GFR calculation is accurate only with a steady state creatinine    CBC and differential [[id number]]  (Abnormal) Collected: 04/03/24 0221    Lab Status: Final result Specimen: Blood from Arm, Right Updated: 04/03/24 0227     WBC 5.76 Thousand/uL      RBC 3.99 Million/uL      Hemoglobin 12.8 g/dL      Hematocrit 38.9 %      MCV 98 fL      MCH 32.1 pg      MCHC 32.9 g/dL      RDW 12.0 %      MPV 9.6 fL      Platelets 265  Thousands/uL      nRBC 0 /100 WBCs      Neutrophils Relative 40 %      Immature Grans % 1 %      Lymphocytes Relative 49 %      Monocytes Relative 7 %      Eosinophils Relative 2 %      Basophils Relative 1 %      Neutrophils Absolute 2.32 Thousands/µL      Absolute Immature Grans 0.05 Thousand/uL      Absolute Lymphocytes 2.79 Thousands/µL      Absolute Monocytes 0.42 Thousand/µL      Eosinophils Absolute 0.13 Thousand/µL      Basophils Absolute 0.05 Thousands/µL                    Imaging Studies:   Direct to CT: Yes  TRAUMA - CT head wo contrast   Final Result by Josh Alejandro MD (04/03 0247)      No acute intracranial abnormality.                  Workstation performed: [id number]         TRAUMA - CT abdomen pelvis w contrast   Final Result by Josh Alejandro MD (04/03 0305)      No evidence of solid organ injury.      No acute intra-abdominal abnormality. No free air or free fluid.      Scattered colonic diverticulosis with no inflammatory changes present to suggest acute diverticulitis.      Workstation performed: [id number]               Procedures  ECG 12 Lead Documentation Only    Date/Time: 4/3/2024 6:24 AM    Performed by: Diego Black MD  Authorized by: Diego Black MD    Indications / Diagnosis:  63  ECG reviewed by me, the ED Provider: yes    Patient location:  ED  Previous ECG:     Previous ECG:  Compared to current    Similarity:  No change  Interpretation:     Interpretation: abnormal    Rate:     ECG rate:  63    ECG rate assessment: normal    Rhythm:     Rhythm: sinus rhythm    Ectopy:     Ectopy: none    QRS:     QRS axis:  Right    QRS intervals:  Normal  Conduction:     Conduction: normal    ST segments:     ST segments:  Normal  T waves:     T waves: normal             ED Course  ED Course as of 04/03/24 0625   Wed Apr 03, 2024   0229 No evidence of chest trauma.   0513 Ambulatory upon discharge in no distress, improved symptoms.   0514 TSH 3RD  MADYSON(!): 7.296  Informed of slightly elevated TSH.           Medical Decision Making  I reviewed the patient's medical chart, PMHx, prior encounters, medications.    My independent interpretation of ECG demonstrated: Normal sinus rhythm, regular axis.    My DDx includes: Arrhythmia, ACS, electrolyte abnormality, hypoglycemia, syncope, consider PE however the patient has normal vital signs, no leg pain or swelling, no history of PE or DVT.    Cardiac workup ordered.  Workup was negative.  No electrolyte abnormality, ECG was unremarkable.  Delta troponin and delta EKG were also performed and negative.  Patient states that besides feeling a little tired feels significantly improved.  She was discharged with strict return precautions, discussed follow-up with PCP regarding abnormal TSH, syncope.        Amount and/or Complexity of Data Reviewed  Labs: ordered. Decision-making details documented in ED Course.  Radiology: ordered.    Risk  Prescription drug management.                Disposition  [facility] Transfer: No  Final diagnoses:   Abnormal TSH   Syncope   Nausea & vomiting     Time reflects when diagnosis was documented in both MDM as applicable and the Disposition within this note       Time User Action Codes Description Comment    4/3/2024  5:05 AM Diego Black [R79.89] Abnormal TSH     4/3/2024  5:05 AM Diego Black [R55] Syncope     4/3/2024  5:05 AM Diego Black [R11.2] Nausea & vomiting           ED Disposition       ED Disposition   Discharge    Condition   Stable    Date/Time   Wed Apr 3, 2024  5:06 AM    Comment   Mrea Encinas discharge to home/self care.                   Follow-up Information       Follow up With Specialties Details Why Contact Info    Crystal Horton PA-C Physician Assistant Call  For re-evaluation [address] [facility] [place] [id number] [phone]            Discharge Medication List as of 4/3/2024  5:07 AM        CONTINUE these medications  which have NOT CHANGED    Details   Cranberry (CRANBEREX PO) Take by mouth, Historical Med      lisinopril (ZESTRIL) 20 mg tablet Take 1 tablet (20 mg total) by mouth daily, Starting Thu 2/1/2024, Normal      multivitamin (THERAGRAN) TABS Take 1 tablet by mouth daily, Historical Med      nitrofurantoin (MACROBID) 100 mg capsule Take 1 capsule (100 mg total) by mouth 2 (two) times a day for 5 days, Starting Tue 4/2/2024, Until Sun 4/7/2024, Normal      pantoprazole (PROTONIX) 40 mg tablet Take 1 tablet (40 mg total) by mouth daily, Starting u 10/26/2023, Normal      simvastatin (ZOCOR) 40 mg tablet Take 1 tablet (40 mg total) by mouth every evening, Starting u 10/26/2023, Normal      Turmeric 400 MG CAPS Take 800 mg by mouth 2 (two) times a day, Historical Med           No discharge procedures on file.    PDMP Review       None            ED Provider  Electronically Signed by           Diego Black MD  04/03/24 0624       Diego Black MD  04/03/24 0625

## 2024-04-03 NOTE — DISCHARGE INSTRUCTIONS
Please follow up with PCP regarding elevated TSH.    Please follow up with PCP regarding tonight's events.    Thank you.

## 2024-04-11 ENCOUNTER — OFFICE VISIT (OUTPATIENT)
Dept: FAMILY MEDICINE CLINIC | Facility: CLINIC | Age: 64
End: 2024-04-11
Payer: COMMERCIAL

## 2024-04-11 VITALS
OXYGEN SATURATION: 98 % | BODY MASS INDEX: 24.17 KG/M2 | DIASTOLIC BLOOD PRESSURE: 70 MMHG | HEART RATE: 72 BPM | TEMPERATURE: 97.9 F | HEIGHT: 67 IN | RESPIRATION RATE: 16 BRPM | WEIGHT: 154 LBS | SYSTOLIC BLOOD PRESSURE: 114 MMHG

## 2024-04-11 DIAGNOSIS — I10 ESSENTIAL HYPERTENSION: Primary | ICD-10-CM

## 2024-04-11 DIAGNOSIS — Z11.59 ENCOUNTER FOR SCREENING FOR OTHER VIRAL DISEASES: ICD-10-CM

## 2024-04-11 DIAGNOSIS — R55 SYNCOPE, UNSPECIFIED SYNCOPE TYPE: ICD-10-CM

## 2024-04-11 DIAGNOSIS — E03.9 ACQUIRED HYPOTHYROIDISM: ICD-10-CM

## 2024-04-11 PROCEDURE — 99214 OFFICE O/P EST MOD 30 MIN: CPT | Performed by: PHYSICIAN ASSISTANT

## 2024-04-11 RX ORDER — LISINOPRIL 10 MG/1
10 TABLET ORAL DAILY
Qty: 30 TABLET | Refills: 0 | Status: SHIPPED | OUTPATIENT
Start: 2024-04-11

## 2024-04-11 RX ORDER — LEVOTHYROXINE SODIUM 0.03 MG/1
25 TABLET ORAL
Qty: 90 TABLET | Refills: 0 | Status: SHIPPED | OUTPATIENT
Start: 2024-04-11

## 2024-04-11 NOTE — PROGRESS NOTES
"Name: Mera Encinas      : 1960      MRN: [id number]  Encounter Provider: Crystal Horton PA-C  Encounter Date: 2024   Encounter department: [facility]    Assessment & Plan     1. Essential hypertension  Assessment & Plan:  Pt reports feeling fatigued on lisinopril 20 mg. Will decrease to 10 mg. Pt to monitor BP at home, call me if consistently >140/90.    Orders:  -     lisinopril (ZESTRIL) 10 mg tablet; Take 1 tablet (10 mg total) by mouth daily    2. Acquired hypothyroidism  Assessment & Plan:  Initiate treatment with levothyroxine. Repeat TSH in 3 months or sooner PRN.    Orders:  -     levothyroxine (Synthroid) 25 mcg tablet; Take 1 tablet (25 mcg total) by mouth daily in the early morning    3. Syncope, unspecified syncope type  Assessment & Plan:  Will evaluate further with holter monitor. Encouraged to increase water intake, stay well hydrated, consider avoiding alcohol.     Orders:  -     Holter monitor; Future; Expected date: 2024    4. Encounter for screening for other viral diseases  -     Hepatitis C antibody; Future        Depression Screening and Follow-up Plan: Patient was screened for depression during today's encounter. They screened negative with a PHQ-2 score of 0.        Magdalene Zaidi is here today for ER follow up from syncopal episode. Was drinking vodka earlier in afternoon/evening, got up to go to the bathroom when event occurred. Pt states was not \"drunk,\" states drank her \"normal amount.\"  Testing at ER was unrevealing other than incidental finding of elevated TSH.  Pt notes that lisinopril is making her fatigued, wondering if has another option for HTN treatment.      Review of Systems   Constitutional:  Negative for chills and fever.   HENT:  Negative for ear pain and sore throat.    Eyes:  Negative for pain and visual disturbance.   Respiratory:  Negative for cough and shortness of breath.    Cardiovascular:  Negative for chest pain and " "palpitations.   Gastrointestinal:  Negative for abdominal pain and vomiting.   Genitourinary:  Negative for dysuria and hematuria.   Musculoskeletal:  Negative for arthralgias and back pain.   Skin:  Negative for color change and rash.   Neurological:  Negative for seizures and syncope.   All other systems reviewed and are negative.      Current Outpatient Medications on File Prior to Visit   Medication Sig    Cranberry (CRANBEREX PO) Take by mouth    multivitamin (THERAGRAN) TABS Take 1 tablet by mouth daily    pantoprazole (PROTONIX) 40 mg tablet Take 1 tablet (40 mg total) by mouth daily    simvastatin (ZOCOR) 40 mg tablet Take 1 tablet (40 mg total) by mouth every evening    Turmeric 400 MG CAPS Take 800 mg by mouth 2 (two) times a day    [DISCONTINUED] lisinopril (ZESTRIL) 20 mg tablet Take 1 tablet (20 mg total) by mouth daily       Objective     /70 (BP Location: Left arm, Patient Position: Sitting, Cuff Size: Adult)   Pulse 72   Temp 97.9 °F (36.6 °C) (Temporal)   Resp 16   Ht 5' 7\" (1.702 m)   Wt 69.9 kg (154 lb)   SpO2 98%   BMI 24.12 kg/m²     Physical Exam  Vitals reviewed.   Constitutional:       General: She is not in acute distress.     Appearance: She is well-developed. She is not diaphoretic.   HENT:      Head: Normocephalic and atraumatic.      Right Ear: Hearing, tympanic membrane, ear canal and external ear normal.      Left Ear: Hearing, tympanic membrane, ear canal and external ear normal.      Nose: Nose normal.      Mouth/Throat:      Mouth: Mucous membranes are moist.      Pharynx: Oropharynx is clear. Uvula midline. No oropharyngeal exudate.   Eyes:      General: No scleral icterus.        Right eye: No discharge.         Left eye: No discharge.      Conjunctiva/sclera: Conjunctivae normal.   Neck:      Thyroid: No thyromegaly.      Vascular: No carotid bruit.   Cardiovascular:      Rate and Rhythm: Normal rate and regular rhythm.      Heart sounds: Normal heart sounds. No " murmur heard.  Pulmonary:      Effort: Pulmonary effort is normal. No respiratory distress.      Breath sounds: Normal breath sounds. No wheezing.   Abdominal:      General: Bowel sounds are normal. There is no distension.      Palpations: Abdomen is soft. There is no mass.      Tenderness: There is no abdominal tenderness. There is no guarding or rebound.   Musculoskeletal:         General: No tenderness. Normal range of motion.      Cervical back: Neck supple.   Lymphadenopathy:      Cervical: No cervical adenopathy.   Skin:     General: Skin is warm and dry.      Findings: No erythema or rash.   Neurological:      Mental Status: She is alert and oriented to person, place, and time.   Psychiatric:         Behavior: Behavior normal.         Thought Content: Thought content normal.         Judgment: Judgment normal.       Crystal Horton PA-C

## 2024-04-11 NOTE — ASSESSMENT & PLAN NOTE
Will evaluate further with holter monitor. Encouraged to increase water intake, stay well hydrated, consider avoiding alcohol.

## 2024-04-11 NOTE — ASSESSMENT & PLAN NOTE
Pt reports feeling fatigued on lisinopril 20 mg. Will decrease to 10 mg. Pt to monitor BP at home, call me if consistently >140/90.

## 2024-04-17 ENCOUNTER — ANESTHESIA EVENT (OUTPATIENT)
Dept: PERIOP | Facility: HOSPITAL | Age: 64
End: 2024-04-17

## 2024-04-17 ENCOUNTER — HOSPITAL ENCOUNTER (OUTPATIENT)
Dept: PERIOP | Facility: HOSPITAL | Age: 64
Setting detail: OUTPATIENT SURGERY
Discharge: HOME | End: 2024-04-17
Attending: INTERNAL MEDICINE
Payer: COMMERCIAL

## 2024-04-17 ENCOUNTER — ANESTHESIA (OUTPATIENT)
Dept: PERIOP | Facility: HOSPITAL | Age: 64
End: 2024-04-17

## 2024-04-17 VITALS
BODY MASS INDEX: 22.51 KG/M2 | HEART RATE: 74 BPM | HEIGHT: 69 IN | WEIGHT: 152 LBS | RESPIRATION RATE: 16 BRPM | SYSTOLIC BLOOD PRESSURE: 130 MMHG | OXYGEN SATURATION: 98 % | TEMPERATURE: 97.2 F | DIASTOLIC BLOOD PRESSURE: 63 MMHG

## 2024-04-17 DIAGNOSIS — Z12.11 SCREENING FOR COLON CANCER: ICD-10-CM

## 2024-04-17 PROCEDURE — 45385 COLONOSCOPY W/LESION REMOVAL: CPT | Performed by: INTERNAL MEDICINE

## 2024-04-17 PROCEDURE — 88305 TISSUE EXAM BY PATHOLOGIST: CPT | Performed by: SPECIALIST

## 2024-04-17 RX ORDER — SODIUM CHLORIDE, SODIUM LACTATE, POTASSIUM CHLORIDE, CALCIUM CHLORIDE 600; 310; 30; 20 MG/100ML; MG/100ML; MG/100ML; MG/100ML
50 INJECTION, SOLUTION INTRAVENOUS CONTINUOUS
Status: DISCONTINUED | OUTPATIENT
Start: 2024-04-17 | End: 2024-04-21 | Stop reason: HOSPADM

## 2024-04-17 RX ORDER — HYDROMORPHONE HCL IN WATER/PF 6 MG/30 ML
0.2 PATIENT CONTROLLED ANALGESIA SYRINGE INTRAVENOUS
Status: DISCONTINUED | OUTPATIENT
Start: 2024-04-17 | End: 2024-04-17 | Stop reason: HOSPADM

## 2024-04-17 RX ORDER — FENTANYL CITRATE/PF 50 MCG/ML
25 SYRINGE (ML) INJECTION
Status: DISCONTINUED | OUTPATIENT
Start: 2024-04-17 | End: 2024-04-17 | Stop reason: HOSPADM

## 2024-04-17 RX ORDER — PROPOFOL 10 MG/ML
INJECTION, EMULSION INTRAVENOUS AS NEEDED
Status: DISCONTINUED | OUTPATIENT
Start: 2024-04-17 | End: 2024-04-17

## 2024-04-17 RX ORDER — ONDANSETRON 2 MG/ML
4 INJECTION INTRAMUSCULAR; INTRAVENOUS ONCE AS NEEDED
Status: DISCONTINUED | OUTPATIENT
Start: 2024-04-17 | End: 2024-04-17 | Stop reason: HOSPADM

## 2024-04-17 RX ORDER — LIDOCAINE HYDROCHLORIDE 20 MG/ML
INJECTION, SOLUTION EPIDURAL; INFILTRATION; INTRACAUDAL; PERINEURAL AS NEEDED
Status: DISCONTINUED | OUTPATIENT
Start: 2024-04-17 | End: 2024-04-17

## 2024-04-17 RX ORDER — GLYCOPYRROLATE 0.2 MG/ML
INJECTION INTRAMUSCULAR; INTRAVENOUS AS NEEDED
Status: DISCONTINUED | OUTPATIENT
Start: 2024-04-17 | End: 2024-04-17

## 2024-04-17 RX ORDER — LIDOCAINE HYDROCHLORIDE 10 MG/ML
0.5 INJECTION, SOLUTION EPIDURAL; INFILTRATION; INTRACAUDAL; PERINEURAL ONCE AS NEEDED
Status: DISCONTINUED | OUTPATIENT
Start: 2024-04-17 | End: 2024-04-21 | Stop reason: HOSPADM

## 2024-04-17 RX ORDER — PROPOFOL 10 MG/ML
INJECTION, EMULSION INTRAVENOUS CONTINUOUS PRN
Status: DISCONTINUED | OUTPATIENT
Start: 2024-04-17 | End: 2024-04-17

## 2024-04-17 RX ADMIN — PROPOFOL 100 MCG/KG/MIN: 10 INJECTION, EMULSION INTRAVENOUS at 08:19

## 2024-04-17 RX ADMIN — PROPOFOL 100 MG: 10 INJECTION, EMULSION INTRAVENOUS at 08:15

## 2024-04-17 RX ADMIN — SODIUM CHLORIDE, SODIUM LACTATE, POTASSIUM CHLORIDE, AND CALCIUM CHLORIDE: .6; .31; .03; .02 INJECTION, SOLUTION INTRAVENOUS at 08:02

## 2024-04-17 RX ADMIN — GLYCOPYRROLATE 0.2 MCG: 0.2 INJECTION, SOLUTION INTRAMUSCULAR; INTRAVENOUS at 08:20

## 2024-04-17 RX ADMIN — SODIUM CHLORIDE, SODIUM LACTATE, POTASSIUM CHLORIDE, AND CALCIUM CHLORIDE 50 ML/HR: .6; .31; .03; .02 INJECTION, SOLUTION INTRAVENOUS at 07:24

## 2024-04-17 RX ADMIN — LIDOCAINE HYDROCHLORIDE 50 MG: 20 INJECTION, SOLUTION EPIDURAL; INFILTRATION; INTRACAUDAL; PERINEURAL at 08:15

## 2024-04-17 NOTE — H&P
History and Physical - [facility]  Mera Encinas 64 y.o. female MRN: [id number]                  HPI: Mera Encinas is a 64 y.o. year old female who presents for colon cancer screening.      REVIEW OF SYSTEMS: Per the HPI, and otherwise unremarkable.    Historical Information   Past Medical History:   Diagnosis Date    GERD (gastroesophageal reflux disease)     Hyperlipidemia      Past Surgical History:   Procedure Laterality Date    BLADDER SURGERY      ESOPHAGOGASTRODUODENOSCOPY  2013    Diagnostic    HYSTERECTOMY  2012    [place] LAPS REPAIR HERNIA EXCEPT INCAL/INGUN REDUCIBLE N/A 2020    Procedure: REPAIR HERNIA VENTRAL & umbilical LAPAROSCOPIC;  Surgeon: Dayton Heredia DO;  Location: [facility];  Service: General    TETRALOGY OF FALLOT REPAIR W/ PULMONARY ATRESIA REPAIR      Complete     Social History   Social History     Substance and Sexual Activity   Alcohol Use Yes    Comment: rarely     Social History     Substance and Sexual Activity   Drug Use No     Social History     Tobacco Use   Smoking Status Former    Current packs/day: 0.00    Types: Cigarettes    Quit date: 2019    Years since quittin.9   Smokeless Tobacco Never     Family History   Problem Relation Age of Onset    No Known Problems Mother     No Known Problems Father     No Known Problems Sister     No Known Problems Maternal Grandmother     No Known Problems Maternal Grandfather     No Known Problems Paternal Grandmother     No Known Problems Paternal Grandfather     No Known Problems Sister     No Known Problems Sister     No Known Problems Maternal Aunt     No Known Problems Maternal Aunt        Meds/Allergies       Current Outpatient Medications:     Cranberry (CRANBEREX PO)    levothyroxine (Synthroid) 25 mcg tablet    lisinopril (ZESTRIL) 10 mg tablet    multivitamin (THERAGRAN) TABS    pantoprazole (PROTONIX) 40 mg tablet    simvastatin (ZOCOR) 40 mg tablet    Turmeric 400 MG CAPS    Current  "Facility-Administered Medications:     lactated ringers infusion, 50 mL/hr, Intravenous, Continuous, 50 mL/hr at 04/17/24 0724    No Known Allergies    Objective     /66   Pulse 69   Temp (!) 96.5 °F (35.8 °C) (Tympanic)   Resp 16   Ht 5' 9\" (1.753 m)   Wt 68.9 kg (152 lb)   SpO2 99%   BMI 22.45 kg/m²       PHYSICAL EXAM    Gen: NAD  Head: NCAT  CV: RRR  CHEST: Clear  ABD: soft, NT/ND  EXT: no edema      ASSESSMENT/PLAN:  This is a 64 y.o. year old female here for colonoscopy, and she is stable and optimized for her procedure.        "

## 2024-04-17 NOTE — ANESTHESIA PREPROCEDURE EVALUATION
"Procedure:  COLONOSCOPY    Relevant Problems   CARDIO   (+) Essential hypertension   (+) Other hyperlipidemia      ENDO   (+) Acquired hypothyroidism   (+) Subclinical hypothyroidism      GI/HEPATIC   (+) Gastroesophageal reflux disease without esophagitis      MUSCULOSKELETAL   (+) Chronic bilateral low back pain without sciatica   (+) Sciatica      NEURO/PSYCH   (+) Anxiety   (+) Chronic bilateral low back pain without sciatica      CAD/PCI/MI/CHF -- denies  COPD/ASTHMA/YARIEL -- denies  PROBS WITH PRIOR ANESTHESIA -- denies  NPO STATUS CONFIRMED    Lab Results   Component Value Date    SODIUM 138 04/03/2024    K 3.9 04/03/2024    BUN 22 04/03/2024    CREATININE 0.69 04/03/2024    EGFR 91 04/03/2024     Lab Results   Component Value Date    HGBA1C 6.0 (H) 01/11/2023       Lab Results   Component Value Date    HGB 12.8 04/03/2024    HGB 13.6 01/11/2023    HGB 14.0 04/06/2021     04/03/2024     01/11/2023     04/06/2021     Lab Results   Component Value Date    WBC 5.76 04/03/2024       Lab Results   Component Value Date    CREATININE 0.69 04/03/2024    CREATININE 0.62 01/11/2023    CREATININE 0.62 04/06/2021       No results found for: \"INR\"  No results found for: \"PTT\"    No results found for: \"LACTATE\"                            Physical Exam    Airway    Mallampati score: II  TM Distance: >3 FB       Dental   No notable dental hx     Cardiovascular      Pulmonary      Other Findings  post-pubertal.      Anesthesia Plan  ASA Score- 2     Anesthesia Type- IV sedation with anesthesia with ASA Monitors.         Additional Monitors:     Airway Plan:     Comment: I, Virgil Perez M.D., have personally seen and evaluated the patient prior to anesthetic care.  I have reviewed the pre-anesthetic record, and other medical records if appropriate to the anesthetic care.  If a CRNA is involved in the case, I have reviewed the CRNA assessment, if present, and agree.      Risks/benefits and " alternatives discussed with patient including possible PONV, sore throat, and possibility of rare anesthetic and surgical emergencies.  .       Plan Factors-Exercise tolerance (METS): >4 METS.    Chart reviewed. EKG reviewed. Imaging results reviewed. Existing labs reviewed. Patient summary reviewed.      Patient instructed to abstain from smoking on day of procedure.     Obstructive sleep apnea risk education given perioperatively.        Induction-     Postoperative Plan-     Informed Consent- Anesthetic plan and risks discussed with patient.  I personally reviewed this patient with the CRNA. Discussed and agreed on the Anesthesia Plan with the CRNA..

## 2024-04-22 PROCEDURE — 88305 TISSUE EXAM BY PATHOLOGIST: CPT | Performed by: SPECIALIST

## 2024-04-22 NOTE — RESULT ENCOUNTER NOTE
I called and spoke to the patient regarding biopsy results.  Discussed that I do not show polyp or any precancerous tissue.  We can repeat next colonoscopy in 10 years.  Changed recommendations on procedure report. Enter 10 year colonoscopy recall.

## 2024-04-25 ENCOUNTER — LAB (OUTPATIENT)
Dept: LAB | Facility: HOSPITAL | Age: 64
End: 2024-04-25
Payer: COMMERCIAL

## 2024-04-25 ENCOUNTER — HOSPITAL ENCOUNTER (OUTPATIENT)
Dept: NON INVASIVE DIAGNOSTICS | Facility: HOSPITAL | Age: 64
Discharge: HOME | End: 2024-04-25
Payer: COMMERCIAL

## 2024-04-25 DIAGNOSIS — R73.9 HYPERGLYCEMIA: ICD-10-CM

## 2024-04-25 DIAGNOSIS — R55 SYNCOPE, UNSPECIFIED SYNCOPE TYPE: ICD-10-CM

## 2024-04-25 DIAGNOSIS — E78.49 OTHER HYPERLIPIDEMIA: ICD-10-CM

## 2024-04-25 DIAGNOSIS — E03.8 SUBCLINICAL HYPOTHYROIDISM: ICD-10-CM

## 2024-04-25 DIAGNOSIS — I10 ESSENTIAL HYPERTENSION: ICD-10-CM

## 2024-04-25 LAB
ALBUMIN SERPL BCP-MCNC: 4.3 G/DL (ref 3.5–5)
ALP SERPL-CCNC: 40 U/L (ref 34–104)
ALT SERPL W P-5'-P-CCNC: 20 U/L (ref 7–52)
ANION GAP SERPL CALCULATED.3IONS-SCNC: 5 MMOL/L (ref 4–13)
AST SERPL W P-5'-P-CCNC: 20 U/L (ref 13–39)
BILIRUB SERPL-MCNC: 0.47 MG/DL (ref 0.2–1)
BUN SERPL-MCNC: 17 MG/DL (ref 5–25)
CALCIUM SERPL-MCNC: 9.9 MG/DL (ref 8.4–10.2)
CHLORIDE SERPL-SCNC: 100 MMOL/L (ref 96–108)
CHOLEST SERPL-MCNC: 183 MG/DL
CO2 SERPL-SCNC: 31 MMOL/L (ref 21–32)
CREAT SERPL-MCNC: 0.65 MG/DL (ref 0.6–1.3)
ERYTHROCYTE [DISTWIDTH] IN BLOOD BY AUTOMATED COUNT: 11.9 % (ref 11.6–15.1)
EST. AVERAGE GLUCOSE BLD GHB EST-MCNC: 134 MG/DL
GFR SERPL CREATININE-BSD FRML MDRD: 93 ML/MIN/1.73SQ M
GLUCOSE P FAST SERPL-MCNC: 107 MG/DL (ref 65–99)
HBA1C MFR BLD: 6.3 %
HCT VFR BLD AUTO: 40.4 % (ref 34.8–46.1)
HDLC SERPL-MCNC: 59 MG/DL
HGB BLD-MCNC: 13.4 G/DL (ref 11.5–15.4)
LDLC SERPL CALC-MCNC: 112 MG/DL (ref 0–100)
MCH RBC QN AUTO: 32.2 PG (ref 26.8–34.3)
MCHC RBC AUTO-ENTMCNC: 33.2 G/DL (ref 31.4–37.4)
MCV RBC AUTO: 97 FL (ref 82–98)
PLATELET # BLD AUTO: 279 THOUSANDS/UL (ref 149–390)
PMV BLD AUTO: 9.7 FL (ref 8.9–12.7)
POTASSIUM SERPL-SCNC: 4.4 MMOL/L (ref 3.5–5.3)
PROT SERPL-MCNC: 7.2 G/DL (ref 6.4–8.4)
RBC # BLD AUTO: 4.16 MILLION/UL (ref 3.81–5.12)
SODIUM SERPL-SCNC: 136 MMOL/L (ref 135–147)
TRIGL SERPL-MCNC: 62 MG/DL
TSH SERPL DL<=0.05 MIU/L-ACNC: 2.91 UIU/ML (ref 0.45–4.5)
WBC # BLD AUTO: 5.13 THOUSAND/UL (ref 4.31–10.16)

## 2024-04-25 PROCEDURE — 83036 HEMOGLOBIN GLYCOSYLATED A1C: CPT

## 2024-04-25 PROCEDURE — 80061 LIPID PANEL: CPT

## 2024-04-25 PROCEDURE — 93225 XTRNL ECG REC<48 HRS REC: CPT

## 2024-04-25 PROCEDURE — 80053 COMPREHEN METABOLIC PANEL: CPT

## 2024-04-25 PROCEDURE — 84443 ASSAY THYROID STIM HORMONE: CPT

## 2024-04-25 PROCEDURE — 36415 COLL VENOUS BLD VENIPUNCTURE: CPT

## 2024-04-25 PROCEDURE — 93226 XTRNL ECG REC<48 HR SCAN A/R: CPT

## 2024-04-25 PROCEDURE — 85027 COMPLETE CBC AUTOMATED: CPT

## 2024-04-29 PROCEDURE — 93227 XTRNL ECG REC<48 HR R&I: CPT | Performed by: INTERNAL MEDICINE

## 2024-05-01 DIAGNOSIS — I10 ESSENTIAL HYPERTENSION: ICD-10-CM

## 2024-05-01 PROBLEM — N30.01 ACUTE CYSTITIS WITH HEMATURIA: Status: RESOLVED | Noted: 2024-03-29 | Resolved: 2024-05-01

## 2024-05-03 RX ORDER — LISINOPRIL 10 MG/1
10 TABLET ORAL DAILY
Qty: 90 TABLET | Refills: 1 | Status: SHIPPED | OUTPATIENT
Start: 2024-05-03

## 2024-06-20 ENCOUNTER — OFFICE VISIT (OUTPATIENT)
Dept: INTERNAL MEDICINE CLINIC | Facility: CLINIC | Age: 64
End: 2024-06-20
Payer: COMMERCIAL

## 2024-06-20 VITALS
WEIGHT: 153.3 LBS | BODY MASS INDEX: 22.71 KG/M2 | TEMPERATURE: 97.4 F | SYSTOLIC BLOOD PRESSURE: 164 MMHG | HEART RATE: 74 BPM | DIASTOLIC BLOOD PRESSURE: 82 MMHG | OXYGEN SATURATION: 98 % | HEIGHT: 69 IN

## 2024-06-20 DIAGNOSIS — Z78.0 POSTMENOPAUSAL: ICD-10-CM

## 2024-06-20 DIAGNOSIS — E78.49 OTHER HYPERLIPIDEMIA: ICD-10-CM

## 2024-06-20 DIAGNOSIS — I10 ESSENTIAL HYPERTENSION: Primary | ICD-10-CM

## 2024-06-20 DIAGNOSIS — R73.9 HYPERGLYCEMIA: ICD-10-CM

## 2024-06-20 PROCEDURE — 99214 OFFICE O/P EST MOD 30 MIN: CPT | Performed by: FAMILY MEDICINE

## 2024-06-20 RX ORDER — VALSARTAN 40 MG/1
40 TABLET ORAL DAILY
Qty: 90 TABLET | Refills: 1 | Status: SHIPPED | OUTPATIENT
Start: 2024-06-20

## 2024-06-21 NOTE — PROGRESS NOTES
Ambulatory Visit  Name: Mera Encinas      : 1960      MRN: [id number]  Encounter Provider: Kaylee Byrd MD  Encounter Date: 2024   Encounter department: [facility]    Assessment & Plan   1. Essential hypertension  -     CBC and differential; Future  -     Comprehensive metabolic panel; Future  -     valsartan (DIOVAN) 40 mg tablet; Take 1 tablet (40 mg total) by mouth daily  2. Hyperglycemia  -     Comprehensive metabolic panel; Future  -     Hemoglobin A1C; Future  3. Other hyperlipidemia  -     Comprehensive metabolic panel; Future  -     Lipid panel; Future  -     TSH, 3rd generation; Future  4. Postmenopausal  -     DXA bone density spine hip and pelvis; Future; Expected date: 2024    Orders and recommendations as noted above.  Discussed with her the previous episode of syncope.  Potential causes discussed with her.  Blood pressure remains elevated.  Will start low-dose valsartan and titrate as needed.  Watch for any orthostatic symptoms.  Previous laboratory testing reviewed with her.  Continue with the levothyroxine.  Will check this with upcoming laboratory testing and adjust dose if needed.  Will be due for laboratory testing around August or September.  Continue with the Protonix for the reflux symptoms.  Continue with the simvastatin.  Will follow lipid panel and adjust dose if needed.  Recommend mammograms yearly.  Recommend bone densities every 2 years.  Up-to-date on colonoscopy.  Recommend flu shot in the fall.  Will have her follow-up in about 3 to 5 months or sooner if needed.  Recommend following blood pressure at home if possible.     History of Present Illness     She presents to establish as a new patient.  She has had some issues with her blood pressure.  Blood pressure had been elevated and was placed on lisinopril 20 mg.  A few weeks later she had gotten up at night and had a syncopal episode.  She was evaluated through the emergency  room for this.  She admits that she had been drinking the night before but does not feel that that was what caused the symptoms.  Feels that the blood pressure medication was too high for her at that point.  Has stopped the lisinopril completely.  Has followed her blood pressure at home and at work.  Blood pressure is variable.  Not usually very high.  Tolerating her simvastatin without difficulty.  Denies any significant muscle aches or weakness.  Does have some chronic back issues.  Tolerating the levothyroxine.  Energy level has been remained relatively stable.  Is up-to-date on her colonoscopy.        Review of Systems   Constitutional:  Negative for activity change, appetite change, chills and fever.   HENT:  Negative for congestion and rhinorrhea.    Eyes:  Negative for visual disturbance.   Respiratory:  Negative for chest tightness and shortness of breath.    Cardiovascular:  Negative for chest pain and palpitations.   Gastrointestinal:  Negative for abdominal pain, blood in stool, diarrhea, nausea and vomiting.   Endocrine: Negative for polydipsia, polyphagia and polyuria.   Genitourinary:  Negative for dysuria, frequency and urgency.   Musculoskeletal:  Positive for arthralgias and back pain. Negative for gait problem.   Skin:  Negative for color change.   Neurological:  Negative for dizziness and headaches.   Hematological:  Does not bruise/bleed easily.   Psychiatric/Behavioral:  Negative for confusion and sleep disturbance. The patient is not nervous/anxious.      Medical History Reviewed by provider this encounter:       Past Medical History   Past Medical History:   Diagnosis Date    GERD (gastroesophageal reflux disease)     Hyperlipidemia      Past Surgical History:   Procedure Laterality Date    BLADDER SURGERY      ESOPHAGOGASTRODUODENOSCOPY  2013    Diagnostic    HYSTERECTOMY  2012    [place] LAPS REPAIR HERNIA EXCEPT INCAL/INGUN REDUCIBLE N/A 09/18/2020    Procedure: REPAIR HERNIA VENTRAL & umbilical  LAPAROSCOPIC;  Surgeon: Dayton Heredia DO;  Location: [facility];  Service: General     Family History   Problem Relation Age of Onset    No Known Problems Mother     No Known Problems Father     No Known Problems Sister     No Known Problems Maternal Grandmother     No Known Problems Maternal Grandfather     No Known Problems Paternal Grandmother     No Known Problems Paternal Grandfather     No Known Problems Sister     No Known Problems Sister     No Known Problems Maternal Aunt     No Known Problems Maternal Aunt      Current Outpatient Medications on File Prior to Visit   Medication Sig Dispense Refill    Cranberry (CRANBEREX PO) Take by mouth      levothyroxine (Synthroid) 25 mcg tablet Take 1 tablet (25 mcg total) by mouth daily in the early morning 90 tablet 0    multivitamin (THERAGRAN) TABS Take 1 tablet by mouth daily      pantoprazole (PROTONIX) 40 mg tablet Take 1 tablet (40 mg total) by mouth daily 90 tablet 3    simvastatin (ZOCOR) 40 mg tablet Take 1 tablet (40 mg total) by mouth every evening 90 tablet 3    Turmeric 400 MG CAPS Take 800 mg by mouth 2 (two) times a day       No current facility-administered medications on file prior to visit.   No Known Allergies   Current Outpatient Medications on File Prior to Visit   Medication Sig Dispense Refill    Cranberry (CRANBEREX PO) Take by mouth      levothyroxine (Synthroid) 25 mcg tablet Take 1 tablet (25 mcg total) by mouth daily in the early morning 90 tablet 0    multivitamin (THERAGRAN) TABS Take 1 tablet by mouth daily      pantoprazole (PROTONIX) 40 mg tablet Take 1 tablet (40 mg total) by mouth daily 90 tablet 3    simvastatin (ZOCOR) 40 mg tablet Take 1 tablet (40 mg total) by mouth every evening 90 tablet 3    Turmeric 400 MG CAPS Take 800 mg by mouth 2 (two) times a day       No current facility-administered medications on file prior to visit.      Social History     Tobacco Use    Smoking status: Former     Current packs/day: 0.00     Types:  "Cigarettes     Quit date: 2019     Years since quittin.0    Smokeless tobacco: Never   Vaping Use    Vaping status: Never Used   Substance and Sexual Activity    Alcohol use: Yes     Comment: rarely    Drug use: No    Sexual activity: Not on file     Objective     /82 (BP Location: Left arm, Patient Position: Sitting, Cuff Size: Standard)   Pulse 74   Temp (!) 97.4 °F (36.3 °C)   Ht 5' 9\" (1.753 m)   Wt 69.5 kg (153 lb 4.8 oz)   SpO2 98%   BMI 22.64 kg/m²     Physical Exam  Vitals and nursing note reviewed.   Constitutional:       General: She is not in acute distress.  HENT:      Head: Normocephalic and atraumatic.   Eyes:      General:         Right eye: No discharge.         Left eye: No discharge.      Conjunctiva/sclera: Conjunctivae normal.      Pupils: Pupils are equal, round, and reactive to light.   Cardiovascular:      Rate and Rhythm: Normal rate and regular rhythm.      Heart sounds: Normal heart sounds. No murmur heard.     No friction rub. No gallop.   Pulmonary:      Effort: No respiratory distress.      Breath sounds: No wheezing or rales.   Abdominal:      General: Bowel sounds are normal. There is no distension.      Tenderness: There is no abdominal tenderness.   Lymphadenopathy:      Cervical: No cervical adenopathy.   Skin:     General: Skin is warm and dry.   Neurological:      Mental Status: She is oriented to person, place, and time.   Psychiatric:         Mood and Affect: Mood and affect normal.         Speech: Speech normal.         Behavior: Behavior normal.         Cognition and Memory: Cognition and memory normal.       Administrative Statements           "

## 2024-06-24 DIAGNOSIS — E03.9 ACQUIRED HYPOTHYROIDISM: ICD-10-CM

## 2024-06-24 RX ORDER — LEVOTHYROXINE SODIUM 0.03 MG/1
25 TABLET ORAL
Qty: 90 TABLET | Refills: 1 | Status: SHIPPED | OUTPATIENT
Start: 2024-06-24

## 2024-06-24 NOTE — TELEPHONE ENCOUNTER
Reason for call:   [x] Refill   [] Prior Auth  [] Other:     Office:   [x] PCP/Provider - Kaylee Byrd MD  PCP - General  [] Specialty/Provider -     Medication:   levothyroxine (Synthroid) 25 mcg tablet 25 mcg, Daily (early morning) # 90         Pharmacy: [facility] - [place], [place] - [address] [place] [address]      Does the patient have enough for 3 days?   [x] Yes   [] No - Send as HP to POD

## 2024-09-12 ENCOUNTER — APPOINTMENT (OUTPATIENT)
Dept: LAB | Facility: HOSPITAL | Age: 64
End: 2024-09-12
Payer: COMMERCIAL

## 2024-09-12 DIAGNOSIS — E78.49 OTHER HYPERLIPIDEMIA: ICD-10-CM

## 2024-09-12 DIAGNOSIS — R73.9 HYPERGLYCEMIA: ICD-10-CM

## 2024-09-12 DIAGNOSIS — I10 ESSENTIAL HYPERTENSION: ICD-10-CM

## 2024-09-12 LAB
ALBUMIN SERPL BCG-MCNC: 4.2 G/DL (ref 3.5–5)
ALP SERPL-CCNC: 41 U/L (ref 34–104)
ALT SERPL W P-5'-P-CCNC: 20 U/L (ref 7–52)
ANION GAP SERPL CALCULATED.3IONS-SCNC: 7 MMOL/L (ref 4–13)
AST SERPL W P-5'-P-CCNC: 20 U/L (ref 13–39)
BASOPHILS # BLD AUTO: 0.03 THOUSANDS/ΜL (ref 0–0.1)
BASOPHILS NFR BLD AUTO: 1 % (ref 0–1)
BILIRUB SERPL-MCNC: 0.45 MG/DL (ref 0.2–1)
BUN SERPL-MCNC: 16 MG/DL (ref 5–25)
CALCIUM SERPL-MCNC: 9.7 MG/DL (ref 8.4–10.2)
CHLORIDE SERPL-SCNC: 99 MMOL/L (ref 96–108)
CHOLEST SERPL-MCNC: 193 MG/DL
CO2 SERPL-SCNC: 29 MMOL/L (ref 21–32)
CREAT SERPL-MCNC: 0.56 MG/DL (ref 0.6–1.3)
EOSINOPHIL # BLD AUTO: 0.09 THOUSAND/ΜL (ref 0–0.61)
EOSINOPHIL NFR BLD AUTO: 2 % (ref 0–6)
ERYTHROCYTE [DISTWIDTH] IN BLOOD BY AUTOMATED COUNT: 12 % (ref 11.6–15.1)
EST. AVERAGE GLUCOSE BLD GHB EST-MCNC: 140 MG/DL
GFR SERPL CREATININE-BSD FRML MDRD: 98 ML/MIN/1.73SQ M
GLUCOSE P FAST SERPL-MCNC: 104 MG/DL (ref 65–99)
HBA1C MFR BLD: 6.5 %
HCT VFR BLD AUTO: 40.9 % (ref 34.8–46.1)
HDLC SERPL-MCNC: 58 MG/DL
HGB BLD-MCNC: 13.4 G/DL (ref 11.5–15.4)
IMM GRANULOCYTES # BLD AUTO: 0.02 THOUSAND/UL (ref 0–0.2)
IMM GRANULOCYTES NFR BLD AUTO: 0 % (ref 0–2)
LDLC SERPL CALC-MCNC: 122 MG/DL (ref 0–100)
LYMPHOCYTES # BLD AUTO: 2.14 THOUSANDS/ΜL (ref 0.6–4.47)
LYMPHOCYTES NFR BLD AUTO: 44 % (ref 14–44)
MCH RBC QN AUTO: 31.8 PG (ref 26.8–34.3)
MCHC RBC AUTO-ENTMCNC: 32.8 G/DL (ref 31.4–37.4)
MCV RBC AUTO: 97 FL (ref 82–98)
MONOCYTES # BLD AUTO: 0.34 THOUSAND/ΜL (ref 0.17–1.22)
MONOCYTES NFR BLD AUTO: 7 % (ref 4–12)
NEUTROPHILS # BLD AUTO: 2.22 THOUSANDS/ΜL (ref 1.85–7.62)
NEUTS SEG NFR BLD AUTO: 46 % (ref 43–75)
NONHDLC SERPL-MCNC: 135 MG/DL
NRBC BLD AUTO-RTO: 0 /100 WBCS
PLATELET # BLD AUTO: 279 THOUSANDS/UL (ref 149–390)
PMV BLD AUTO: 9.8 FL (ref 8.9–12.7)
POTASSIUM SERPL-SCNC: 4.3 MMOL/L (ref 3.5–5.3)
PROT SERPL-MCNC: 6.9 G/DL (ref 6.4–8.4)
RBC # BLD AUTO: 4.22 MILLION/UL (ref 3.81–5.12)
SODIUM SERPL-SCNC: 135 MMOL/L (ref 135–147)
TRIGL SERPL-MCNC: 63 MG/DL
TSH SERPL DL<=0.05 MIU/L-ACNC: 2.85 UIU/ML (ref 0.45–4.5)
WBC # BLD AUTO: 4.84 THOUSAND/UL (ref 4.31–10.16)

## 2024-09-12 PROCEDURE — 85025 COMPLETE CBC W/AUTO DIFF WBC: CPT

## 2024-09-12 PROCEDURE — 80061 LIPID PANEL: CPT

## 2024-09-12 PROCEDURE — 84443 ASSAY THYROID STIM HORMONE: CPT

## 2024-09-12 PROCEDURE — 83036 HEMOGLOBIN GLYCOSYLATED A1C: CPT

## 2024-09-12 PROCEDURE — 80053 COMPREHEN METABOLIC PANEL: CPT

## 2024-09-12 PROCEDURE — 36415 COLL VENOUS BLD VENIPUNCTURE: CPT

## 2024-09-18 ENCOUNTER — OFFICE VISIT (OUTPATIENT)
Dept: INTERNAL MEDICINE CLINIC | Facility: CLINIC | Age: 64
End: 2024-09-18
Payer: COMMERCIAL

## 2024-09-18 VITALS
BODY MASS INDEX: 22.51 KG/M2 | SYSTOLIC BLOOD PRESSURE: 130 MMHG | TEMPERATURE: 97.2 F | HEART RATE: 67 BPM | WEIGHT: 152 LBS | OXYGEN SATURATION: 99 % | DIASTOLIC BLOOD PRESSURE: 70 MMHG | HEIGHT: 69 IN

## 2024-09-18 DIAGNOSIS — I10 ESSENTIAL HYPERTENSION: Primary | ICD-10-CM

## 2024-09-18 DIAGNOSIS — G89.29 CHRONIC BILATERAL LOW BACK PAIN WITHOUT SCIATICA: ICD-10-CM

## 2024-09-18 DIAGNOSIS — E11.9 TYPE 2 DIABETES MELLITUS WITHOUT COMPLICATION, WITHOUT LONG-TERM CURRENT USE OF INSULIN (HCC): ICD-10-CM

## 2024-09-18 DIAGNOSIS — E78.49 OTHER HYPERLIPIDEMIA: ICD-10-CM

## 2024-09-18 DIAGNOSIS — E03.9 ACQUIRED HYPOTHYROIDISM: ICD-10-CM

## 2024-09-18 DIAGNOSIS — M54.50 CHRONIC BILATERAL LOW BACK PAIN WITHOUT SCIATICA: ICD-10-CM

## 2024-09-18 DIAGNOSIS — K21.9 GASTROESOPHAGEAL REFLUX DISEASE WITHOUT ESOPHAGITIS: ICD-10-CM

## 2024-09-18 PROCEDURE — 99214 OFFICE O/P EST MOD 30 MIN: CPT | Performed by: FAMILY MEDICINE

## 2024-09-18 RX ORDER — VALSARTAN 40 MG/1
40 TABLET ORAL DAILY
Qty: 90 TABLET | Refills: 3 | Status: SHIPPED | OUTPATIENT
Start: 2024-09-18

## 2024-09-18 RX ORDER — METHOCARBAMOL 500 MG/1
500 TABLET, FILM COATED ORAL 3 TIMES DAILY PRN
Qty: 90 TABLET | Refills: 0 | Status: SHIPPED | OUTPATIENT
Start: 2024-09-18

## 2024-09-18 RX ORDER — PANTOPRAZOLE SODIUM 40 MG/1
40 TABLET, DELAYED RELEASE ORAL DAILY
Qty: 90 TABLET | Refills: 3 | Status: SHIPPED | OUTPATIENT
Start: 2024-09-18

## 2024-09-18 RX ORDER — SIMVASTATIN 40 MG
40 TABLET ORAL EVERY EVENING
Qty: 90 TABLET | Refills: 3 | Status: SHIPPED | OUTPATIENT
Start: 2024-09-18

## 2024-09-18 RX ORDER — LEVOTHYROXINE SODIUM 25 UG/1
25 TABLET ORAL
Qty: 90 TABLET | Refills: 3 | Status: SHIPPED | OUTPATIENT
Start: 2024-09-18

## 2024-09-18 NOTE — ASSESSMENT & PLAN NOTE
Orders:    simvastatin (ZOCOR) 40 mg tablet; Take 1 tablet (40 mg total) by mouth every evening    CBC and differential; Future    Comprehensive metabolic panel; Future    Lipid panel; Future    TSH, 3rd generation; Future

## 2024-09-18 NOTE — ASSESSMENT & PLAN NOTE
Orders:    pantoprazole (PROTONIX) 40 mg tablet; Take 1 tablet (40 mg total) by mouth daily    CBC and differential; Future

## 2024-09-18 NOTE — PROGRESS NOTES
Ambulatory Visit  Name: Mera Encinas      : 1960      MRN: [id number]  Encounter Provider: Kaylee Byrd MD  Encounter Date: 2024   Encounter department: [facility]    Assessment & Plan  Essential hypertension    Orders:    valsartan (DIOVAN) 40 mg tablet; Take 1 tablet (40 mg total) by mouth daily    CBC and differential; Future    Other hyperlipidemia    Orders:    simvastatin (ZOCOR) 40 mg tablet; Take 1 tablet (40 mg total) by mouth every evening    CBC and differential; Future    Comprehensive metabolic panel; Future    Lipid panel; Future    TSH, 3rd generation; Future    Type 2 diabetes mellitus without complication, without long-term current use of insulin (HCC)    Lab Results   Component Value Date    HGBA1C 6.5 (H) 2024       Orders:    CBC and differential; Future    Comprehensive metabolic panel; Future    Hemoglobin A1C; Future    Albumin / creatinine urine ratio    Acquired hypothyroidism    Orders:    levothyroxine (Synthroid) 25 mcg tablet; Take 1 tablet (25 mcg total) by mouth daily in the early morning    CBC and differential; Future    TSH, 3rd generation; Future    Chronic bilateral low back pain without sciatica    Orders:    methocarbamol (ROBAXIN) 500 mg tablet; Take 1 tablet (500 mg total) by mouth 3 (three) times a day as needed for muscle spasms    CBC and differential; Future    MRI lumbar spine wo contrast; Future    Gastroesophageal reflux disease without esophagitis    Orders:    pantoprazole (PROTONIX) 40 mg tablet; Take 1 tablet (40 mg total) by mouth daily    CBC and differential; Future    Orders and recommendations as noted above.  Reviewed recent laboratory testing with her.  Hemoglobin A1c is at 6.5.  Discussed the diagnosis of diabetes.  Watch carbohydrate intake more closely.  Remain as active as possible.  Follow-up with ophthalmology regularly.  Continue to follow-up with podiatry.  Continue current dose of the  levothyroxine.  Will continue to follow TSH with routine laboratory testing and adjust dose if needed.  Continue with the simvastatin.  Discussed other options for cholesterol.  Discussed goals for LDL.  Increase fiber in diet.  Blood pressure currently well-controlled.  Continue with the valsartan as previously.  Back issues reviewed.  These are chronic.  Reviewed previous x-ray which did show some significant degenerative changes.  She does wish to pursue this further since she is having increasing pain.  Will attempt to complete an MRI.  Discussed referral to pain management or back specialist depending on results.  She has already had her flu shot.  Will have her follow-up in about 3 to 5 months with laboratory testing prior to that visit.  Follow-up sooner if needed.     History of Present Illness     She presents for routine follow-up.  Has been having increasing issues with her back and this has been significantly limiting her activities.  Cannot walk very far because of the back discomfort.  She has noticed some intermittent leg weakness she relates to the pain.  Denies any radiation into the legs.  Continues to work but is having increasing difficulty because of the pain.  Tolerating her blood pressure medication without difficulty.  Denies any headaches or localized weakness.  Denies any chest pain or palpitations.  Has not been eating quite as healthy as you she should.  She feels this is why her blood sugars more elevated.  Other than the simvastatin without difficulty.  Denies any significant muscle aches or weakness with this.        History obtained from : patient  Review of Systems   Constitutional:  Positive for fatigue. Negative for activity change, appetite change, chills and fever.   HENT:  Negative for congestion and rhinorrhea.    Eyes:  Negative for visual disturbance.   Respiratory:  Negative for chest tightness and shortness of breath.    Cardiovascular:  Negative for chest pain and  palpitations.   Gastrointestinal:  Negative for abdominal pain, blood in stool, diarrhea, nausea and vomiting.   Endocrine: Negative for polydipsia, polyphagia and polyuria.   Genitourinary:  Negative for dysuria, frequency and urgency.   Musculoskeletal:  Positive for back pain and gait problem.   Skin:  Negative for color change.   Neurological:  Negative for dizziness and headaches.   Hematological:  Does not bruise/bleed easily.   Psychiatric/Behavioral:  Negative for confusion and sleep disturbance. The patient is not nervous/anxious.      Medical History Reviewed by provider this encounter:       Past Medical History   Past Medical History:   Diagnosis Date    GERD (gastroesophageal reflux disease)     Headache(784.0) 10/01/2021    Hyperlipidemia     Urinary tract infection 8/10/2021     Past Surgical History:   Procedure Laterality Date    BLADDER SURGERY      ESOPHAGOGASTRODUODENOSCOPY  2013    Diagnostic    HERNIA REPAIR  8/2020    HYSTERECTOMY  2012    GA LAPS REPAIR HERNIA EXCEPT INCAL/INGUN REDUCIBLE N/A 09/18/2020    Procedure: REPAIR HERNIA VENTRAL & umbilical LAPAROSCOPIC;  Surgeon: Dayton Heredia DO;  Location: [facility];  Service: General     Family History   Problem Relation Age of Onset    Thyroid disease Mother         Born without a thyroid    No Known Problems Father     No Known Problems Sister     No Known Problems Maternal Grandmother     No Known Problems Maternal Grandfather     No Known Problems Paternal Grandmother     No Known Problems Paternal Grandfather     No Known Problems Sister     No Known Problems Sister     No Known Problems Maternal Aunt     No Known Problems Maternal Aunt      Current Outpatient Medications on File Prior to Visit   Medication Sig Dispense Refill    Cranberry (CRANBEREX PO) Take by mouth      multivitamin (THERAGRAN) TABS Take 1 tablet by mouth daily      Turmeric 400 MG CAPS Take 800 mg by mouth 2 (two) times a day      [DISCONTINUED] levothyroxine  (Synthroid) 25 mcg tablet Take 1 tablet (25 mcg total) by mouth daily in the early morning 90 tablet 1    [DISCONTINUED] pantoprazole (PROTONIX) 40 mg tablet Take 1 tablet (40 mg total) by mouth daily 90 tablet 3    [DISCONTINUED] simvastatin (ZOCOR) 40 mg tablet Take 1 tablet (40 mg total) by mouth every evening 90 tablet 3    [DISCONTINUED] valsartan (DIOVAN) 40 mg tablet Take 1 tablet (40 mg total) by mouth daily 90 tablet 1     No current facility-administered medications on file prior to visit.   No Known Allergies   Current Outpatient Medications on File Prior to Visit   Medication Sig Dispense Refill    Cranberry (CRANBEREX PO) Take by mouth      multivitamin (THERAGRAN) TABS Take 1 tablet by mouth daily      Turmeric 400 MG CAPS Take 800 mg by mouth 2 (two) times a day      [DISCONTINUED] levothyroxine (Synthroid) 25 mcg tablet Take 1 tablet (25 mcg total) by mouth daily in the early morning 90 tablet 1    [DISCONTINUED] pantoprazole (PROTONIX) 40 mg tablet Take 1 tablet (40 mg total) by mouth daily 90 tablet 3    [DISCONTINUED] simvastatin (ZOCOR) 40 mg tablet Take 1 tablet (40 mg total) by mouth every evening 90 tablet 3    [DISCONTINUED] valsartan (DIOVAN) 40 mg tablet Take 1 tablet (40 mg total) by mouth daily 90 tablet 1     No current facility-administered medications on file prior to visit.      Social History     Tobacco Use    Smoking status: Former     Current packs/day: 0.00     Types: Cigarettes     Quit date: 2019     Years since quittin.3    Smokeless tobacco: Never   Vaping Use    Vaping status: Never Used   Substance and Sexual Activity    Alcohol use: Yes     Alcohol/week: 4.0 standard drinks of alcohol     Types: 4 Shots of liquor per week     Comment: Once a week    Drug use: No    Sexual activity: Not Currently     Partners: Male     Birth control/protection: Surgical, None     Comment: Hysterectomy         Objective     /70 (BP Location: Left arm, Patient Position:  "Sitting)   Pulse 67   Temp (!) 97.2 °F (36.2 °C) (Temporal)   Ht 5' 9\" (1.753 m)   Wt 68.9 kg (152 lb)   SpO2 99%   BMI 22.45 kg/m²     Physical Exam  Vitals and nursing note reviewed.   Constitutional:       General: She is not in acute distress.     Appearance: She is well-developed and well-groomed.   HENT:      Head: Normocephalic and atraumatic.   Eyes:      General:         Right eye: No discharge.         Left eye: No discharge.      Conjunctiva/sclera: Conjunctivae normal.      Pupils: Pupils are equal, round, and reactive to light.   Cardiovascular:      Rate and Rhythm: Normal rate and regular rhythm.      Pulses: no weak pulses.           Dorsalis pedis pulses are 2+ on the right side and 2+ on the left side.        Posterior tibial pulses are 2+ on the right side and 2+ on the left side.      Heart sounds: Normal heart sounds. No murmur heard.     No friction rub. No gallop.   Pulmonary:      Effort: No respiratory distress.      Breath sounds: No wheezing or rales.   Abdominal:      General: Bowel sounds are normal. There is no distension.      Tenderness: There is no abdominal tenderness.   Musculoskeletal:      Comments: Diffuse low back tenderness over the lumbar area and into the sacroiliac areas bilaterally; negative straight leg raise   Feet:      Right foot:      Skin integrity: No ulcer, skin breakdown, erythema, warmth, callus or dry skin.      Left foot:      Skin integrity: No ulcer, skin breakdown, erythema, warmth, callus or dry skin.   Lymphadenopathy:      Cervical: No cervical adenopathy.   Skin:     General: Skin is warm and dry.   Neurological:      Mental Status: She is alert and oriented to person, place, and time.   Psychiatric:         Mood and Affect: Mood and affect normal.         Speech: Speech normal.         Behavior: Behavior normal. Behavior is cooperative.         Cognition and Memory: Cognition and memory normal.     Diabetic Foot Exam    Patient's shoes and socks " removed.    Right Foot/Ankle   Right Foot Inspection  Skin Exam: skin normal and skin intact. No dry skin, no warmth, no callus, no erythema, no maceration, no abnormal color, no pre-ulcer, no ulcer and no callus.     Toe Exam: ROM and strength within normal limits.     Sensory   Monofilament testing: intact    Vascular  Capillary refills: < 3 seconds  The right DP pulse is 2+. The right PT pulse is 2+.     Left Foot/Ankle  Left Foot Inspection  Skin Exam: skin normal and skin intact. No dry skin, no warmth, no erythema, no maceration, normal color, no pre-ulcer, no ulcer and no callus.     Toe Exam: ROM and strength within normal limits.     Sensory   Monofilament testing: intact    Vascular  Capillary refills: < 3 seconds  The left DP pulse is 2+. The left PT pulse is 2+.     Assign Risk Category  No deformity present  No loss of protective sensation  No weak pulses  Risk: 0

## 2024-09-18 NOTE — ASSESSMENT & PLAN NOTE
Orders:    levothyroxine (Synthroid) 25 mcg tablet; Take 1 tablet (25 mcg total) by mouth daily in the early morning    CBC and differential; Future    TSH, 3rd generation; Future

## 2024-09-18 NOTE — ASSESSMENT & PLAN NOTE
Orders:    valsartan (DIOVAN) 40 mg tablet; Take 1 tablet (40 mg total) by mouth daily    CBC and differential; Future

## 2024-09-18 NOTE — ASSESSMENT & PLAN NOTE
Orders:    methocarbamol (ROBAXIN) 500 mg tablet; Take 1 tablet (500 mg total) by mouth 3 (three) times a day as needed for muscle spasms    CBC and differential; Future    MRI lumbar spine wo contrast; Future

## 2024-09-18 NOTE — ASSESSMENT & PLAN NOTE
Lab Results   Component Value Date    HGBA1C 6.5 (H) 09/12/2024       Orders:    CBC and differential; Future    Comprehensive metabolic panel; Future    Hemoglobin A1C; Future    Albumin / creatinine urine ratio

## 2024-10-03 DIAGNOSIS — Z00.6 ENCOUNTER FOR EXAMINATION FOR NORMAL COMPARISON OR CONTROL IN CLINICAL RESEARCH PROGRAM: ICD-10-CM

## 2024-10-10 ENCOUNTER — APPOINTMENT (OUTPATIENT)
Dept: LAB | Facility: HOSPITAL | Age: 64
End: 2024-10-10
Payer: COMMERCIAL

## 2024-10-10 DIAGNOSIS — Z00.6 ENCOUNTER FOR EXAMINATION FOR NORMAL COMPARISON OR CONTROL IN CLINICAL RESEARCH PROGRAM: ICD-10-CM

## 2024-10-10 PROCEDURE — 36415 COLL VENOUS BLD VENIPUNCTURE: CPT

## 2024-10-22 ENCOUNTER — HOSPITAL ENCOUNTER (OUTPATIENT)
Dept: MRI IMAGING | Facility: HOSPITAL | Age: 64
Discharge: HOME | End: 2024-10-22
Payer: COMMERCIAL

## 2024-10-22 DIAGNOSIS — G89.29 CHRONIC BILATERAL LOW BACK PAIN WITHOUT SCIATICA: ICD-10-CM

## 2024-10-22 DIAGNOSIS — M54.50 CHRONIC BILATERAL LOW BACK PAIN WITHOUT SCIATICA: ICD-10-CM

## 2024-10-22 PROCEDURE — 72148 MRI LUMBAR SPINE W/O DYE: CPT

## 2024-10-25 LAB
APOB+LDLR+PCSK9 GENE MUT ANL BLD/T: NOT DETECTED
BRCA1+BRCA2 DEL+DUP + FULL MUT ANL BLD/T: NOT DETECTED
MLH1+MSH2+MSH6+PMS2 GN DEL+DUP+FUL M: NOT DETECTED

## 2024-11-07 ENCOUNTER — TELEPHONE (OUTPATIENT)
Age: 64
End: 2024-11-07

## 2024-11-08 ENCOUNTER — TELEPHONE (OUTPATIENT)
Age: 64
End: 2024-11-08

## 2024-11-08 ENCOUNTER — TELEPHONE (OUTPATIENT)
Dept: INTERNAL MEDICINE CLINIC | Facility: CLINIC | Age: 64
End: 2024-11-08

## 2024-11-08 NOTE — TELEPHONE ENCOUNTER
MRI showed significant arthritis and is pushing on nerves right greater than left.  Would recommend seeing pain management and/or surgeon for their evaluation and recommendations.

## 2024-11-08 NOTE — TELEPHONE ENCOUNTER
Patient returned call. I read doctor's note, verbatim.  Patient understood. She will try pain management first; please add referral.  Patient had no further questions.

## 2024-11-10 DIAGNOSIS — M54.50 CHRONIC BILATERAL LOW BACK PAIN WITHOUT SCIATICA: Primary | ICD-10-CM

## 2024-11-10 DIAGNOSIS — G89.29 CHRONIC BILATERAL LOW BACK PAIN WITHOUT SCIATICA: Primary | ICD-10-CM

## 2024-12-02 DIAGNOSIS — E03.9 ACQUIRED HYPOTHYROIDISM: ICD-10-CM

## 2024-12-02 DIAGNOSIS — I10 ESSENTIAL HYPERTENSION: ICD-10-CM

## 2024-12-04 RX ORDER — LEVOTHYROXINE SODIUM 25 UG/1
25 TABLET ORAL
Qty: 90 TABLET | Refills: 3 | OUTPATIENT
Start: 2024-12-04

## 2024-12-04 RX ORDER — VALSARTAN 40 MG/1
40 TABLET ORAL DAILY
Qty: 90 TABLET | Refills: 3 | OUTPATIENT
Start: 2024-12-04

## 2024-12-11 ENCOUNTER — CONSULT (OUTPATIENT)
Dept: PAIN MEDICINE | Facility: CLINIC | Age: 64
End: 2024-12-11
Payer: COMMERCIAL

## 2024-12-11 ENCOUNTER — APPOINTMENT (OUTPATIENT)
Dept: RADIOLOGY | Facility: MEDICAL CENTER | Age: 64
End: 2024-12-11
Payer: COMMERCIAL

## 2024-12-11 VITALS
RESPIRATION RATE: 16 BRPM | SYSTOLIC BLOOD PRESSURE: 158 MMHG | WEIGHT: 150.6 LBS | TEMPERATURE: 97.9 F | HEART RATE: 66 BPM | DIASTOLIC BLOOD PRESSURE: 83 MMHG | BODY MASS INDEX: 22.31 KG/M2 | HEIGHT: 69 IN | OXYGEN SATURATION: 97 %

## 2024-12-11 DIAGNOSIS — M79.18 MYOFASCIAL PAIN SYNDROME: ICD-10-CM

## 2024-12-11 DIAGNOSIS — M47.812 CERVICAL SPONDYLOSIS: ICD-10-CM

## 2024-12-11 DIAGNOSIS — M54.2 NECK PAIN: ICD-10-CM

## 2024-12-11 DIAGNOSIS — G89.29 CHRONIC BILATERAL LOW BACK PAIN WITHOUT SCIATICA: Primary | ICD-10-CM

## 2024-12-11 DIAGNOSIS — M54.50 CHRONIC BILATERAL LOW BACK PAIN WITHOUT SCIATICA: Primary | ICD-10-CM

## 2024-12-11 DIAGNOSIS — G89.4 CHRONIC PAIN SYNDROME: ICD-10-CM

## 2024-12-11 PROCEDURE — 99204 OFFICE O/P NEW MOD 45 MIN: CPT | Performed by: ANESTHESIOLOGY

## 2024-12-11 PROCEDURE — 72052 X-RAY EXAM NECK SPINE 6/>VWS: CPT

## 2024-12-11 NOTE — PROGRESS NOTES
Assessment:  1. Chronic bilateral low back pain without sciatica    2. Myofascial pain syndrome    3. Chronic pain syndrome    4. Neck pain    5. Cervical spondylosis        Plan:  Patient is a 64-year-old female complains of mid back and low back pain with chronic pain syndrome secondary to lumbar spondylosis, lumbar degenerative disc disease, lumbar radiculopathy presents to office for initial consultation.      1.  We will provide patient with home exercise program for cervical and lumbar strengthening exercises for her to perform 5 days a week 3 sets of 12 repetitions of each exercise  2.  Will obtain an x-ray of the cervical spine to better assess the degenerative change and correlate patient current presentation.  3.  Will follow-up in 6 weeks at which we will then gauge patient's positive home exercise regimen and possibly order further diagnostic imaging patient shows no significant improvement            [place] Prescription Drug Monitoring Program report was reviewed and was appropriate     Complete risks and benefits including bleeding, infection, tissue reaction, nerve injury and allergic reaction were discussed. The approach was demonstrated using models and literature was provided. Verbal and written consent was obtained.      History of Present Illness:    The patient is a 64 y.o. female who presents for consultation in regards to Back Pain.  Symptoms have been present for 24 years. Symptoms began following an injury at work. Pain is reported to be 6 on the numeric rating scale.  Symptoms are felt nearly constantly and worst in the no typical pattern.  Symptoms are characterized as sharp and dull/aching.  Symptoms are associated with no weakness.  Aggravating factors include bending, leaning forward, leaning bckward, walking, and exercise.  Relieving factors include lying down and relaxation.  No change in symptoms with kneeling, sitting, turning the head, coughing/sneezing, and bowel movements.   [facility]    History and Physical - Hospitalist Service       Date of Admission:  4/13/2021    Assessment & Plan   Edvin Norton is a 64 year old male with history of hypertension, hyperlipidemia, Natarajan's esophagus, anxiety disorder  and recent evaluation for worsening bilateral foot numbness and gait instability being worked up at [facility] who presents with altered mental status.     Acute episode of unresponsiveness with transient altered mental status.  CT and CTA of the brain were negative.  CTA of the neck showed left distal common carotid with moderate stenosis.  Recent MRI of the brain on 3/25 at [facility] was negative.  Recent A1c was normal.  Recent lipid panel on 3/1 showed cholesterol 222 triglycerides 237 HDL 57 and .  -consult neurology, symptoms seem more concerning for seizure  -EEG and MRI to evaluate for possible seizure or stroke  -Check troponin  -Check urinalysis with reflex to culture but no true urinary symptoms but questionable ability to give adequate history  -starting plavix with load and continuing home aspirin 81 mg daily as recommended by neurology  -IV fluids 100 ml/hr and can likely discontinue these tomorrow  -expect multiple days in the hospital for workup and therapies    Chronic Balance problems and numbness in feet bilaterally thought to be neuropathy with ongoing [facility] workup in progress  History Cervical spondylosis and neck surgery  Patient is having persistent symptoms despite previous therapies and symptoms seem to be worsening. Records from recent visit at [facility] show normal EMG and conduction studies in both legs, normal autonomic reflex scan, MRI brain on 3/25 was normal.  MRI of the cervical spine on 3/25 showed some straightening of the lordosis and low-grade anterolisthesis thesis of C3 on C4 but did not have any cord issues identified. Vitamin E was high at 22 with toxicity from vitamin E most commonly causing nausea and hemorrhagic strokes.  AST was mildly elevated at 62 with ALT of 57, TSH was elevated at 10.3 with normal free T4 of 1.0 and negative TPO antibody. MMA was normal, A1c was normal at 5.6, ESR was 8, CBC and basic metabolic panel were normal, extensive autoimmune work-up was negative including LUANN, CCP, SSA, SSB, SM antibody, RNP antibody, SCL antibody, Cristel-1 antibody.  Serology and copper studies were also negative.  HIV was negative.  B12 was normal at 389.  SPEP was normal with no spike protein.   -concern this could be from lumbar spine now with plan to review MRI of lumbar spine as outpatient but bilateral symptom and cord compression seem unlikely  -discussed alcohol use with patient and his wife and that mild LFT elevation, peripheral neuropathy with balance issues, Father that was an alcoholic, 14 drinks per week noted by patient with 2 drinks per day, and a possible seizure all make me concerned this could be alcohol related. Patient and wife say he really does only drink 2 drinks a night and does not drink in excess and they do not think the symptoms are from alcohol.  I recommended he stop drinking for a while to make sure it is not contributing but agreed we would pursue workup for other causes.    -continue outpatient workup with Dr. Cervantes and at [facility]     Transaminitis (chronic from 2012)  -stop alcohol  -will need repeat right upper quadrant ultrasound as outpatient and need to follow mild LFT elevation that he has had since 2012 in our records (last ultrasound 6/19/2019 showed diffuse fatty infiltration of the liver and was otherwise normal); would like to see if this resolves off alcohol  -if LFT's continue to be elevated could also have trial off lipitor to see if LFT's improve    Vitamin E toxicity  -discontinue all supplements that contain vitamin E    Elevated TSH with normal free T4 and negative TPO antibody  -with normal free T4 this is likely euthyroid sick syndrome but TSH is quite elevated  -recommend following up  Treatments that have been helpful include chiropractic manipulation and heat/ice. Medications to relieve symptoms include ibuprofen.    Review of Systems:    Review of Systems   Musculoskeletal:  Positive for back pain.   All other systems reviewed and are negative.          Past Medical History:   Diagnosis Date    GERD (gastroesophageal reflux disease)     Headache(784.0) 10/01/2021    Hyperlipidemia     Urinary tract infection 8/10/2021       Past Surgical History:   Procedure Laterality Date    BLADDER SURGERY      ESOPHAGOGASTRODUODENOSCOPY  2013    Diagnostic    HERNIA REPAIR  2020    HYSTERECTOMY  2012    TN LAPS REPAIR HERNIA EXCEPT INCAL/INGUN REDUCIBLE N/A 2020    Procedure: REPAIR HERNIA VENTRAL & umbilical LAPAROSCOPIC;  Surgeon: Dayton Heredia DO;  Location: [facility];  Service: General       Family History   Problem Relation Age of Onset    Thyroid disease Mother         Born without a thyroid    No Known Problems Father     No Known Problems Sister     No Known Problems Maternal Grandmother     No Known Problems Maternal Grandfather     No Known Problems Paternal Grandmother     No Known Problems Paternal Grandfather     No Known Problems Sister     No Known Problems Sister     No Known Problems Maternal Aunt     No Known Problems Maternal Aunt        Social History     Occupational History    Not on file   Tobacco Use    Smoking status: Former     Current packs/day: 0.00     Types: Cigarettes     Quit date: 2019     Years since quittin.5    Smokeless tobacco: Never   Vaping Use    Vaping status: Never Used   Substance and Sexual Activity    Alcohol use: Yes     Alcohol/week: 4.0 standard drinks of alcohol     Types: 4 Shots of liquor per week     Comment: Once a week    Drug use: No    Sexual activity: Not Currently     Partners: Male     Birth control/protection: Surgical, None     Comment: Hysterectomy         Current Outpatient Medications:     Cranberry (CRANBEREX PO), Take by  "mouth, Disp: , Rfl:     levothyroxine (Synthroid) 25 mcg tablet, Take 1 tablet (25 mcg total) by mouth daily in the early morning, Disp: 90 tablet, Rfl: 3    methocarbamol (ROBAXIN) 500 mg tablet, Take 1 tablet (500 mg total) by mouth 3 (three) times a day as needed for muscle spasms, Disp: 90 tablet, Rfl: 0    multivitamin (THERAGRAN) TABS, Take 1 tablet by mouth daily, Disp: , Rfl:     pantoprazole (PROTONIX) 40 mg tablet, Take 1 tablet (40 mg total) by mouth daily, Disp: 90 tablet, Rfl: 3    simvastatin (ZOCOR) 40 mg tablet, Take 1 tablet (40 mg total) by mouth every evening, Disp: 90 tablet, Rfl: 3    Turmeric 400 MG CAPS, Take 800 mg by mouth 2 (two) times a day, Disp: , Rfl:     valsartan (DIOVAN) 40 mg tablet, Take 1 tablet (40 mg total) by mouth daily, Disp: 90 tablet, Rfl: 3    No Known Allergies    Physical Exam:    /83   Pulse 66   Temp 97.9 °F (36.6 °C)   Resp 16   Ht 5' 9\" (1.753 m)   Wt 68.3 kg (150 lb 9.6 oz)   SpO2 97%   BMI 22.24 kg/m²     Constitutional: normal, well developed, well nourished, alert, in no distress and non-toxic and no overt pain behavior.  Eyes: anicteric  HEENT: grossly intact  Neck: supple, symmetric, trachea midline and no masses   Pulmonary:even and unlabored  Cardiovascular:No edema or pitting edema present  Skin:Normal without rashes or lesions and well hydrated  Psychiatric:Mood and affect appropriate  Neurologic:Cranial Nerves II-XII grossly intact  Musculoskeletal:antalgic    Cervical Spine examination demonstrates. Decreased ROM secondary to pain with lateral rotation to the left/right and bending to the left/right, in addition to neck flexion. 5/5 upper extremity strength in all muscle groups bilaterally. Negative Spurling's maneuver to the b/l Ue, sensitivity to light touch intact b/l Ue.      Lumbar/Sacral Spine examination demonstrates.  Full range of motion lumbar spine with pain upon: flexion, lateral rotation to the left/right, and bending to the " with PCP and repeating TSH and free T4 in 4-6 weeks    Mixed hyperlipidemia  -Continue home atorvastatin and fenofibrate     Essential hypertension, benign (HTN)  - hold home PRN propranolol (looks like PCP was planning to make this a scheduled medicine with recent high pressures), can decide if this should be scheduled or PRN at discharge     JORGE (generalized anxiety disorder)  stable symptomatically  -continue home sertraline     Methylenetetrahydrofolate reductase (MTHFR) deficiency (H)  Elevated homocysteine  - hold home L-Methylfolate-B6-B12 while in the hospital     Natarajan's esophagus with dysplasia  stable symptomatically   - continue PTA omeprazole     Lumbar disc herniation  Records note he had a lumbar spine MRI with Dr. Cervantes and that he has a L5-S1 herniated disc but I can not find any of those records.  -will need to get those reviewed as outpatient to see if MRI shows nerve entrapment but would be unusual to have chronic numbness symptoms in both feet     Diet:   NPO until cleared by speech and then can have regular diet  DVT Prophylaxis: Pneumatic Compression Devices  Kwan Catheter: not present  Code Status:   Full code       Disposition Plan   Expected discharge: 2 - 3 days, recommended to prior living arrangement once mental status at baseline and neurology workup complete with plans in place.  Entered: Vinayak Mcneill MD 04/13/2021, 8:04 AM     The patient's care was discussed with the Patient and Patient's Family.    Vinayak Mcneill MD  [facility]  Contact information available via [facility] Paging/Directory  ______________________________________________________________________    Chief Complaint   Altered mental status    The history is provided by patient, spouse and extensive chart review.    History of Present Illness   Edvin Norton is a 64 year old male with history of hypertension, hyperlipidemia, Natarajan's esophagus, anxiety disorder  and recent evaluation  "for worsening bilateral foot numbness and gait instability being worked up at [facility] who presents with altered mental status. The patient went to bed around 2130 and was normal at this time.  He had a long day at [facility] with multiple doctor visits yesterday. His wife woke up around 0230 to the patient making \"gargling sounds\". She spent about 10 minutes trying to wake up the patient and was unsuccessful so she called EMS she says that the patient was diaphoretic, but was still breathing and was not whiter/blue. She says that he had no incontinence either. When EMS arrived about 5 minutes after the patient was able to be wakened and walk down the stairs, albeit unsteadily. She denies any exposure to COVID, and note that the patient has received his second COVID vaccination about 3 weeks ago. When EMS arrived they noted the patient to be disoriented and struggling to follow instructions or answer questions appropriately. The patient was confused but was able to ambulate down the stairs to the ambulance with paramedics although he was very unsteady. They measured his oxygen saturations at 92-94%. No facial droop was noted.      Recent [facility] workup reviewed that he was getting because of worsening numbness in his feet with unsteadiness and frequent falls. Records from recent visit at [facility] shows normal EMG and conduction studies in both legs, normal autonomic reflex scan, MRI brain on 3/25 was normal.  MRI of the cervical spine on 3/25 showed some straightening of the lordosis and low-grade anterolisthesis thesis of C3 on C4 but did not have any cord issues identified. Vitamin E was high at 22 with toxicity from vitamin E most commonly causing nausea and hemorrhagic strokes. AST was mildly elevated at 62 with ALT of 57, TSH was elevated at 10.3 with normal free T4 of 1.0 and negative TPO antibody. MMA was normal, A1c was normal at 5.6, ESR was 8, CBC and basic metabolic panel were normal, extensive autoimmune work-up was " left/right.  Bilateral lumbar paraspinals tender to palpation. Muscle spasms noted in the lumbar area bilaterally. 4/5 lower extremity strength in all muscle groups bilaterally. Positive seated straight leg raise for bilateral lower extremities.  Sensitivity to light touch intact bilateral lower extremities. 2+ reflexes in the patella and Achilles.  No ankle clonus      Imaging    Study Result    Narrative & Impression   MRI LUMBAR SPINE WITHOUT CONTRAST     INDICATION: M54.50: Low back pain, unspecified  G89.29: Other chronic pain.     COMPARISON: X-ray dated 1/22/2019 and CT abdomen/pelvis dated 4/3/2024.     TECHNIQUE:  Multiplanar, multisequence imaging of the lumbar spine was performed. .        IMAGE QUALITY:  Diagnostic     FINDINGS:     VERTEBRAL BODIES:  There are 5 lumbar type vertebral bodies. Minimal degenerative retrolisthesis at L1-2, L2-3 and L3-4. No spondylolysis. Mild dextroscoliosis. No compression. Multilevel  Modic type I and type II degenerative changes with Modic type I   changes most pronounced at L2-3 and L4-5. No suspicious marrow signal abnormality.     SACRUM:  Normal signal within the sacrum. No evidence of insufficiency or stress fracture. Tarlov cysts are noted.     DISTAL CORD AND CONUS:  Normal size and signal within the distal cord and conus.     PARASPINAL SOFT TISSUES:  Paraspinal soft tissues are unremarkable.     LOWER THORACIC DISC SPACES: Stable focal ossification of the posterior longitudinal ligament at T10-11 causing mild canal stenosis. Small disc bulges without significant canal stenosis at T11-12 and T12-L1.     LUMBAR DISC SPACES:     L1-L2: Small disc bulge. No significant canal or foraminal stenosis.     L2-L3: Small disc bulge and marginal osteophyte with mild facet arthropathy. Mild canal stenosis. Mild left foraminal stenosis.     L3-L4: Disc osteophyte complex and facet arthropathy. Mild canal stenosis. Subarticular stenosis. Moderate right and mild left foraminal  negative including LUANN, CCP, SSA, SSB, SM antibody, RNP antibody, SCL antibody, Cristel-1 antibody.  Serology and copper studies were also negative.  HIV was negative.  B12 was normal at 389.  SPEP was normal with no spike protein.       Review of Systems    The 10 point Review of Systems is negative other than noted in the HPI or here. Nausea and vomiting last night after some gas station pizza.    Past Medical History    I have reviewed this patient's medical history and updated it with pertinent information if needed.   Past Medical History:   Diagnosis Date     Anxiety state 9/26/2013     Problem list name updated by automated process. Provider to review     Natarajan esophagus 9/26/2013     Essential hypertension, benign (HTN) 10/27/2014     Hyperlipidemia 1995     Obesity due to excess calories, unspecified obesity severity 3/24/2016       Past Surgical History   I have reviewed this patient's surgical history and updated it with pertinent information if needed.  Past Surgical History:   Procedure Laterality Date     CATARACT IOL, RT/LT       ESOPHAGUS SURGERY      Halo procedure x 5     FINGER SURGERY      tendon     FUSION CERVICAL ANTERIOR THREE+ LEVELS N/A 7/10/2020    Procedure: Anterior cervical diskectomy and fusion with anterior plate fixation cervical 4-7;  Surgeon: Dane Anderson MD;  Location: [facility] OR     GRAFT BONE FROM ILIAC CREST Right 7/10/2020    Procedure: Right Anterior iliac crest bone graft harvest;  Surgeon: Dane Anderson MD;  Location: [facility] OR     UPPER GI ENDOSCOPY         Social History   I have reviewed this patient's social history and updated it with pertinent information if needed.  Social History     Tobacco Use     Smoking status: Never Smoker     Smokeless tobacco: Former User     Types: Chew   Substance Use Topics     Alcohol use: Yes     Alcohol/week: 14.0 standard drinks     Types: 14 Standard drinks or equivalent per week     Drug use: No       Family History   I  stenosis.     L4-L5: Disc bulge and marginal osteophyte asymmetric to the right. Facet arthropathy. No significant central canal stenosis. Right subarticular/lateral recess stenosis, correlate for right L5 radiculopathy. Moderate right and mild left foraminal   stenosis.     L5-S1: Disc bulge and marginal osteophyte asymmetric to the left. Facet arthropathy. No significant canal stenosis. Moderate foraminal stenosis.     OTHER FINDINGS: Stable 1 cm left renal cyst and stable 1 cm left adrenal nodule.     IMPRESSION:     Multilevel degenerative spondylosis as described.        Workstation performed: [id number]         No orders to display       No orders of the defined types were placed in this encounter.      have reviewed this patient's family history and updated it with pertinent information if needed.  Family History   Problem Relation Age of Onset     Hypertension Mother         passed  age 96     Anxiety Disorder Mother      Anxiety Disorder Father      Lung Cancer Father      Esophageal Cancer Brother      Psoriasis Brother      Hyperlipidemia Sister      Anxiety Disorder Sister      Esophageal Cancer Brother      Aneurysm Sister         brain     Thyroid Disease Brother      Neurologic Disorder Brother         unspecified - jerking motions at night     C.A.D. No family hx of      Diabetes No family hx of      Breast Cancer No family hx of      Cancer - colorectal No family hx of      Prostate Cancer No family hx of        Prior to Admission Medications   Prior to Admission Medications   Prescriptions Last Dose Informant Patient Reported? Taking?   ASPIRIN PO   Yes No   Sig: Take 81 mg by mouth every morning    B Complex-Folic Acid (SUPER B COMPLEX MAXI) TABS   No No   Sig: Take 1 tablet by mouth daily   L-Methylfolate-B6-B12 (FOLTX) 1.13-25-2 MG TABS   No No   Sig: Take 1 Dose by mouth daily   Omega-3 Fatty Acids (OMEGA-3 FISH OIL PO)   Yes No   Sig: Take 1 g by mouth every morning    acetaminophen (TYLENOL) 325 MG tablet   No No   Sig: Take 2 tablets (650 mg) by mouth every 4 hours as needed for mild pain Max tylenol 4 gm per day   atorvastatin (LIPITOR) 40 MG tablet   No No   Sig: Take 1 tablet (40 mg) by mouth daily   fenofibrate (TRIGLIDE/LOFIBRA) 160 MG tablet   No No   Sig: Take 1 tablet (160 mg) by mouth daily   omeprazole (PRILOSEC) 40 MG DR capsule   No No   Sig: Take 1 capsule (40 mg) by mouth every morning   propranolol (INDERAL) 20 MG tablet   No No   Sig: TAKE 1 TABLET BY MOUTH DAILY AS NEEDED   propranolol (INDERAL) 40 MG tablet   No No   Sig: Take 1 tablet (40 mg) by mouth daily   sertraline (ZOLOFT) 100 MG tablet   No No   Sig: Take 1.5 tablets (150 mg) by mouth daily      Facility-Administered  Medications: None     Allergies   No Known Allergies    Physical Exam   Vital Signs: Temp: 97.8  F (36.6  C) Temp src: Temporal BP: (!) 156/95 Pulse: 85   Resp: 18 SpO2: 96 % O2 Device: None (Room air)    Weight: 217 lbs 6.4 oz  Constitutional: Awake, alert, cooperative, no apparent distress, confusion seeming to be mostly resolved.  Eyes: Conjunctiva and pupils examined and normal.  HEENT: Moist mucous membranes, normal dentition.  Respiratory: Clear to auscultation bilaterally, no crackles or wheezing.  Cardiovascular: Regular rate and rhythm, normal S1 and S2, and no murmur noted.  GI: Soft, non-distended, non-tender, normal bowel sounds.  Lymph/Hematologic: No anterior cervical or supraclavicular adenopathy.  Skin: No rashes, no cyanosis, no edema.  Musculoskeletal: No joint swelling, erythema or tenderness.  Neurologic: Cranial nerves 2-12 intact, normal strength and sensation, very mild right pronator drift noted initially but not seen on repeat.  Psychiatric: Alert, oriented to person, place and time, no obvious anxiety or depression.    Data   Data reviewed today: I reviewed all medications, new labs and imaging results over the last 24 hours. I personally reviewed no images or EKG's today.    Recent Labs   Lab 04/13/21  0946 04/13/21  0328   WBC  --  6.6   HGB  --  14.5   MCV  --  94   PLT  --  226   INR  --  1.01   NA  --  137   POTASSIUM 3.3* 3.5   CHLORIDE  --  104   CO2  --  22   BUN  --  17   CR  --  0.97   ANIONGAP  --  11   LILI  --  9.1   * 147*   TROPI <0.015 <0.015     Recent Results (from the past 24 hour(s))   CT Head w/o Contrast    Narrative    EXAM: CTA  HEAD NECK WITH CONTRAST, CT HEAD W/O CONTRAST  LOCATION: [facility]  DATE/TIME: 4/13/2021 3:49 AM    INDICATION: Confusion. Altered mental status. Cerebral infarct/stroke.  COMPARISON: None.  CONTRAST: 70mL [id number]  TECHNIQUE: Head and neck CT angiogram with IV contrast. Noncontrast head CT followed by axial helical CT  images of the head and neck vessels obtained during the arterial phase of intravenous contrast administration. Axial 2D reconstructed images and   multiplanar 3D MIP reconstructed images of the head and neck vessels were performed by the technologist. Dose reduction techniques were used. All stenosis measurements made according to NASCET criteria unless otherwise specified.    FINDINGS:   NONCONTRAST HEAD CT:   INTRACRANIAL CONTENTS: No intracranial hemorrhage, extraaxial collection, or mass effect.  No CT evidence of acute infarct. Normal parenchymal attenuation. Normal ventricles and sulci.       HEAD CTA:  ANTERIOR CIRCULATION: No stenosis/occlusion, aneurysm, or high flow vascular malformation. Standard [place] anatomy.    POSTERIOR CIRCULATION: No stenosis/occlusion, aneurysm, or high flow vascular malformation. Balanced vertebral arteries supply a normal basilar artery.     DURAL VENOUS SINUSES: Not well evaluated on a technical basis.      NECK CTA:  RIGHT CAROTID: No measurable stenosis or dissection. Right extracranial carotid artery demonstrate a retropharyngeal medially deviated course impressing upon the aerodigestive airway.    LEFT CAROTID: Left distal common carotid artery moderate stenosis secondary to calcified/noncalcified plaque. Otherwise, no significant stenosis/occlusion or dissection.    VERTEBRAL ARTERIES: No focal stenosis or dissection. Balanced vertebral arteries.    AORTIC ARCH: Classic aortic arch anatomy with no significant stenosis at the origin of the great vessels.    NONVASCULAR STRUCTURES:   Dental amalgam resulting in streak artifact. C4 C7 ACDF. Degenerative changes noted within the spine. Mild emphysema.        Impression    IMPRESSION:   HEAD CT:  1.  No acute intracranial hemorrhage.  2.  No CT evidence of acute infarct. Aspect score 10.    Dr Les Thomas was notified by Dr Johnny Schaffer at  3:58 AM 04/13/2021.    HEAD CTA:   1.  No intracranial arterial large vessel  occlusion.  2.  Proximal intracranial arteries demonstrate no significant stenosis/occlusion.     NECK CTA:  1.  Left distal common carotid artery moderate stenosis secondary to calcified/noncalcified plaque.  2.  Otherwise, cervical arteries demonstrate no significant stenosis/occlusion or dissection.       Dr Les Thomas was notified by Dr Johnny Schaffer at  4:20 AM 04/13/2021.   CTA Head Neck with Contrast    Narrative    EXAM: CTA  HEAD NECK WITH CONTRAST, CT HEAD W/O CONTRAST  LOCATION: [facility]  DATE/TIME: 4/13/2021 3:49 AM    INDICATION: Confusion. Altered mental status. Cerebral infarct/stroke.  COMPARISON: None.  CONTRAST: 70mL Isovue-370  TECHNIQUE: Head and neck CT angiogram with IV contrast. Noncontrast head CT followed by axial helical CT images of the head and neck vessels obtained during the arterial phase of intravenous contrast administration. Axial 2D reconstructed images and   multiplanar 3D MIP reconstructed images of the head and neck vessels were performed by the technologist. Dose reduction techniques were used. All stenosis measurements made according to NASCET criteria unless otherwise specified.    FINDINGS:   NONCONTRAST HEAD CT:   INTRACRANIAL CONTENTS: No intracranial hemorrhage, extraaxial collection, or mass effect.  No CT evidence of acute infarct. Normal parenchymal attenuation. Normal ventricles and sulci.       HEAD CTA:  ANTERIOR CIRCULATION: No stenosis/occlusion, aneurysm, or high flow vascular malformation. Standard [place] anatomy.    POSTERIOR CIRCULATION: No stenosis/occlusion, aneurysm, or high flow vascular malformation. Balanced vertebral arteries supply a normal basilar artery.     DURAL VENOUS SINUSES: Not well evaluated on a technical basis.      NECK CTA:  RIGHT CAROTID: No measurable stenosis or dissection. Right extracranial carotid artery demonstrate a retropharyngeal medially deviated course impressing upon the aerodigestive airway.    LEFT  CAROTID: Left distal common carotid artery moderate stenosis secondary to calcified/noncalcified plaque. Otherwise, no significant stenosis/occlusion or dissection.    VERTEBRAL ARTERIES: No focal stenosis or dissection. Balanced vertebral arteries.    AORTIC ARCH: Classic aortic arch anatomy with no significant stenosis at the origin of the great vessels.    NONVASCULAR STRUCTURES:   Dental amalgam resulting in streak artifact. C4 C7 ACDF. Degenerative changes noted within the spine. Mild emphysema.        Impression    IMPRESSION:   HEAD CT:  1.  No acute intracranial hemorrhage.  2.  No CT evidence of acute infarct. Aspect score 10.    Dr Les Thomas was notified by Dr Johnny Schaffer at  3:58 AM 04/13/2021.    HEAD CTA:   1.  No intracranial arterial large vessel occlusion.  2.  Proximal intracranial arteries demonstrate no significant stenosis/occlusion.     NECK CTA:  1.  Left distal common carotid artery moderate stenosis secondary to calcified/noncalcified plaque.  2.  Otherwise, cervical arteries demonstrate no significant stenosis/occlusion or dissection.       Dr Les Thomas was notified by Dr Johnny Schaffer at  4:20 AM 04/13/2021.   CT Head Perfusion w Contrast    Narrative    EXAM: CT HEAD PERFUSION WITH CONTRAST  LOCATION: [facility]  DATE/TIME: 4/13/2021 3:49 AM    INDICATION: Confusion. Altered mental status. Cerebral infarct/stroke.  COMPARISON: None.  TECHNIQUE: CT cerebral perfusion was performed utilizing a second contrast bolus. Perfusion data were post processed with generation of standard perfusion maps and estimation of ischemic/infarcted volumes utilizing standard threshold values. Dose   reduction techniques were used.    CONTRAST: 50mL Isovue-370    FINDINGS:   CT PERFUSION:  PERFUSION MAPS: Symmetrical cerebral perfusion. No acute perfusion abnormality..    RAPID ANALYSIS:  CBF<30%: 0 mL  Tmax>6sec: 0 mL  Mismatch volume: 0 mL  Mismatch ratio: None      Impression    IMPRESSION:    1.  No acute perfusion abnormality.      Dr Les Thomas was notified by Dr Johnny Schaffer at  4:20 AM 2021.   MR Brain w/o & w Contrast    Narrative    MR BRAIN W/O & W CONTRAST 2021 1:13 PM    INDICATION: Neuro deficit, acute, stroke suspected; Acute ischemic  stroke balance problem. Numbness  TECHNIQUE: Noncontrast and contrast enhanced MRI of the brain.  CONTRAST: 10 mL Gadavist   COMPARISON: Head and neck CTA 2021    FINDINGS:  There is no restricted diffusion. Mild mucosal thickening  within the ethmoid air cells. Paranasal sinuses are otherwise free  from significant disease. Mastoid air cells appear free from  significant disease. Intraorbital contents are unremarkable.  Ventricles are within normal limits in size for the patient's age.  Intracranial flow voids are intact. There is no mass effect, midline  shift, or extraaxial collection. A few foci of nonspecific T2/FLAIR  hyperintensity within the white matter probably reflect minor changes  of chronic small vessel ischemic injury and are within the range of  expected for a patient of this age. No evidence for acute or chronic  intracranial blood products.      Impression    IMPRESSION:   1.  Negative brain MRI for age. No acute intracranial finding. No  evidence for recent ischemia, intracranial hemorrhage, or mass.    ALMA MOCTEZUMA MD   Echocardiogram Complete - For age > 60 yrs    Narrative    [id number]  [id number]  [id number]  [id number]^JONNY^TARYN^R     [facility]  Echocardiography Laboratory  [address] [place] [address]     Name: TAMIKA LUA  MRN: [id number]  : 1957  Study Date: 2021 02:59 PM  Age: 64 yrs  Gender: Male  Patient Location: [facility]  Reason For Study: Cerebrovascular Incident  Ordering Physician: TARYN FULLER  Referring Physician: BRENDA GIBSON  Performed By: Audelia Kelley     BSA: 2.1 m2  Height: 68 in  Weight: 217 lb  HR: 77  BP: 156/95  mmHg  ______________________________________________________________________________  Procedure  Complete Portable Echo Adult.  ______________________________________________________________________________  Interpretation Summary     The left ventricle is normal in size. There is normal left ventricular wall  thickness. Left ventricular systolic function is normal. The visual ejection  fraction is estimated at 55-60%. Diastolic Doppler findings (E/E' ratio and/or  other parameters) suggest left ventricular filling pressures are  indeterminate. No regional wall motion abnormalities noted.  The right ventricle is normal size. The right ventricular systolic function is  normal.  Trace mitral and tricuspid regurgitation.  No pericardial effusion.  No previous study for comparison.  ______________________________________________________________________________  Left Ventricle  The left ventricle is normal in size. There is normal left ventricular wall  thickness. Left ventricular systolic function is normal. The visual ejection  fraction is estimated at 55-60%. Diastolic Doppler findings (E/E' ratio and/or  other parameters) suggest left ventricular filling pressures are  indeterminate. No regional wall motion abnormalities noted.     Right Ventricle  The right ventricle is normal size. The right ventricular systolic function is  normal.     Atria  Normal left atrial size. Right atrial size is normal. There is no color  Doppler evidence of an atrial shunt.     Mitral Valve  There is trace mitral regurgitation.     Tricuspid Valve  There is trace tricuspid regurgitation. Right ventricular systolic pressure  could not be approximated due to inadequate tricuspid regurgitation.     Aortic Valve  There is mild trileaflet aortic sclerosis. No aortic regurgitation is present.  No aortic stenosis is present. Transaortic gradients are mildly increased  consistent with aortic sclerosis.     Pulmonic Valve  There is no pulmonic  valvular stenosis.     Vessels  The aortic root is normal size. Normal size ascending aorta. The inferior vena  cava is normal.     Pericardium  There is no pericardial effusion.     Rhythm  Sinus rhythm was noted.  ______________________________________________________________________________  MMode/2D Measurements & Calculations  IVSd: 1.1 cm     LVIDd: 5.2 cm  LVIDs: 3.3 cm  LVPWd: 1.1 cm  FS: 36.7 %  LV mass(C)d: 219.4 grams  LV mass(C)dI: 103.7 grams/m2  Ao root diam: 3.2 cm  LA dimension: 5.0 cm  asc Aorta Diam: 3.3 cm  LA/Ao: 1.6  LVOT diam: 2.4 cm  LVOT area: 4.6 cm2  LA Volume (BP): 51.2 ml  LA Volume Index (BP): 24.2 ml/m2  RWT: 0.41     Doppler Measurements & Calculations  MV E max richy: 91.7 cm/sec  MV A max richy: 84.0 cm/sec  MV E/A: 1.1  MV dec time: 0.23 sec  PA V2 max: 133.4 cm/sec  PA max P.1 mmHg  PA acc time: 0.13 sec  E/E' avg: 10.9  Lateral E/e': 9.8  Medial E/e': 12.0     ______________________________________________________________________________  Report approved by: Mickey Aleman 2021 03:35 PM

## 2024-12-11 NOTE — PATIENT INSTRUCTIONS
Patient Education     Core Strengthening Exercises on Back or on Hands and Knees   About this topic   Your core muscles are in your chest, back, buttock, and stomach area. They are your abdominal, back, and pelvis muscles. These muscles help keep your body stable when using your arms or legs. They also help with balance and posture. There are many exercises you can do to keep these muscles strong.  If you have back problems like a compression fracture or a ruptured disc, doing some of these exercises could make your problem worse. Some of these exercises may cause lower back pain.  General   Before starting with a program, ask your doctor if you are healthy enough to do these exercises. Your doctor may have you work with a , chiropractor, or physical therapist to make a safe exercise program to meet your needs.  Strengthening Exercises   Strengthening exercises keep your muscles firm and strong. Start by repeating each exercise 2 to 3 times. Work up to doing each exercise 10 times. Try to do the exercises 2 to 3 times each day. Hold each exercise for 3 to 5 seconds. Do all exercises slowly.  Hip lifts ? Lie on your back with your knees bent and feet flat on the floor. Tighten your stomach muscles and push your heels into the floor to lift your buttocks off the floor. Relax.  Pelvic tilts ? Lie on your back with your knees bent and feet flat on the floor. Tighten your stomach muscles and press your lower back down to the floor. Relax.  Straight leg raises lying down ? Lie on your back with one leg straight. Bend your other knee so the foot is flat on the bed. Keeping your leg straight, lift the leg up to the level of your other knee. Lower it back down. Repeat with the other leg.  Knee flex lying down ? Lie on your back with both knees bent and your feet flat on the floor. Tighten your belly muscles. Raise one leg up and back down as if you are marching in slow motion. Keep belly muscles tight while you move  your leg. Switch legs. To make this exercise harder, raise both arms straight up in the air. Tighten your belly muscles. When you raise one leg up, reach the opposite arm over your head. Switch, moving the opposite arm and leg until you have done 10 repetitions on each side.  Abdominal crunches ? Lie on your back with both knees bent. Keep your feet flat on the floor. Place your hands in one of these positions. Try starting with the first position since it is the easiest. As you get better, use the other positions to make it harder.  Crunches with arms at sides.  Crunches with arms across chest.  Crunches with arms behind head. Be careful not to interlock your fingers behind your neck or head while doing crunches. This may add tension to your neck and cause strain.  Look at the ceiling. Tighten your belly muscles and lift your shoulders and upper back off the floor. Breathe out while you are doing this. Lower your shoulders to the floor. Breathe in while you are doing this. Relax your belly muscles all the way before starting another crunch.  Arm and leg lifts on hands and knees ? Start on your hands and knees. With all of these exercises, keep your back as level as possible. If you are having trouble with this, you may want to put a small object on your back such as a book. If it falls off, you are not keeping your back level enough during the exercise.  Lift one arm up to shoulder level and hold. Lower it back down. Now, lift up the other arm and hold.  Lift one leg up and kick it straight out until it is in line with your back and hold. Lower it back down. Now, lift up the other leg and hold.  Lift one arm and the OPPOSITE leg up at the same time and hold. Lower them down. Now, repeat using the other arm and leg. This is a very hard exercise. It may take time to be able to do this.               What will the results be?   Stronger core  Better balance  More toned belly and back muscles  Easier to do daily  activities  Better sports performance  Less low back pain  Helpful tips   Stay active and work out to keep your muscles strong and flexible.  Keep a healthy weight to avoid putting too much stress on your spine. Eat a healthy diet to keep your muscles healthy.  Be sure you do not hold your breath when exercising. This can raise your blood pressure. If you tend to hold your breath, try counting out loud when exercising. If any exercise bothers you, stop right away.  Try walking or cycling at an easy pace for a few minutes to warm up your muscles. Do this again after exercising.  Exercise may be slightly uncomfortable, but you should not have sharp pains. If you do get sharp pains, stop what you are doing. If the sharp pains continue, call your doctor.  Last Reviewed Date   2021-03-18  Consumer Information Use and Disclaimer   This generalized information is a limited summary of diagnosis, treatment, and/or medication information. It is not meant to be comprehensive and should be used as a tool to help the user understand and/or assess potential diagnostic and treatment options. It does NOT include all information about conditions, treatments, medications, side effects, or risks that may apply to a specific patient. It is not intended to be medical advice or a substitute for the medical advice, diagnosis, or treatment of a health care provider based on the health care provider's examination and assessment of a patient’s specific and unique circumstances. Patients must speak with a health care provider for complete information about their health, medical questions, and treatment options, including any risks or benefits regarding use of medications. This information does not endorse any treatments or medications as safe, effective, or approved for treating a specific patient. [employer] Inc[employer] and its affiliates disclaim any warranty or liability relating to this information or the use thereof. The use of this information  is governed by the Terms of Use, available at https://www.[employer].com/en/know/clinical-effectiveness-terms   Copyright   Copyright © 2024 [employer] and its affiliates and/or licensors. All rights reserved.  Patient Education     Neck Pain Exercises   About this topic   The neck or cervical spine has 7 spinal bones that run from the base of your skull to the upper back. These spinal bones have discs in between them. Discs act as shock absorbers. Ligaments are strong bands of tissue that hold the bones together. Many muscles surround and attach on these bones. Nerves come off of the spinal cord and exit out of small spaces in between the spinal bones. You can have neck pain if any of these are injured or damaged. Exercises may help to make this problem better.  General   Before starting with a program, ask your doctor if you are healthy enough to do these exercises. Your doctor may have you work with a  or physical therapist to make a safe exercise program to meet your needs. You should not do the exercises if they cause sharp pains, if you feel dizzy, or if you have vision changes.  Stretching Exercises   Stretching exercises keep your muscles flexible. They also stop them from getting tight. Start by doing each of these stretches 2 to 3 times. In order for your body to make changes, you will need to hold these stretches for 20 to 30 seconds. Try to do the stretches 2 to 3 times each day. Do all exercises slowly.  Passive neck stretches:  Put your left hand on top of your head. Your other arm can be at your side or behind your back. Pull your head toward your left shoulder until you feel a gentle stretch on the right side of your neck. Repeat on the other side using your other hand.  Also, try this stretch by pulling in a diagonal direction. With your left hand on top of your head, pull your head down towards the direction of your left knee. You should feel this stretch toward the back on the right  side of your neck. Repeat on the other side.  Active neck stretches:  Neck front-to-back motion ? Look down to the floor until you feel a stretch in the back of your neck. Hold. Next, look up to the ceiling until you feel a stretch in the front of your neck. Hold.  Neck side-to-side motion ? Tilt your head to the side and bring your ear to your shoulder until you feel a stretch on the other side of your neck. Hold. Next, tilt your head to the other side until you feel a stretch. Hold.  Neck turning ? Turn only your head and look over your left shoulder until you feel stretching in the right side of your neck. Hold. Now turn only your head and look over your right shoulder until you feel a stretch in the left side of your neck. Hold.  [place] stretches ? Grasp your head with the hand opposite the side you want to stretch. Pull your head to the side until you feel a stretch. Now, slowly turn your head so your chin is pointed upwards.  Chin tucks ? Stand straight or lie down on your back. Tuck your chin in and lengthen the back of your neck. Return to the starting position and repeat. It may help to stand up against a wall during this exercise. Try gently pushing your chin with two fingers while trying to flatten your neck against the wall. If you do this exercise lying down, try using a small rolled up washcloth under your neck. Push down into the washcloth when tucking in your chin.  Strengthening Exercises   Strengthening exercises keep your muscles firm and strong. Start by repeating each exercise 2 to 3 times. Work up to doing each exercise 10 times. Try to do the exercises 2 to 3 times each day. Hold each exercise for 3 to 5 seconds. Do all exercises slowly.  Shoulder blade squeezes ? Pinch your shoulder blades together on your upper back and hold 3 to 5 seconds. Relax.             What will the results be?   Less pain and stiffness  Better range of motion  Increased strength  Help you heal faster after an injury  or surgery  Increase blood flow to a body part  Help you feel better and more relaxed  Give you more energy  More toned looking muscles  Better posture  Easier to do daily activities  Helpful tips   Stay active and work out to keep your muscles strong and flexible.  Be sure you do not hold your breath when exercising. This can raise your blood pressure. If you tend to hold your breath, try counting out loud when exercising. If any exercise bothers you, stop right away.  Try swinging your arms at an easy pace for a few minutes to warm up your muscles. Do this again after exercising.  Doing exercises before a meal may be a good way to get into a routine.  Exercise may be slightly uncomfortable, but you should not have sharp pains. If you do get sharp pains, stop what you are doing. If the sharp pains continue, call your doctor.  Last Reviewed Date   2020-03-10  Consumer Information Use and Disclaimer   This generalized information is a limited summary of diagnosis, treatment, and/or medication information. It is not meant to be comprehensive and should be used as a tool to help the user understand and/or assess potential diagnostic and treatment options. It does NOT include all information about conditions, treatments, medications, side effects, or risks that may apply to a specific patient. It is not intended to be medical advice or a substitute for the medical advice, diagnosis, or treatment of a health care provider based on the health care provider's examination and assessment of a patient’s specific and unique circumstances. Patients must speak with a health care provider for complete information about their health, medical questions, and treatment options, including any risks or benefits regarding use of medications. This information does not endorse any treatments or medications as safe, effective, or approved for treating a specific patient. UpToDate, Inc. and its affiliates disclaim any warranty or liability  relating to this information or the use thereof. The use of this information is governed by the Terms of Use, available at https://www.[employer]olterskluwer.com/en/know/clinical-effectiveness-terms   Copyright   Copyright © 2024 [employer] and its affiliates and/or licensors. All rights reserved.

## 2024-12-26 ENCOUNTER — TELEPHONE (OUTPATIENT)
Dept: INTERNAL MEDICINE CLINIC | Facility: CLINIC | Age: 64
End: 2024-12-26

## 2024-12-26 ENCOUNTER — TELEPHONE (OUTPATIENT)
Age: 64
End: 2024-12-26

## 2024-12-26 ENCOUNTER — APPOINTMENT (OUTPATIENT)
Dept: LAB | Facility: HOSPITAL | Age: 64
End: 2024-12-26
Payer: COMMERCIAL

## 2024-12-26 DIAGNOSIS — R30.0 DYSURIA: Primary | ICD-10-CM

## 2024-12-26 LAB
CREAT UR-MCNC: 19.2 MG/DL
MICROALBUMIN UR-MCNC: 68 MG/L
MICROALBUMIN/CREAT 24H UR: 354 MG/G CREATININE (ref 0–30)

## 2024-12-26 RX ORDER — SULFAMETHOXAZOLE AND TRIMETHOPRIM 800; 160 MG/1; MG/1
1 TABLET ORAL 2 TIMES DAILY
Qty: 14 TABLET | Refills: 0 | Status: SHIPPED | OUTPATIENT
Start: 2024-12-26 | End: 2025-01-02

## 2024-12-26 NOTE — TELEPHONE ENCOUNTER
Patient did go to lab and give specimen for Urine reflex C/S.  She stated she is taking AZO over the counter and it states not to take past 72 hours.  She is now at that [place].  Can something else be called in for her until results are completed.

## 2024-12-26 NOTE — TELEPHONE ENCOUNTER
Patient called stating that she has a UTI - cramping and burning since 12/24/25.   After consulting Dr. Byrd I called and informed that to give a urine specimen and that Dr. Banks is calling a script to [facility]  for antibiotic.  Told her that she must give the specimen before taking the antibiotic.

## 2024-12-26 NOTE — TELEPHONE ENCOUNTER
Patient is having cramping and bradley wanted to know if you could call something in UTI it has been since Tuesday. Patient wanted to know she needs to do a lab or coming to see you for the uti please let her know best # is [phone]    If you call something in it would go to [facility] Phone [phone]

## 2025-01-15 ENCOUNTER — PROCEDURE VISIT (OUTPATIENT)
Dept: PAIN MEDICINE | Facility: CLINIC | Age: 65
End: 2025-01-15
Payer: COMMERCIAL

## 2025-01-15 VITALS
WEIGHT: 150 LBS | BODY MASS INDEX: 22.22 KG/M2 | HEIGHT: 69 IN | RESPIRATION RATE: 16 BRPM | OXYGEN SATURATION: 99 % | TEMPERATURE: 98 F | HEART RATE: 80 BPM

## 2025-01-15 DIAGNOSIS — G89.4 CHRONIC PAIN SYNDROME: ICD-10-CM

## 2025-01-15 DIAGNOSIS — M79.18 MYOFASCIAL PAIN SYNDROME: ICD-10-CM

## 2025-01-15 DIAGNOSIS — M54.50 CHRONIC BILATERAL LOW BACK PAIN WITHOUT SCIATICA: Primary | ICD-10-CM

## 2025-01-15 DIAGNOSIS — G89.29 CHRONIC BILATERAL LOW BACK PAIN WITHOUT SCIATICA: Primary | ICD-10-CM

## 2025-01-15 PROCEDURE — 20553 NJX 1/MLT TRIGGER POINTS 3/>: CPT | Performed by: ANESTHESIOLOGY

## 2025-01-15 PROCEDURE — 76942 ECHO GUIDE FOR BIOPSY: CPT | Performed by: ANESTHESIOLOGY

## 2025-01-15 RX ORDER — BUPIVACAINE HYDROCHLORIDE 2.5 MG/ML
10 INJECTION, SOLUTION EPIDURAL; INFILTRATION; INTRACAUDAL
Status: COMPLETED | OUTPATIENT
Start: 2025-01-15 | End: 2025-01-15

## 2025-01-15 RX ORDER — TRIAMCINOLONE ACETONIDE 40 MG/ML
40 INJECTION, SUSPENSION INTRA-ARTICULAR; INTRAMUSCULAR
Status: COMPLETED | OUTPATIENT
Start: 2025-01-15 | End: 2025-01-15

## 2025-01-15 RX ADMIN — BUPIVACAINE HYDROCHLORIDE 10 ML: 2.5 INJECTION, SOLUTION EPIDURAL; INFILTRATION; INTRACAUDAL at 14:40

## 2025-01-15 RX ADMIN — TRIAMCINOLONE ACETONIDE 40 MG: 40 INJECTION, SUSPENSION INTRA-ARTICULAR; INTRAMUSCULAR at 14:40

## 2025-01-15 NOTE — PATIENT INSTRUCTIONS
Do not apply heat to any area that is numb. If you have discomfort or soreness at the injection site, you may apply ice today, 20 minutes on and 20 minutes off. Tomorrow you may use ice or warm, moist heat. Do not apply ice or heat directly to the skin.  If you experience severe shortness of breath, go to the Emergency Room.  You may have numbness for several hours from the local anesthetic. Please use caution and common sense, especially with weight-bearing activities.  You may have an increase or change in the discomfort for 36-48 hours after your treatment. Apply ice and continue with any pain medicine you have been prescribed.  Do not do anything strenuous today. You may shower, but no tub baths or hot tubs today. You may resume your normal activities tomorrow, but do not “overdo it”. Resume normal activities slowly when you are feeling better.  If you experience redness, drainage or swelling at the injection site, or if you develop a fever above 100 degrees, please call [facility] at [phone] or go to the Emergency Room.  Continue to take all routine medicines prescribed by your primary care physician unless otherwise instructed by our staff. Most blood thinners should be started again according to your regularly scheduled dosing. If you have any questions, please give our office a call.    As no general anesthesia was used in today's procedure, you should not experience any side effects related to anesthesia.       If you have a problem specifically related to your procedure, please call our office at [phone].  Problems not related to your procedure should be directed to your primary care physician.

## 2025-01-15 NOTE — PROGRESS NOTES
" Universal Protocol:  procedure performed by consultantConsent: Verbal consent obtained. Written consent obtained.  Risks and benefits: risks, benefits and alternatives were discussed  Consent given by: patient  Time out: Immediately prior to procedure a \"time out\" was called to verify the correct patient, procedure, equipment, support staff and site/side marked as required.  Timeout called at: 1/15/2025 3:10 PM.  Patient understanding: patient states understanding of the procedure being performed  Patient consent: the patient's understanding of the procedure matches consent given  Procedure consent: procedure consent matches procedure scheduled  Relevant documents: relevant documents present and verified  Test results: test results available and properly labeled  Site marked: the operative site was marked  Radiology Images displayed and confirmed. If images not available, report reviewed: imaging studies not available  Required items: required blood products, implants, devices, and special equipment available  Patient identity confirmed: verbally with patient  Supporting Documentation  Indications: pain   Trigger Point Injections: multiple trigger points: 3 or more muscle groups   Procedure Details  Location(s):    Lower Back: R iliocostalis lumborum, R quadratus lumborum, L quadratus lumborum, L iliocostalis lumborum, L lumbar paraspinals and R lumbar paraspinals   Needle size: 22 G  Medications: 10 mL bupivacaine (PF) 0.25 %; 40 mg triamcinolone acetonide 40 mg/mL  Patient tolerance: patient tolerated the procedure well with no immediate complications          "

## 2025-01-17 ENCOUNTER — RESULTS FOLLOW-UP (OUTPATIENT)
Dept: INTERNAL MEDICINE CLINIC | Facility: CLINIC | Age: 65
End: 2025-01-17

## 2025-01-29 ENCOUNTER — TELEPHONE (OUTPATIENT)
Dept: PAIN MEDICINE | Facility: CLINIC | Age: 65
End: 2025-01-29

## 2025-01-31 ENCOUNTER — TELEPHONE (OUTPATIENT)
Age: 65
End: 2025-01-31

## 2025-01-31 NOTE — TELEPHONE ENCOUNTER
Patient called in, said she did an at home UTI test and it came back positive. She wants to know if urine labs can be put in, for her to get done after work. She wants to know if an antibiotic can be sent bc she says the results usually take a few days and she is using AZO but that is only good for 2 days. Please advise.    Dyan: [phone]

## 2025-02-03 ENCOUNTER — TELEPHONE (OUTPATIENT)
Dept: INTERNAL MEDICINE CLINIC | Facility: CLINIC | Age: 65
End: 2025-02-03

## 2025-02-03 NOTE — TELEPHONE ENCOUNTER
Pt called back, that day did not work for her due to work schedule. R/S for April and added to wait list. She just wanted to ask pcp should she still get the lab work done prior or wait until appt? Please advise.

## 2025-02-19 ENCOUNTER — OFFICE VISIT (OUTPATIENT)
Dept: PAIN MEDICINE | Facility: CLINIC | Age: 65
End: 2025-02-19
Payer: COMMERCIAL

## 2025-02-19 VITALS
HEIGHT: 69 IN | OXYGEN SATURATION: 99 % | WEIGHT: 150 LBS | TEMPERATURE: 98 F | HEART RATE: 83 BPM | RESPIRATION RATE: 16 BRPM | BODY MASS INDEX: 22.22 KG/M2

## 2025-02-19 DIAGNOSIS — G89.29 CHRONIC BILATERAL LOW BACK PAIN WITHOUT SCIATICA: Primary | ICD-10-CM

## 2025-02-19 DIAGNOSIS — G89.4 CHRONIC PAIN SYNDROME: ICD-10-CM

## 2025-02-19 DIAGNOSIS — M79.18 MYOFASCIAL PAIN SYNDROME: ICD-10-CM

## 2025-02-19 DIAGNOSIS — M54.50 CHRONIC BILATERAL LOW BACK PAIN WITHOUT SCIATICA: Primary | ICD-10-CM

## 2025-02-19 PROCEDURE — 99213 OFFICE O/P EST LOW 20 MIN: CPT | Performed by: ANESTHESIOLOGY

## 2025-02-19 NOTE — PATIENT INSTRUCTIONS
Patient Education     Core Strengthening Exercises on Back or on Hands and Knees   About this topic   Your core muscles are in your chest, back, buttock, and stomach area. They are your abdominal, back, and pelvis muscles. These muscles help keep your body stable when using your arms or legs. They also help with balance and posture. There are many exercises you can do to keep these muscles strong.  If you have back problems like a compression fracture or a ruptured disc, doing some of these exercises could make your problem worse. Some of these exercises may cause lower back pain.  General   Before starting with a program, ask your doctor if you are healthy enough to do these exercises. Your doctor may have you work with a , chiropractor, or physical therapist to make a safe exercise program to meet your needs.  Strengthening Exercises   Strengthening exercises keep your muscles firm and strong. Start by repeating each exercise 2 to 3 times. Work up to doing each exercise 10 times. Try to do the exercises 2 to 3 times each day. Hold each exercise for 3 to 5 seconds. Do all exercises slowly.  Hip lifts ? Lie on your back with your knees bent and feet flat on the floor. Tighten your stomach muscles and push your heels into the floor to lift your buttocks off the floor. Relax.  Pelvic tilts ? Lie on your back with your knees bent and feet flat on the floor. Tighten your stomach muscles and press your lower back down to the floor. Relax.  Straight leg raises lying down ? Lie on your back with one leg straight. Bend your other knee so the foot is flat on the bed. Keeping your leg straight, lift the leg up to the level of your other knee. Lower it back down. Repeat with the other leg.  Knee flex lying down ? Lie on your back with both knees bent and your feet flat on the floor. Tighten your belly muscles. Raise one leg up and back down as if you are marching in slow motion. Keep belly muscles tight while you move  your leg. Switch legs. To make this exercise harder, raise both arms straight up in the air. Tighten your belly muscles. When you raise one leg up, reach the opposite arm over your head. Switch, moving the opposite arm and leg until you have done 10 repetitions on each side.  Abdominal crunches ? Lie on your back with both knees bent. Keep your feet flat on the floor. Place your hands in one of these positions. Try starting with the first position since it is the easiest. As you get better, use the other positions to make it harder.  Crunches with arms at sides.  Crunches with arms across chest.  Crunches with arms behind head. Be careful not to interlock your fingers behind your neck or head while doing crunches. This may add tension to your neck and cause strain.  Look at the ceiling. Tighten your belly muscles and lift your shoulders and upper back off the floor. Breathe out while you are doing this. Lower your shoulders to the floor. Breathe in while you are doing this. Relax your belly muscles all the way before starting another crunch.  Arm and leg lifts on hands and knees ? Start on your hands and knees. With all of these exercises, keep your back as level as possible. If you are having trouble with this, you may want to put a small object on your back such as a book. If it falls off, you are not keeping your back level enough during the exercise.  Lift one arm up to shoulder level and hold. Lower it back down. Now, lift up the other arm and hold.  Lift one leg up and kick it straight out until it is in line with your back and hold. Lower it back down. Now, lift up the other leg and hold.  Lift one arm and the OPPOSITE leg up at the same time and hold. Lower them down. Now, repeat using the other arm and leg. This is a very hard exercise. It may take time to be able to do this.               What will the results be?   Stronger core  Better balance  More toned belly and back muscles  Easier to do daily  activities  Better sports performance  Less low back pain  Helpful tips   Stay active and work out to keep your muscles strong and flexible.  Keep a healthy weight to avoid putting too much stress on your spine. Eat a healthy diet to keep your muscles healthy.  Be sure you do not hold your breath when exercising. This can raise your blood pressure. If you tend to hold your breath, try counting out loud when exercising. If any exercise bothers you, stop right away.  Try walking or cycling at an easy pace for a few minutes to warm up your muscles. Do this again after exercising.  Exercise may be slightly uncomfortable, but you should not have sharp pains. If you do get sharp pains, stop what you are doing. If the sharp pains continue, call your doctor.  Last Reviewed Date   2021-03-18  Consumer Information Use and Disclaimer   This generalized information is a limited summary of diagnosis, treatment, and/or medication information. It is not meant to be comprehensive and should be used as a tool to help the user understand and/or assess potential diagnostic and treatment options. It does NOT include all information about conditions, treatments, medications, side effects, or risks that may apply to a specific patient. It is not intended to be medical advice or a substitute for the medical advice, diagnosis, or treatment of a health care provider based on the health care provider's examination and assessment of a patient’s specific and unique circumstances. Patients must speak with a health care provider for complete information about their health, medical questions, and treatment options, including any risks or benefits regarding use of medications. This information does not endorse any treatments or medications as safe, effective, or approved for treating a specific patient. [employer] and its affiliates disclaim any warranty or liability relating to this information or the use thereof. The use of this information  is governed by the Terms of Use, available at https://www.[employer].com/en/know/clinical-effectiveness-terms   Copyright   Copyright © 2024 [employer] and its affiliates and/or licensors. All rights reserved.

## 2025-02-19 NOTE — PROGRESS NOTES
Assessment:  1. Chronic bilateral low back pain without sciatica    2. Chronic pain syndrome    3. Myofascial pain syndrome        Plan:  Patient is a 65-year-old female complains of low back pain with chronic patient secondary lumbar degenerative disease, lumbar spondylosis, myofascial pain syndrome presents to office for follow-up visit.  Patient's status post ultrasound-guided lumbosacral trigger point injection with steroid which was performed on 1/15/2025 due to continues to report ongoing alleviation of symptoms.  1.  Follow-up as needed  2.  Continue home exercise regimen      History of Present Illness:  The patient is a 65 y.o. female who presents for a follow up office visit in regards to Back Pain.   The patient’s current symptoms include 5 out of 10 constant sharp pain in the low back without a particular time pattern.    Current pain medications includes: none   I have personally reviewed and/or updated the patient's past medical history, past surgical history, family history, social history, current medications, allergies, and vital signs today.         Review of Systems  Review of Systems   Musculoskeletal:  Positive for back pain.        Decreased ROM   All other systems reviewed and are negative.          Past Medical History:   Diagnosis Date    GERD (gastroesophageal reflux disease)     Headache(784.0) 10/01/2021    Hyperlipidemia     Urinary tract infection 8/10/2021       Past Surgical History:   Procedure Laterality Date    BLADDER SURGERY      ESOPHAGOGASTRODUODENOSCOPY  2013    Diagnostic    HERNIA REPAIR  8/2020    HYSTERECTOMY  2012    TN LAPS REPAIR HERNIA EXCEPT INCAL/INGUN REDUCIBLE N/A 09/18/2020    Procedure: REPAIR HERNIA VENTRAL & umbilical LAPAROSCOPIC;  Surgeon: Dayton Heredia DO;  Location: [facility];  Service: General       Family History   Problem Relation Age of Onset    Thyroid disease Mother         Born without a thyroid    No Known Problems Father     No Known Problems  "Sister     No Known Problems Maternal Grandmother     No Known Problems Maternal Grandfather     No Known Problems Paternal Grandmother     No Known Problems Paternal Grandfather     No Known Problems Sister     No Known Problems Sister     No Known Problems Maternal Aunt     No Known Problems Maternal Aunt        Social History     Occupational History    Not on file   Tobacco Use    Smoking status: Former     Current packs/day: 0.00     Types: Cigarettes     Quit date: 2019     Years since quittin.7    Smokeless tobacco: Never   Vaping Use    Vaping status: Never Used   Substance and Sexual Activity    Alcohol use: Yes     Alcohol/week: 4.0 standard drinks of alcohol     Types: 4 Shots of liquor per week     Comment: Once a week    Drug use: No    Sexual activity: Not Currently     Partners: Male     Birth control/protection: Surgical, None     Comment: Hysterectomy         Current Outpatient Medications:     Cranberry (CRANBEREX PO), Take by mouth, Disp: , Rfl:     levothyroxine (Synthroid) 25 mcg tablet, Take 1 tablet (25 mcg total) by mouth daily in the early morning, Disp: 90 tablet, Rfl: 3    methocarbamol (ROBAXIN) 500 mg tablet, Take 1 tablet (500 mg total) by mouth 3 (three) times a day as needed for muscle spasms, Disp: 90 tablet, Rfl: 0    multivitamin (THERAGRAN) TABS, Take 1 tablet by mouth daily, Disp: , Rfl:     pantoprazole (PROTONIX) 40 mg tablet, Take 1 tablet (40 mg total) by mouth daily, Disp: 90 tablet, Rfl: 3    simvastatin (ZOCOR) 40 mg tablet, Take 1 tablet (40 mg total) by mouth every evening, Disp: 90 tablet, Rfl: 3    Turmeric 400 MG CAPS, Take 800 mg by mouth 2 (two) times a day, Disp: , Rfl:     valsartan (DIOVAN) 40 mg tablet, Take 1 tablet (40 mg total) by mouth daily, Disp: 90 tablet, Rfl: 3    No Known Allergies    Physical Exam:    Pulse 83   Temp 98 °F (36.7 °C)   Resp 16   Ht 5' 9\" (1.753 m)   Wt 68 kg (150 lb)   SpO2 99%   BMI 22.15 kg/m² "     Constitutional:normal, well developed, well nourished, alert, in no distress and non-toxic and no overt pain behavior.  Eyes:anicteric  HEENT:grossly intact  Neck:supple, symmetric, trachea midline and no masses   Pulmonary:even and unlabored  Cardiovascular:No edema or pitting edema present  Skin:Normal without rashes or lesions and well hydrated  Psychiatric:Mood and affect appropriate  Neurologic:Cranial Nerves II-XII grossly intact  Musculoskeletal:normal    Imaging        Study Result    Narrative & Impression   MRI LUMBAR SPINE WITHOUT CONTRAST     INDICATION: M54.50: Low back pain, unspecified  G89.29: Other chronic pain.     COMPARISON: X-ray dated 1/22/2019 and CT abdomen/pelvis dated 4/3/2024.     TECHNIQUE:  Multiplanar, multisequence imaging of the lumbar spine was performed. .        IMAGE QUALITY:  Diagnostic     FINDINGS:     VERTEBRAL BODIES:  There are 5 lumbar type vertebral bodies. Minimal degenerative retrolisthesis at L1-2, L2-3 and L3-4. No spondylolysis. Mild dextroscoliosis. No compression. Multilevel  Modic type I and type II degenerative changes with Modic type I   changes most pronounced at L2-3 and L4-5. No suspicious marrow signal abnormality.     SACRUM:  Normal signal within the sacrum. No evidence of insufficiency or stress fracture. Tarlov cysts are noted.     DISTAL CORD AND CONUS:  Normal size and signal within the distal cord and conus.     PARASPINAL SOFT TISSUES:  Paraspinal soft tissues are unremarkable.     LOWER THORACIC DISC SPACES: Stable focal ossification of the posterior longitudinal ligament at T10-11 causing mild canal stenosis. Small disc bulges without significant canal stenosis at T11-12 and T12-L1.     LUMBAR DISC SPACES:     L1-L2: Small disc bulge. No significant canal or foraminal stenosis.     L2-L3: Small disc bulge and marginal osteophyte with mild facet arthropathy. Mild canal stenosis. Mild left foraminal stenosis.     L3-L4: Disc osteophyte complex and  facet arthropathy. Mild canal stenosis. Subarticular stenosis. Moderate right and mild left foraminal stenosis.     L4-L5: Disc bulge and marginal osteophyte asymmetric to the right. Facet arthropathy. No significant central canal stenosis. Right subarticular/lateral recess stenosis, correlate for right L5 radiculopathy. Moderate right and mild left foraminal   stenosis.     L5-S1: Disc bulge and marginal osteophyte asymmetric to the left. Facet arthropathy. No significant canal stenosis. Moderate foraminal stenosis.     OTHER FINDINGS: Stable 1 cm left renal cyst and stable 1 cm left adrenal nodule.     IMPRESSION:     Multilevel degenerative spondylosis as described.        Workstation performed: [id number]              No orders to display       No orders of the defined types were placed in this encounter.

## 2025-03-13 DIAGNOSIS — M54.50 CHRONIC BILATERAL LOW BACK PAIN WITHOUT SCIATICA: ICD-10-CM

## 2025-03-13 DIAGNOSIS — G89.29 CHRONIC BILATERAL LOW BACK PAIN WITHOUT SCIATICA: ICD-10-CM

## 2025-03-17 RX ORDER — METHOCARBAMOL 500 MG/1
500 TABLET, FILM COATED ORAL 3 TIMES DAILY PRN
Qty: 200 TABLET | Refills: 0 | Status: SHIPPED | OUTPATIENT
Start: 2025-03-17 | End: 2025-03-18 | Stop reason: SDUPTHER

## 2025-03-18 DIAGNOSIS — M54.50 CHRONIC BILATERAL LOW BACK PAIN WITHOUT SCIATICA: ICD-10-CM

## 2025-03-18 DIAGNOSIS — G89.29 CHRONIC BILATERAL LOW BACK PAIN WITHOUT SCIATICA: ICD-10-CM

## 2025-03-20 ENCOUNTER — TELEPHONE (OUTPATIENT)
Dept: PODIATRY | Facility: CLINIC | Age: 65
End: 2025-03-20

## 2025-03-20 RX ORDER — METHOCARBAMOL 500 MG/1
500 TABLET, FILM COATED ORAL 3 TIMES DAILY PRN
Qty: 200 TABLET | Refills: 0 | Status: SHIPPED | OUTPATIENT
Start: 2025-03-20

## 2025-04-01 ENCOUNTER — TELEPHONE (OUTPATIENT)
Age: 65
End: 2025-04-01

## 2025-04-01 NOTE — TELEPHONE ENCOUNTER
Patient is asking for a referral for [facility]. She states she has a cyst on her knuckles on her left had. Please notify patient once referral has been placed

## 2025-04-02 ENCOUNTER — TELEPHONE (OUTPATIENT)
Dept: INTERNAL MEDICINE CLINIC | Facility: CLINIC | Age: 65
End: 2025-04-02

## 2025-04-02 DIAGNOSIS — M85.642 CYST OF BONE OF LEFT HAND: Primary | ICD-10-CM

## 2025-04-10 ENCOUNTER — APPOINTMENT (OUTPATIENT)
Dept: LAB | Facility: HOSPITAL | Age: 65
End: 2025-04-10
Payer: COMMERCIAL

## 2025-04-10 ENCOUNTER — OFFICE VISIT (OUTPATIENT)
Dept: INTERNAL MEDICINE CLINIC | Facility: CLINIC | Age: 65
End: 2025-04-10
Payer: COMMERCIAL

## 2025-04-10 VITALS
DIASTOLIC BLOOD PRESSURE: 76 MMHG | TEMPERATURE: 98 F | WEIGHT: 140.9 LBS | BODY MASS INDEX: 20.87 KG/M2 | OXYGEN SATURATION: 97 % | HEIGHT: 69 IN | SYSTOLIC BLOOD PRESSURE: 132 MMHG | HEART RATE: 70 BPM

## 2025-04-10 DIAGNOSIS — K21.9 GASTROESOPHAGEAL REFLUX DISEASE WITHOUT ESOPHAGITIS: ICD-10-CM

## 2025-04-10 DIAGNOSIS — E03.9 ACQUIRED HYPOTHYROIDISM: ICD-10-CM

## 2025-04-10 DIAGNOSIS — I10 ESSENTIAL HYPERTENSION: ICD-10-CM

## 2025-04-10 DIAGNOSIS — Z78.0 POSTMENOPAUSAL: ICD-10-CM

## 2025-04-10 DIAGNOSIS — Z12.31 VISIT FOR SCREENING MAMMOGRAM: ICD-10-CM

## 2025-04-10 DIAGNOSIS — R30.0 DYSURIA: ICD-10-CM

## 2025-04-10 DIAGNOSIS — I10 ESSENTIAL HYPERTENSION: Primary | ICD-10-CM

## 2025-04-10 DIAGNOSIS — M67.442 GANGLION OF LEFT HAND: ICD-10-CM

## 2025-04-10 DIAGNOSIS — E78.49 OTHER HYPERLIPIDEMIA: ICD-10-CM

## 2025-04-10 DIAGNOSIS — M54.50 CHRONIC BILATERAL LOW BACK PAIN WITHOUT SCIATICA: ICD-10-CM

## 2025-04-10 DIAGNOSIS — E11.9 TYPE 2 DIABETES MELLITUS WITHOUT COMPLICATION, WITHOUT LONG-TERM CURRENT USE OF INSULIN (HCC): ICD-10-CM

## 2025-04-10 DIAGNOSIS — G89.29 CHRONIC BILATERAL LOW BACK PAIN WITHOUT SCIATICA: ICD-10-CM

## 2025-04-10 DIAGNOSIS — R63.4 WEIGHT LOSS: ICD-10-CM

## 2025-04-10 LAB
ALBUMIN SERPL BCG-MCNC: 4.6 G/DL (ref 3.5–5)
ALP SERPL-CCNC: 39 U/L (ref 34–104)
ALT SERPL W P-5'-P-CCNC: 22 U/L (ref 7–52)
ANION GAP SERPL CALCULATED.3IONS-SCNC: 7 MMOL/L (ref 4–13)
AST SERPL W P-5'-P-CCNC: 19 U/L (ref 13–39)
BASOPHILS # BLD AUTO: 0.04 THOUSANDS/ÂΜL (ref 0–0.1)
BASOPHILS NFR BLD AUTO: 1 % (ref 0–1)
BILIRUB SERPL-MCNC: 0.47 MG/DL (ref 0.2–1)
BILIRUB UR QL STRIP: NEGATIVE
BUN SERPL-MCNC: 18 MG/DL (ref 5–25)
CALCIUM SERPL-MCNC: 10 MG/DL (ref 8.4–10.2)
CHLORIDE SERPL-SCNC: 98 MMOL/L (ref 96–108)
CHOLEST SERPL-MCNC: 195 MG/DL (ref ?–200)
CLARITY UR: CLEAR
CO2 SERPL-SCNC: 30 MMOL/L (ref 21–32)
COLOR UR: YELLOW
CREAT SERPL-MCNC: 0.53 MG/DL (ref 0.6–1.3)
EOSINOPHIL # BLD AUTO: 0.07 THOUSAND/ÂΜL (ref 0–0.61)
EOSINOPHIL NFR BLD AUTO: 1 % (ref 0–6)
ERYTHROCYTE [DISTWIDTH] IN BLOOD BY AUTOMATED COUNT: 12.5 % (ref 11.6–15.1)
EST. AVERAGE GLUCOSE BLD GHB EST-MCNC: 131 MG/DL
GFR SERPL CREATININE-BSD FRML MDRD: 99 ML/MIN/1.73SQ M
GLUCOSE P FAST SERPL-MCNC: 106 MG/DL (ref 65–99)
GLUCOSE UR STRIP-MCNC: NEGATIVE MG/DL
HBA1C MFR BLD: 6.2 %
HCT VFR BLD AUTO: 40.6 % (ref 34.8–46.1)
HDLC SERPL-MCNC: 65 MG/DL
HGB BLD-MCNC: 13.7 G/DL (ref 11.5–15.4)
HGB UR QL STRIP.AUTO: NEGATIVE
IMM GRANULOCYTES # BLD AUTO: 0.03 THOUSAND/UL (ref 0–0.2)
IMM GRANULOCYTES NFR BLD AUTO: 1 % (ref 0–2)
KETONES UR STRIP-MCNC: NEGATIVE MG/DL
LDLC SERPL CALC-MCNC: 115 MG/DL (ref 0–100)
LEUKOCYTE ESTERASE UR QL STRIP: NEGATIVE
LYMPHOCYTES # BLD AUTO: 2.1 THOUSANDS/ÂΜL (ref 0.6–4.47)
LYMPHOCYTES NFR BLD AUTO: 36 % (ref 14–44)
MCH RBC QN AUTO: 33.1 PG (ref 26.8–34.3)
MCHC RBC AUTO-ENTMCNC: 33.7 G/DL (ref 31.4–37.4)
MCV RBC AUTO: 98 FL (ref 82–98)
MONOCYTES # BLD AUTO: 0.44 THOUSAND/ÂΜL (ref 0.17–1.22)
MONOCYTES NFR BLD AUTO: 8 % (ref 4–12)
NEUTROPHILS # BLD AUTO: 3.19 THOUSANDS/ÂΜL (ref 1.85–7.62)
NEUTS SEG NFR BLD AUTO: 53 % (ref 43–75)
NITRITE UR QL STRIP: NEGATIVE
NONHDLC SERPL-MCNC: 130 MG/DL
NRBC BLD AUTO-RTO: 0 /100 WBCS
PH UR STRIP.AUTO: 7.5 [PH]
PLATELET # BLD AUTO: 296 THOUSANDS/UL (ref 149–390)
PMV BLD AUTO: 8.9 FL (ref 8.9–12.7)
POTASSIUM SERPL-SCNC: 4.5 MMOL/L (ref 3.5–5.3)
PROT SERPL-MCNC: 7.3 G/DL (ref 6.4–8.4)
PROT UR STRIP-MCNC: NEGATIVE MG/DL
RBC # BLD AUTO: 4.14 MILLION/UL (ref 3.81–5.12)
SODIUM SERPL-SCNC: 135 MMOL/L (ref 135–147)
SP GR UR STRIP.AUTO: 1.01 (ref 1–1.03)
TRIGL SERPL-MCNC: 75 MG/DL (ref ?–150)
TSH SERPL DL<=0.05 MIU/L-ACNC: 2.51 UIU/ML (ref 0.45–4.5)
UROBILINOGEN UR STRIP-ACNC: <2 MG/DL
WBC # BLD AUTO: 5.87 THOUSAND/UL (ref 4.31–10.16)

## 2025-04-10 PROCEDURE — 83036 HEMOGLOBIN GLYCOSYLATED A1C: CPT

## 2025-04-10 PROCEDURE — 99214 OFFICE O/P EST MOD 30 MIN: CPT | Performed by: FAMILY MEDICINE

## 2025-04-10 PROCEDURE — 80053 COMPREHEN METABOLIC PANEL: CPT

## 2025-04-10 PROCEDURE — 99397 PER PM REEVAL EST PAT 65+ YR: CPT | Performed by: FAMILY MEDICINE

## 2025-04-10 PROCEDURE — 80061 LIPID PANEL: CPT

## 2025-04-10 PROCEDURE — 81003 URINALYSIS AUTO W/O SCOPE: CPT

## 2025-04-10 PROCEDURE — 87086 URINE CULTURE/COLONY COUNT: CPT

## 2025-04-10 PROCEDURE — 36415 COLL VENOUS BLD VENIPUNCTURE: CPT

## 2025-04-10 PROCEDURE — 84443 ASSAY THYROID STIM HORMONE: CPT

## 2025-04-10 PROCEDURE — 85025 COMPLETE CBC W/AUTO DIFF WBC: CPT

## 2025-04-10 NOTE — PROGRESS NOTES
Adult Annual Physical  Name: Mera Encinas      : 1960      MRN: [id number]  Encounter Provider: Kaylee yBrd MD  Encounter Date: 4/10/2025   [facility]    :  Assessment & Plan  Essential hypertension  Blood pressure well-controlled.  Continue with the valsartan.  Watch salt intake.  Stay adequately hydrated.  Orders:  •  CBC and differential; Future    Acquired hypothyroidism  TSH currently stable.  Continue current dose of levothyroxine.  Will continue to follow TSH with routine laboratory testing and adjust dosing if needed.       Type 2 diabetes mellitus without complication, without long-term current use of insulin (HCC)  Hemoglobin A1c controlled on diet alone.  Watch carbohydrate intake.  Remain as active as possible.  Recommend regular follow-up with ophthalmology.  Check feet daily.  Lab Results   Component Value Date    HGBA1C 6.2 (H) 04/10/2025       Orders:  •  Ambulatory Referral to Ophthalmology; Future  •  Hemoglobin A1C; Future    Chronic bilateral low back pain without sciatica  Continue to follow-up with pain management.  Chronic back issues discussed.  Orders:  •  CBC and differential; Future    Other hyperlipidemia  Continue with simvastatin.  Increase fiber in diet.  Orders:  •  Comprehensive metabolic panel; Future  •  Lipid panel; Future  •  TSH, 3rd generation; Future    Ganglion of left hand  Follow-up with hand specialist as scheduled.       Visit for screening mammogram    Orders:  •  Mammo screening bilateral w 3d and cad; Future    Postmenopausal    Orders:  •  DXA bone density spine hip and pelvis; Future    Weight loss  Weight loss discussed.  Advised her to follow her weight at least weekly at home.  Advised her to contact the office if she continues to lose weight because further investigation would be necessary.           Preventive Screenings:  - Diabetes Screening: screening not indicated and has diabetes  -  Cholesterol Screening: screening not indicated and has hyperlipidemia   - Cervical cancer screening: screening not indicated   - Breast cancer screening: screening up-to-date, risks/benefits discussed and orders placed   - Colon cancer screening: screening up-to-date   - Lung cancer screening: screening not indicated   - Osteoporosis screening: risks/benefits discussed and orders placed     Immunizations:  - Immunizations due: Influenza, Prevnar 20 and Zoster (Shingrix)  - The patient declines recommended vaccines currently despite my recommendations      Counseling/Anticipatory Guidance:  - Alcohol: discussed moderation in alcohol intake and recommendations for healthy alcohol use.   - Dental health: discussed importance of regular tooth brushing, flossing, and dental visits.   - Diet: discussed recommendations for a healthy/well-balanced diet.   - Exercise: the importance of regular exercise/physical activity was discussed. Recommend exercise 3-5 times per week for at least 30 minutes.   - Injury prevention: discussed safety/seat belts, safety helmets, smoke detectors, carbon monoxide detectors, and smoking near bedding or upholstery.       Depression Screening and Follow-up Plan: Patient was screened for depression during today's encounter. They screened negative with a PHQ-2 score of 0.          History of Present Illness     Adult Annual Physical:  Patient presents for annual physical. She presents for routine follow-up as well as wellness visit.  Has generally been feeling relatively well.  She has had a few issues that are affecting her overall.  She continues with significant back pain.  This has worsened again recently.  Does have follow-up scheduled over the next few weeks with pain management.  She also has been having issues with her arthritis into her hands.  Developed swelling and significant pain over the left dorsal aspect of the fifth MCP.  Has a cystic-like area there that she thinks is a ganglion.   Has been very painful.  No known injury.  Tolerating her valsartan without difficulty.  Denies any headaches or localized weakness.  No chest pain or palpitations.  Tolerating her levothyroxine well.  Continues to work long hours.  Tolerating simvastatin without difficulty.  She has lost some weight but does not know why.  She denies any significant changes in her diet.  Her  has changed his diet significantly and has lost weight but she does not feel that she has changed anything significantly.  Denies any GI issues.  Denies any nausea.  Denies any abdominal pain or changes in bowel movements.  Bowel movements are usually loose but this is chronic..     Diet and Physical Activity:  - Diet/Nutrition: diabetic diet.  - Exercise: walking.    Depression Screening:  - PHQ-2 Score: 0    General Health:    - Hearing: decreased hearing bilateral ears.  - Vision: no vision problems.  - Dental: regular dental visits.    /GYN Health:    - Menopause: postmenopausal.     Review of Systems   Constitutional:  Positive for fatigue and unexpected weight change. Negative for activity change, appetite change, chills and fever.   HENT:  Negative for congestion and rhinorrhea.    Eyes:  Negative for visual disturbance.   Respiratory:  Negative for chest tightness and shortness of breath.    Cardiovascular:  Negative for chest pain and palpitations.   Gastrointestinal:  Negative for abdominal pain, blood in stool, diarrhea, nausea and vomiting.   Endocrine: Negative for polydipsia, polyphagia and polyuria.   Genitourinary:  Negative for dysuria, frequency and urgency.   Musculoskeletal:  Positive for arthralgias, back pain and joint swelling. Negative for gait problem.   Skin:  Negative for color change.   Neurological:  Negative for dizziness and headaches.   Hematological:  Does not bruise/bleed easily.   Psychiatric/Behavioral:  Negative for confusion and sleep disturbance. The patient is not nervous/anxious.   "        Objective   /76 (BP Location: Left arm, Patient Position: Sitting, Cuff Size: Standard)   Pulse 70   Temp 98 °F (36.7 °C)   Ht 5' 9\" (1.753 m)   Wt 63.9 kg (140 lb 14.4 oz)   SpO2 97%   BMI 20.81 kg/m²     Physical Exam  Vitals and nursing note reviewed.   Constitutional:       General: She is not in acute distress.     Appearance: She is well-developed and well-groomed.   HENT:      Head: Normocephalic and atraumatic.   Eyes:      General:         Right eye: No discharge.         Left eye: No discharge.      Conjunctiva/sclera: Conjunctivae normal.      Pupils: Pupils are equal, round, and reactive to light.   Cardiovascular:      Rate and Rhythm: Normal rate and regular rhythm.      Heart sounds: Normal heart sounds. No murmur heard.     No friction rub. No gallop.   Pulmonary:      Effort: No respiratory distress.      Breath sounds: No wheezing or rales.   Abdominal:      General: Bowel sounds are normal. There is no distension.      Tenderness: There is no abdominal tenderness.   Musculoskeletal:      Comments: Swelling over the dorsal aspect of the left fifth MCP area consistent with likely ganglion   Lymphadenopathy:      Cervical: No cervical adenopathy.   Skin:     General: Skin is warm and dry.   Neurological:      Mental Status: She is alert and oriented to person, place, and time.   Psychiatric:         Mood and Affect: Mood and affect normal.         Speech: Speech normal.         Behavior: Behavior normal. Behavior is cooperative.         Cognition and Memory: Cognition and memory normal.         "

## 2025-04-11 PROBLEM — M67.442 GANGLION OF LEFT HAND: Status: ACTIVE | Noted: 2025-04-11

## 2025-04-11 PROBLEM — R63.4 WEIGHT LOSS: Status: ACTIVE | Noted: 2025-04-11

## 2025-04-11 LAB — BACTERIA UR CULT: NORMAL

## 2025-04-11 NOTE — ASSESSMENT & PLAN NOTE
Continue with simvastatin.  Increase fiber in diet.  Orders:  •  Comprehensive metabolic panel; Future  •  Lipid panel; Future  •  TSH, 3rd generation; Future

## 2025-04-11 NOTE — ASSESSMENT & PLAN NOTE
Weight loss discussed.  Advised her to follow her weight at least weekly at home.  Advised her to contact the office if she continues to lose weight because further investigation would be necessary.

## 2025-04-11 NOTE — ASSESSMENT & PLAN NOTE
TSH currently stable.  Continue current dose of levothyroxine.  Will continue to follow TSH with routine laboratory testing and adjust dosing if needed.

## 2025-04-11 NOTE — ASSESSMENT & PLAN NOTE
Continue to follow-up with pain management.  Chronic back issues discussed.  Orders:  •  CBC and differential; Future

## 2025-04-11 NOTE — ASSESSMENT & PLAN NOTE
Hemoglobin A1c controlled on diet alone.  Watch carbohydrate intake.  Remain as active as possible.  Recommend regular follow-up with ophthalmology.  Check feet daily.  Lab Results   Component Value Date    HGBA1C 6.2 (H) 04/10/2025       Orders:  •  Ambulatory Referral to Ophthalmology; Future  •  Hemoglobin A1C; Future

## 2025-04-11 NOTE — ASSESSMENT & PLAN NOTE
Blood pressure well-controlled.  Continue with the valsartan.  Watch salt intake.  Stay adequately hydrated.  Orders:  •  CBC and differential; Future

## 2025-04-30 ENCOUNTER — OFFICE VISIT (OUTPATIENT)
Dept: PAIN MEDICINE | Facility: CLINIC | Age: 65
End: 2025-04-30
Payer: COMMERCIAL

## 2025-04-30 VITALS
HEART RATE: 78 BPM | TEMPERATURE: 97.8 F | WEIGHT: 141 LBS | HEIGHT: 69 IN | RESPIRATION RATE: 16 BRPM | BODY MASS INDEX: 20.88 KG/M2 | OXYGEN SATURATION: 98 %

## 2025-04-30 DIAGNOSIS — G89.29 CHRONIC BILATERAL LOW BACK PAIN WITHOUT SCIATICA: ICD-10-CM

## 2025-04-30 DIAGNOSIS — G89.4 CHRONIC PAIN SYNDROME: ICD-10-CM

## 2025-04-30 DIAGNOSIS — M79.18 MYOFASCIAL PAIN SYNDROME: Primary | ICD-10-CM

## 2025-04-30 DIAGNOSIS — M54.50 CHRONIC BILATERAL LOW BACK PAIN WITHOUT SCIATICA: ICD-10-CM

## 2025-04-30 PROCEDURE — 99214 OFFICE O/P EST MOD 30 MIN: CPT | Performed by: ANESTHESIOLOGY

## 2025-04-30 RX ORDER — BACLOFEN 10 MG/1
10 TABLET ORAL 3 TIMES DAILY
Qty: 90 TABLET | Refills: 1 | Status: SHIPPED | OUTPATIENT
Start: 2025-04-30 | End: 2025-05-30

## 2025-04-30 NOTE — PATIENT INSTRUCTIONS
Patient Education     Core Strengthening Exercises on Back or on Hands and Knees   About this topic   Your core muscles are in your chest, back, buttock, and stomach area. They are your abdominal, back, and pelvis muscles. These muscles help keep your body stable when using your arms or legs. They also help with balance and posture. There are many exercises you can do to keep these muscles strong.  If you have back problems like a compression fracture or a ruptured disc, doing some of these exercises could make your problem worse. Some of these exercises may cause lower back pain.  General   Before starting with a program, ask your doctor if you are healthy enough to do these exercises. Your doctor may have you work with a , chiropractor, or physical therapist to make a safe exercise program to meet your needs.  Strengthening Exercises   Strengthening exercises keep your muscles firm and strong. Start by repeating each exercise 2 to 3 times. Work up to doing each exercise 10 times. Try to do the exercises 2 to 3 times each day. Hold each exercise for 3 to 5 seconds. Do all exercises slowly.  Hip lifts ? Lie on your back with your knees bent and feet flat on the floor. Tighten your stomach muscles and push your heels into the floor to lift your buttocks off the floor. Relax.  Pelvic tilts ? Lie on your back with your knees bent and feet flat on the floor. Tighten your stomach muscles and press your lower back down to the floor. Relax.  Straight leg raises lying down ? Lie on your back with one leg straight. Bend your other knee so the foot is flat on the bed. Keeping your leg straight, lift the leg up to the level of your other knee. Lower it back down. Repeat with the other leg.  Knee flex lying down ? Lie on your back with both knees bent and your feet flat on the floor. Tighten your belly muscles. Raise one leg up and back down as if you are marching in slow motion. Keep belly muscles tight while you move  your leg. Switch legs. To make this exercise harder, raise both arms straight up in the air. Tighten your belly muscles. When you raise one leg up, reach the opposite arm over your head. Switch, moving the opposite arm and leg until you have done 10 repetitions on each side.  Abdominal crunches ? Lie on your back with both knees bent. Keep your feet flat on the floor. Place your hands in one of these positions. Try starting with the first position since it is the easiest. As you get better, use the other positions to make it harder.  Crunches with arms at sides.  Crunches with arms across chest.  Crunches with arms behind head. Be careful not to interlock your fingers behind your neck or head while doing crunches. This may add tension to your neck and cause strain.  Look at the ceiling. Tighten your belly muscles and lift your shoulders and upper back off the floor. Breathe out while you are doing this. Lower your shoulders to the floor. Breathe in while you are doing this. Relax your belly muscles all the way before starting another crunch.  Arm and leg lifts on hands and knees ? Start on your hands and knees. With all of these exercises, keep your back as level as possible. If you are having trouble with this, you may want to put a small object on your back such as a book. If it falls off, you are not keeping your back level enough during the exercise.  Lift one arm up to shoulder level and hold. Lower it back down. Now, lift up the other arm and hold.  Lift one leg up and kick it straight out until it is in line with your back and hold. Lower it back down. Now, lift up the other leg and hold.  Lift one arm and the OPPOSITE leg up at the same time and hold. Lower them down. Now, repeat using the other arm and leg. This is a very hard exercise. It may take time to be able to do this.               What will the results be?   Stronger core  Better balance  More toned belly and back muscles  Easier to do daily  activities  Better sports performance  Less low back pain  Helpful tips   Stay active and work out to keep your muscles strong and flexible.  Keep a healthy weight to avoid putting too much stress on your spine. Eat a healthy diet to keep your muscles healthy.  Be sure you do not hold your breath when exercising. This can raise your blood pressure. If you tend to hold your breath, try counting out loud when exercising. If any exercise bothers you, stop right away.  Try walking or cycling at an easy pace for a few minutes to warm up your muscles. Do this again after exercising.  Exercise may be slightly uncomfortable, but you should not have sharp pains. If you do get sharp pains, stop what you are doing. If the sharp pains continue, call your doctor.  Last Reviewed Date   2021-03-18  Consumer Information Use and Disclaimer   This generalized information is a limited summary of diagnosis, treatment, and/or medication information. It is not meant to be comprehensive and should be used as a tool to help the user understand and/or assess potential diagnostic and treatment options. It does NOT include all information about conditions, treatments, medications, side effects, or risks that may apply to a specific patient. It is not intended to be medical advice or a substitute for the medical advice, diagnosis, or treatment of a health care provider based on the health care provider's examination and assessment of a patient’s specific and unique circumstances. Patients must speak with a health care provider for complete information about their health, medical questions, and treatment options, including any risks or benefits regarding use of medications. This information does not endorse any treatments or medications as safe, effective, or approved for treating a specific patient. [employer] and its affiliates disclaim any warranty or liability relating to this information or the use thereof. The use of this information  is governed by the Terms of Use, available at https://www.woltersjaja.tvuwer.com/en/know/clinical-effectiveness-terms   Copyright   Copyright © 2024 [employer] and its affiliates and/or licensors. All rights reserved.

## 2025-04-30 NOTE — PROGRESS NOTES
"Assessment:  1. Myofascial pain syndrome    2. Chronic pain syndrome    3. Chronic bilateral low back pain without sciatica        Plan:  Patient is a 65-year-old female complains of low back pain with chronic patient secondary lumbar degenerative disease, lumbar spondylosis, myofascial pain syndrome presents to office for follow-up visit.  Patient has had trigger point injection provided with significant relief however she does note some popping sensation in her back that she \"presses on it back we did discuss chiropractic therapy however we feel at this time that physical therapy is the best mode for patient's relief.  1.  Patient will continue home exercise program for the lumbar spine strengthening.  Patient will perform exercises 5 days a week 3 sets 12 repetitions of each exercise.  2.  We will schedule trigger point injection if patient cannot participate in physical strengthening exercises  3.  Will trial baclofen 10 mg p.o. 3 times daily for myofascial pain  4.  Follow-up in 2 months        History of Present Illness:  The patient is a 65 y.o. female who presents for a follow up office visit in regards to Back Pain.   The patient’s current symptoms include 8 out of 10 constant dull/aching pain without particular time pattern.    Current pain medications includes: None.      I have personally reviewed and/or updated the patient's past medical history, past surgical history, family history, social history, current medications, allergies, and vital signs today.         Review of Systems  Review of Systems   Musculoskeletal:  Positive for back pain and gait problem.   All other systems reviewed and are negative.          Past Medical History:   Diagnosis Date    GERD (gastroesophageal reflux disease)     Headache(784.0) 10/01/2021    Hyperlipidemia     Urinary tract infection 8/10/2021       Past Surgical History:   Procedure Laterality Date    BLADDER SURGERY      ESOPHAGOGASTRODUODENOSCOPY  2013    Diagnostic "    HERNIA REPAIR  2020    HYSTERECTOMY  2012    MA LAPS REPAIR HERNIA EXCEPT INCAL/INGUN REDUCIBLE N/A 2020    Procedure: REPAIR HERNIA VENTRAL & umbilical LAPAROSCOPIC;  Surgeon: Dayton Heredia DO;  Location: [facility];  Service: General       Family History   Problem Relation Age of Onset    Thyroid disease Mother         Born without a thyroid    No Known Problems Father     No Known Problems Sister     No Known Problems Maternal Grandmother     No Known Problems Maternal Grandfather     No Known Problems Paternal Grandmother     No Known Problems Paternal Grandfather     No Known Problems Sister     No Known Problems Sister     No Known Problems Maternal Aunt     No Known Problems Maternal Aunt        Social History     Occupational History    Not on file   Tobacco Use    Smoking status: Former     Current packs/day: 0.00     Types: Cigarettes     Quit date: 2019     Years since quittin.9    Smokeless tobacco: Never   Vaping Use    Vaping status: Never Used   Substance and Sexual Activity    Alcohol use: Yes     Alcohol/week: 4.0 standard drinks of alcohol     Types: 4 Shots of liquor per week     Comment: Once a week    Drug use: No    Sexual activity: Not Currently     Partners: Male     Birth control/protection: Surgical, None     Comment: Hysterectomy         Current Outpatient Medications:     Cranberry (CRANBEREX PO), Take by mouth, Disp: , Rfl:     levothyroxine (Synthroid) 25 mcg tablet, Take 1 tablet (25 mcg total) by mouth daily in the early morning, Disp: 90 tablet, Rfl: 3    methocarbamol (ROBAXIN) 500 mg tablet, Take 1 tablet (500 mg total) by mouth 3 (three) times a day as needed for muscle spasms, Disp: 200 tablet, Rfl: 0    multivitamin (THERAGRAN) TABS, Take 1 tablet by mouth daily, Disp: , Rfl:     pantoprazole (PROTONIX) 40 mg tablet, Take 1 tablet (40 mg total) by mouth daily, Disp: 90 tablet, Rfl: 3    simvastatin (ZOCOR) 40 mg tablet, Take 1 tablet (40 mg total) by mouth  "every evening, Disp: 90 tablet, Rfl: 3    Turmeric 400 MG CAPS, Take 800 mg by mouth 2 (two) times a day, Disp: , Rfl:     valsartan (DIOVAN) 40 mg tablet, Take 1 tablet (40 mg total) by mouth daily, Disp: 90 tablet, Rfl: 3    No Known Allergies    Physical Exam:    Resp 16   Ht 5' 9\" (1.753 m)   BMI 20.81 kg/m²     Constitutional:normal, well developed, well nourished, alert, in no distress and non-toxic and no overt pain behavior.  Eyes:anicteric  HEENT:grossly intact  Neck:supple, symmetric, trachea midline and no masses   Pulmonary:even and unlabored  Cardiovascular:No edema or pitting edema present  Skin:Normal without rashes or lesions and well hydrated  Psychiatric:Mood and affect appropriate  Neurologic:Cranial Nerves II-XII grossly intact  Musculoskeletal:antalgic    Imaging  MRI LUMBAR SPINE WITHOUT CONTRAST     INDICATION: M54.50: Low back pain, unspecified  G89.29: Other chronic pain.     COMPARISON: X-ray dated 1/22/2019 and CT abdomen/pelvis dated 4/3/2024.     TECHNIQUE:  Multiplanar, multisequence imaging of the lumbar spine was performed. .        IMAGE QUALITY:  Diagnostic     FINDINGS:     VERTEBRAL BODIES:  There are 5 lumbar type vertebral bodies. Minimal degenerative retrolisthesis at L1-2, L2-3 and L3-4. No spondylolysis. Mild dextroscoliosis. No compression. Multilevel  Modic type I and type II degenerative changes with Modic type I   changes most pronounced at L2-3 and L4-5. No suspicious marrow signal abnormality.     SACRUM:  Normal signal within the sacrum. No evidence of insufficiency or stress fracture. Tarlov cysts are noted.     DISTAL CORD AND CONUS:  Normal size and signal within the distal cord and conus.     PARASPINAL SOFT TISSUES:  Paraspinal soft tissues are unremarkable.     LOWER THORACIC DISC SPACES: Stable focal ossification of the posterior longitudinal ligament at T10-11 causing mild canal stenosis. Small disc bulges without significant canal stenosis at T11-12 and " T12-L1.     LUMBAR DISC SPACES:     L1-L2: Small disc bulge. No significant canal or foraminal stenosis.     L2-L3: Small disc bulge and marginal osteophyte with mild facet arthropathy. Mild canal stenosis. Mild left foraminal stenosis.     L3-L4: Disc osteophyte complex and facet arthropathy. Mild canal stenosis. Subarticular stenosis. Moderate right and mild left foraminal stenosis.     L4-L5: Disc bulge and marginal osteophyte asymmetric to the right. Facet arthropathy. No significant central canal stenosis. Right subarticular/lateral recess stenosis, correlate for right L5 radiculopathy. Moderate right and mild left foraminal   stenosis.     L5-S1: Disc bulge and marginal osteophyte asymmetric to the left. Facet arthropathy. No significant canal stenosis. Moderate foraminal stenosis.     OTHER FINDINGS: Stable 1 cm left renal cyst and stable 1 cm left adrenal nodule.     IMPRESSION:     Multilevel degenerative spondylosis as described.        Workstation performed: [id number]         No orders of the defined types were placed in this encounter.

## 2025-05-14 ENCOUNTER — HOSPITAL ENCOUNTER (OUTPATIENT)
Dept: BONE DENSITY | Facility: HOSPITAL | Age: 65
Discharge: HOME | End: 2025-05-14
Payer: COMMERCIAL

## 2025-05-14 ENCOUNTER — HOSPITAL ENCOUNTER (OUTPATIENT)
Dept: MAMMOGRAPHY | Facility: HOSPITAL | Age: 65
Discharge: HOME | End: 2025-05-14
Payer: COMMERCIAL

## 2025-05-14 VITALS — BODY MASS INDEX: 20.88 KG/M2 | WEIGHT: 141 LBS | HEIGHT: 69 IN

## 2025-05-14 DIAGNOSIS — Z78.0 POSTMENOPAUSAL: ICD-10-CM

## 2025-05-14 DIAGNOSIS — Z12.31 VISIT FOR SCREENING MAMMOGRAM: ICD-10-CM

## 2025-05-14 PROCEDURE — 77080 DXA BONE DENSITY AXIAL: CPT

## 2025-05-14 PROCEDURE — 77067 SCR MAMMO BI INCL CAD: CPT

## 2025-05-14 PROCEDURE — 77063 BREAST TOMOSYNTHESIS BI: CPT

## 2025-05-21 ENCOUNTER — RESULTS FOLLOW-UP (OUTPATIENT)
Dept: INTERNAL MEDICINE CLINIC | Facility: CLINIC | Age: 65
End: 2025-05-21

## (undated) DEVICE — INTENDED FOR TISSUE SEPARATION, AND OTHER PROCEDURES THAT REQUIRE A SHARP SURGICAL BLADE TO PUNCTURE OR CUT.: Brand: BARD-PARKER SAFETY BLADES SIZE 15, STERILE

## (undated) DEVICE — SUT MONOCRYL 4-0 PS-2 27 IN Y426H

## (undated) DEVICE — 3M™ STERI-STRIP™ REINFORCED ADHESIVE SKIN CLOSURES, R1546, 1/4 IN X 4 IN (6 MM X 100 MM), 10 STRIPS/ENVELOPE: Brand: 3M™ STERI-STRIP™

## (undated) DEVICE — CHLORAPREP HI-LITE 26ML ORANGE

## (undated) DEVICE — BASIC SINGLE BASIN 2-LF: Brand: MEDLINE INDUSTRIES, INC.

## (undated) DEVICE — IRRIG ENDO FLO TUBING

## (undated) DEVICE — CLOSURE DEVICE ENDO CLOSE

## (undated) DEVICE — GRASPER ATRAUMATIC FEN 5MM X 33CM ROTATING THUMB LOOP GENI-SURGE

## (undated) DEVICE — LIGHT GLOVE GREEN

## (undated) DEVICE — DRAPE EQUIPMENT RF WAND

## (undated) DEVICE — PAD GROUNDING ADULT

## (undated) DEVICE — 5 MM CURVED DISSECTORS WITH MONOPOLAR CAUTERY: Brand: ENDOPATH

## (undated) DEVICE — TISSUE RETRIEVAL SYSTEM: Brand: INZII RETRIEVAL SYSTEM

## (undated) DEVICE — ENSEAL LAPAROSCOPIC TISSUE SEALER G2 CURVED JAW FOR USE WITH G2 GENERATOR 5MM DIAMETER 35CM SHAFT LENGTH: Brand: ENSEAL

## (undated) DEVICE — SKIN MARKER DUAL TIP WITH RULER CAP, FLEXIBLE RULER AND LABELS: Brand: DEVON

## (undated) DEVICE — ENDOPATH PNEUMONEEDLE INSUFFLATION NEEDLES WITH LUER LOCK CONNECTORS 150MM: Brand: ENDOPATH

## (undated) DEVICE — SUT VICRYL 0 UR-6 27 IN J603H

## (undated) DEVICE — GLOVE SRG BIOGEL 7

## (undated) DEVICE — GAUZE SPONGES,8 PLY: Brand: CURITY

## (undated) DEVICE — TROCAR: Brand: KII SLEEVE

## (undated) DEVICE — 1820 FOAM BLOCK NEEDLE COUNTER: Brand: DEVON

## (undated) DEVICE — TROCARS: Brand: KII® BALLOON BLUNT TIP SYSTEM

## (undated) DEVICE — GLOVE INDICATOR PI UNDERGLOVE SZ 7 BLUE

## (undated) DEVICE — GENERAL ENDOSCOPY PACK: Brand: CONVERTORS

## (undated) DEVICE — ADHESIVE SKIN HIGH VISCOSITY EXOFIN 1ML

## (undated) DEVICE — INSUFFLATION TUBING PRIMFLO

## (undated) DEVICE — STRL COTTON TIP APPLCTR 6IN PK: Brand: CARDINAL HEALTH

## (undated) DEVICE — TUBING SMOKE EVAC W/FILTRATION DEVICE PLUMEPORT ACTIV

## (undated) DEVICE — NEEDLE 25G X 1 1/2

## (undated) DEVICE — 3M™ TEGADERM™ TRANSPARENT FILM DRESSING FRAME STYLE, 1624W, 2-3/8 IN X 2-3/4 IN (6 CM X 7 CM), 100/CT 4CT/CASE: Brand: 3M™ TEGADERM™

## (undated) DEVICE — ANTI-FOG SOLUTION WITH FOAM PAD: Brand: DEVON

## (undated) DEVICE — TROCAR: Brand: KII FIOS FIRST ENTRY

## (undated) DEVICE — PLUMEPEN PRO 10FT

## (undated) DEVICE — SPONGE LAP 18 X 18 IN STRL RFD